# Patient Record
Sex: MALE | Race: WHITE | NOT HISPANIC OR LATINO | Employment: OTHER | URBAN - METROPOLITAN AREA
[De-identification: names, ages, dates, MRNs, and addresses within clinical notes are randomized per-mention and may not be internally consistent; named-entity substitution may affect disease eponyms.]

---

## 2018-06-19 ENCOUNTER — OFFICE VISIT (OUTPATIENT)
Dept: INTERNAL MEDICINE CLINIC | Facility: CLINIC | Age: 68
End: 2018-06-19
Payer: MEDICARE

## 2018-06-19 VITALS
DIASTOLIC BLOOD PRESSURE: 74 MMHG | HEART RATE: 108 BPM | TEMPERATURE: 99.6 F | RESPIRATION RATE: 18 BRPM | HEIGHT: 68 IN | WEIGHT: 267.2 LBS | BODY MASS INDEX: 40.5 KG/M2 | OXYGEN SATURATION: 96 % | SYSTOLIC BLOOD PRESSURE: 116 MMHG

## 2018-06-19 DIAGNOSIS — Z23 NEED FOR PNEUMOCOCCAL VACCINATION: ICD-10-CM

## 2018-06-19 DIAGNOSIS — I10 HYPERTENSION, UNSPECIFIED TYPE: ICD-10-CM

## 2018-06-19 DIAGNOSIS — R06.00 DOE (DYSPNEA ON EXERTION): ICD-10-CM

## 2018-06-19 DIAGNOSIS — Z12.5 SCREENING FOR PROSTATE CANCER: ICD-10-CM

## 2018-06-19 DIAGNOSIS — Z13.29 SCREENING FOR HYPOTHYROIDISM: ICD-10-CM

## 2018-06-19 DIAGNOSIS — E55.9 VITAMIN D DEFICIENCY: ICD-10-CM

## 2018-06-19 DIAGNOSIS — E78.5 HYPERLIPIDEMIA, UNSPECIFIED HYPERLIPIDEMIA TYPE: ICD-10-CM

## 2018-06-19 DIAGNOSIS — F51.01 PRIMARY INSOMNIA: Primary | ICD-10-CM

## 2018-06-19 PROCEDURE — G0009 ADMIN PNEUMOCOCCAL VACCINE: HCPCS

## 2018-06-19 PROCEDURE — 90670 PCV13 VACCINE IM: CPT

## 2018-06-19 PROCEDURE — 99204 OFFICE O/P NEW MOD 45 MIN: CPT | Performed by: PHYSICIAN ASSISTANT

## 2018-06-19 RX ORDER — ZOLPIDEM TARTRATE 10 MG/1
10 TABLET ORAL
Qty: 30 TABLET | Refills: 2 | Status: SHIPPED | OUTPATIENT
Start: 2018-06-19 | End: 2020-01-30

## 2018-06-19 RX ORDER — LISINOPRIL AND HYDROCHLOROTHIAZIDE 25; 20 MG/1; MG/1
TABLET ORAL
COMMUNITY
Start: 2018-04-05 | End: 2018-06-19 | Stop reason: SDUPTHER

## 2018-06-19 RX ORDER — PRAVASTATIN SODIUM 40 MG
TABLET ORAL
COMMUNITY
Start: 2018-04-26 | End: 2018-06-19 | Stop reason: SDUPTHER

## 2018-06-19 RX ORDER — DIPHENHYDRAMINE HCL 25 MG
25 TABLET ORAL
COMMUNITY
End: 2022-01-18

## 2018-06-19 RX ORDER — ZOLPIDEM TARTRATE 5 MG/1
5 TABLET ORAL
COMMUNITY
End: 2018-06-19 | Stop reason: SDUPTHER

## 2018-06-19 RX ORDER — LISINOPRIL AND HYDROCHLOROTHIAZIDE 25; 20 MG/1; MG/1
1 TABLET ORAL DAILY
Qty: 90 TABLET | Refills: 3 | Status: SHIPPED | OUTPATIENT
Start: 2018-06-19 | End: 2019-04-18 | Stop reason: SDUPTHER

## 2018-06-19 RX ORDER — PRAVASTATIN SODIUM 40 MG
40 TABLET ORAL DAILY
Qty: 90 TABLET | Refills: 3 | Status: SHIPPED | OUTPATIENT
Start: 2018-06-19 | End: 2018-07-31 | Stop reason: SDUPTHER

## 2018-06-19 NOTE — PROGRESS NOTES
Assessment/Plan:    Primary insomnia  Will increase ambien to 10mg       Diagnoses and all orders for this visit:    Primary insomnia  -     zolpidem (AMBIEN) 10 mg tablet; Take 1 tablet (10 mg total) by mouth daily at bedtime as needed for sleep    Screening for prostate cancer  -     PSA, total and free    Vitamin D deficiency  -     Vitamin D 25 hydroxy    Screening for hypothyroidism  -     TSH, 3rd generation with T4 reflex    Hypertension, unspecified type  -     CBC and differential  -     Comprehensive metabolic panel  -     Lipid Panel with Direct LDL reflex  -     lisinopril-hydrochlorothiazide (PRINZIDE,ZESTORETIC) 20-25 MG per tablet; Take 1 tablet by mouth daily  -     Echo stress test w contrast if indicated; Future    Hyperlipidemia, unspecified hyperlipidemia type  -     CBC and differential  -     Comprehensive metabolic panel  -     Lipid Panel with Direct LDL reflex  -     pravastatin (PRAVACHOL) 40 mg tablet; Take 1 tablet (40 mg total) by mouth daily  -     Echo stress test w contrast if indicated; Future    HAMPTON (dyspnea on exertion)  -     Echo stress test w contrast if indicated; Future    Need for pneumococcal vaccination  -     PNEUMOCOCCAL CONJUGATE VACCINE 13-VALENT GREATER THAN 6 MONTHS    Other orders  -     Discontinue: pravastatin (PRAVACHOL) 40 mg tablet;   -     aspirin 81 MG tablet; Take 81 mg by mouth daily  -     Omega-3 Fatty Acids (FISH OIL PO); Take by mouth  -     Discontinue: zolpidem (AMBIEN) 5 mg tablet; Take 5 mg by mouth daily at bedtime as needed for sleep  -     diphenhydrAMINE (BENADRYL) 25 mg tablet; Take 25 mg by mouth daily at bedtime as needed for itching  -     Discontinue: lisinopril-hydrochlorothiazide (PRINZIDE,ZESTORETIC) 20-25 MG per tablet;           Subjective:      Patient ID: Sal Dolan is a 69 y o  male  Pt presents to [facility] care  PMHx significant for hyperlipidemia, insomnia, htn, and arthritis  He has had multiple orthopedic surgeries  NKDA  Medications are as noted in his chart  He is a former smoker and quit 40 years ago  Alcohol use is social  He is retired,  and has 4 children  Both parents are , his mother had lupus and RA and his father had heart disease  He is due for labs  Colonoscopy up to date  He complains of ongoing insomnia  The Lory Adams used to work better for him but now he gets about 2 hours worth of sleep  He tolerates this well  He takes it only prn  The following portions of the patient's history were reviewed and updated as appropriate:   He  has a past medical history of Arthritis; Hyperlipidemia; and Hypertension  He   Patient Active Problem List    Diagnosis Date Noted    Primary insomnia 2018    Hyperlipidemia 2018    Hypertension 2018     He  has a past surgical history that includes Knee surgery; Hand surgery (Bilateral); Wrist surgery; Elbow surgery; and Colonoscopy  His family history includes Diabetes in his sister; Heart disease in his father and paternal grandmother; Kidney disease in his sister; Lupus in his mother; Rheum arthritis in his mother  He  reports that he has quit smoking  He has never used smokeless tobacco  He reports that he drinks alcohol  He reports that he does not use drugs  Current Outpatient Prescriptions   Medication Sig Dispense Refill    aspirin 81 MG tablet Take 81 mg by mouth daily      diphenhydrAMINE (BENADRYL) 25 mg tablet Take 25 mg by mouth daily at bedtime as needed for itching      lisinopril-hydrochlorothiazide (PRINZIDE,ZESTORETIC) 20-25 MG per tablet Take 1 tablet by mouth daily 90 tablet 3    Omega-3 Fatty Acids (FISH OIL PO) Take by mouth      pravastatin (PRAVACHOL) 40 mg tablet Take 1 tablet (40 mg total) by mouth daily 90 tablet 3    zolpidem (AMBIEN) 10 mg tablet Take 1 tablet (10 mg total) by mouth daily at bedtime as needed for sleep 30 tablet 2     No current facility-administered medications for this visit        No current outpatient prescriptions on file prior to visit  No current facility-administered medications on file prior to visit  He is allergic to other       Review of Systems   Constitutional: Negative for chills and fever  HENT: Negative for congestion, ear pain, hearing loss, postnasal drip, rhinorrhea, sinus pain, sinus pressure, sore throat and trouble swallowing  Eyes: Negative for pain and visual disturbance  Respiratory: Negative for cough, chest tightness, shortness of breath and wheezing  Cardiovascular: Negative  Negative for chest pain, palpitations and leg swelling  Gastrointestinal: Negative for abdominal pain, blood in stool, constipation, diarrhea, nausea and vomiting  Endocrine: Negative for cold intolerance, heat intolerance, polydipsia, polyphagia and polyuria  Genitourinary: Negative for difficulty urinating, dysuria, flank pain and urgency  Musculoskeletal: Negative for arthralgias, back pain, gait problem and myalgias  Skin: Negative for rash  Allergic/Immunologic: Negative  Neurological: Negative for dizziness, weakness, light-headedness and headaches  Hematological: Negative  Psychiatric/Behavioral: Positive for sleep disturbance  Negative for behavioral problems and dysphoric mood  The patient is not nervous/anxious  Objective:      /74 (BP Location: Left arm, Patient Position: Sitting, Cuff Size: Large)   Pulse (!) 108   Temp 99 6 °F (37 6 °C)   Resp 18   Ht 5' 8" (1 727 m)   Wt 121 kg (267 lb 3 2 oz)   SpO2 96%   BMI 40 63 kg/m²          Physical Exam   Constitutional: He is oriented to person, place, and time  He appears well-developed and well-nourished  No distress  HENT:   Head: Normocephalic and atraumatic  Right Ear: External ear normal    Left Ear: External ear normal    Nose: Nose normal    Mouth/Throat: Oropharynx is clear and moist  No oropharyngeal exudate     Eyes: Conjunctivae and EOM are normal  Pupils are equal, round, and reactive to light  Right eye exhibits no discharge  Left eye exhibits no discharge  No scleral icterus  Neck: Normal range of motion  Neck supple  No thyromegaly present  Cardiovascular: Normal rate, regular rhythm and normal heart sounds  Exam reveals no gallop and no friction rub  No murmur heard  Pulmonary/Chest: Effort normal and breath sounds normal  No respiratory distress  He has no wheezes  He has no rales  Abdominal: Soft  Bowel sounds are normal  He exhibits no distension  There is no tenderness  Musculoskeletal: Normal range of motion  He exhibits no edema, tenderness or deformity  Neurological: He is alert and oriented to person, place, and time  No cranial nerve deficit  Skin: Skin is warm and dry  He is not diaphoretic  Psychiatric: He has a normal mood and affect   His behavior is normal  Judgment and thought content normal

## 2018-06-25 ENCOUNTER — APPOINTMENT (OUTPATIENT)
Dept: LAB | Facility: CLINIC | Age: 68
End: 2018-06-25
Payer: MEDICARE

## 2018-06-25 LAB
25(OH)D3 SERPL-MCNC: 14.8 NG/ML (ref 30–100)
ALBUMIN SERPL BCP-MCNC: 3.6 G/DL (ref 3.5–5)
ALP SERPL-CCNC: 50 U/L (ref 46–116)
ALT SERPL W P-5'-P-CCNC: 45 U/L (ref 12–78)
ANION GAP SERPL CALCULATED.3IONS-SCNC: 6 MMOL/L (ref 4–13)
AST SERPL W P-5'-P-CCNC: 24 U/L (ref 5–45)
BASOPHILS # BLD AUTO: 0.05 THOUSANDS/ΜL (ref 0–0.1)
BASOPHILS NFR BLD AUTO: 1 % (ref 0–1)
BILIRUB SERPL-MCNC: 0.74 MG/DL (ref 0.2–1)
BUN SERPL-MCNC: 16 MG/DL (ref 5–25)
CALCIUM SERPL-MCNC: 8.7 MG/DL (ref 8.3–10.1)
CHLORIDE SERPL-SCNC: 101 MMOL/L (ref 100–108)
CHOLEST SERPL-MCNC: 133 MG/DL (ref 50–200)
CO2 SERPL-SCNC: 28 MMOL/L (ref 21–32)
CREAT SERPL-MCNC: 1.03 MG/DL (ref 0.6–1.3)
EOSINOPHIL # BLD AUTO: 0.37 THOUSAND/ΜL (ref 0–0.61)
EOSINOPHIL NFR BLD AUTO: 4 % (ref 0–6)
ERYTHROCYTE [DISTWIDTH] IN BLOOD BY AUTOMATED COUNT: 13.3 % (ref 11.6–15.1)
GFR SERPL CREATININE-BSD FRML MDRD: 74 ML/MIN/1.73SQ M
GLUCOSE P FAST SERPL-MCNC: 117 MG/DL (ref 65–99)
HCT VFR BLD AUTO: 45.8 % (ref 36.5–49.3)
HDLC SERPL-MCNC: 45 MG/DL (ref 40–60)
HGB BLD-MCNC: 14.8 G/DL (ref 12–17)
IMM GRANULOCYTES # BLD AUTO: 0.05 THOUSAND/UL (ref 0–0.2)
IMM GRANULOCYTES NFR BLD AUTO: 1 % (ref 0–2)
LDLC SERPL CALC-MCNC: 67 MG/DL (ref 0–100)
LYMPHOCYTES # BLD AUTO: 2.19 THOUSANDS/ΜL (ref 0.6–4.47)
LYMPHOCYTES NFR BLD AUTO: 26 % (ref 14–44)
MCH RBC QN AUTO: 30.4 PG (ref 26.8–34.3)
MCHC RBC AUTO-ENTMCNC: 32.3 G/DL (ref 31.4–37.4)
MCV RBC AUTO: 94 FL (ref 82–98)
MONOCYTES # BLD AUTO: 0.72 THOUSAND/ΜL (ref 0.17–1.22)
MONOCYTES NFR BLD AUTO: 9 % (ref 4–12)
NEUTROPHILS # BLD AUTO: 5.05 THOUSANDS/ΜL (ref 1.85–7.62)
NEUTS SEG NFR BLD AUTO: 59 % (ref 43–75)
NRBC BLD AUTO-RTO: 0 /100 WBCS
PLATELET # BLD AUTO: 219 THOUSANDS/UL (ref 149–390)
PMV BLD AUTO: 12.3 FL (ref 8.9–12.7)
POTASSIUM SERPL-SCNC: 4 MMOL/L (ref 3.5–5.3)
PROT SERPL-MCNC: 7.4 G/DL (ref 6.4–8.2)
RBC # BLD AUTO: 4.87 MILLION/UL (ref 3.88–5.62)
SODIUM SERPL-SCNC: 135 MMOL/L (ref 136–145)
TRIGL SERPL-MCNC: 105 MG/DL
TSH SERPL DL<=0.05 MIU/L-ACNC: 0.71 UIU/ML (ref 0.36–3.74)
WBC # BLD AUTO: 8.43 THOUSAND/UL (ref 4.31–10.16)

## 2018-06-25 PROCEDURE — G0103 PSA SCREENING: HCPCS

## 2018-06-25 PROCEDURE — 36415 COLL VENOUS BLD VENIPUNCTURE: CPT | Performed by: PHYSICIAN ASSISTANT

## 2018-06-25 PROCEDURE — 85025 COMPLETE CBC W/AUTO DIFF WBC: CPT | Performed by: PHYSICIAN ASSISTANT

## 2018-06-25 PROCEDURE — 84443 ASSAY THYROID STIM HORMONE: CPT | Performed by: PHYSICIAN ASSISTANT

## 2018-06-25 PROCEDURE — 82306 VITAMIN D 25 HYDROXY: CPT | Performed by: PHYSICIAN ASSISTANT

## 2018-06-25 PROCEDURE — 80061 LIPID PANEL: CPT | Performed by: PHYSICIAN ASSISTANT

## 2018-06-25 PROCEDURE — 80053 COMPREHEN METABOLIC PANEL: CPT | Performed by: PHYSICIAN ASSISTANT

## 2018-06-26 DIAGNOSIS — E55.9 VITAMIN D DEFICIENCY: Primary | ICD-10-CM

## 2018-06-26 RX ORDER — ERGOCALCIFEROL 1.25 MG/1
50000 CAPSULE ORAL WEEKLY
Qty: 4 CAPSULE | Refills: 0 | Status: SHIPPED | OUTPATIENT
Start: 2018-06-26 | End: 2018-12-18

## 2018-06-27 ENCOUNTER — APPOINTMENT (OUTPATIENT)
Dept: LAB | Facility: CLINIC | Age: 68
End: 2018-06-27
Payer: MEDICARE

## 2018-06-27 DIAGNOSIS — R73.01 ELEVATED FASTING BLOOD SUGAR: Primary | ICD-10-CM

## 2018-06-27 LAB
EST. AVERAGE GLUCOSE BLD GHB EST-MCNC: 143 MG/DL
HBA1C MFR BLD: 6.6 % (ref 4.2–6.3)
PSA FREE MFR SERPL: 27.5 %
PSA FREE SERPL-MCNC: 0.11 NG/ML
PSA SERPL-MCNC: 0.4 NG/ML (ref 0–4)

## 2018-06-27 PROCEDURE — 83036 HEMOGLOBIN GLYCOSYLATED A1C: CPT

## 2018-06-27 PROCEDURE — 36415 COLL VENOUS BLD VENIPUNCTURE: CPT

## 2018-07-31 DIAGNOSIS — E78.5 HYPERLIPIDEMIA, UNSPECIFIED HYPERLIPIDEMIA TYPE: ICD-10-CM

## 2018-07-31 RX ORDER — PRAVASTATIN SODIUM 40 MG
40 TABLET ORAL DAILY
Qty: 90 TABLET | Refills: 3 | Status: SHIPPED | OUTPATIENT
Start: 2018-07-31 | End: 2019-08-19 | Stop reason: SDUPTHER

## 2018-08-10 ENCOUNTER — HOSPITAL ENCOUNTER (OUTPATIENT)
Dept: NON INVASIVE DIAGNOSTICS | Facility: CLINIC | Age: 68
Discharge: HOME | End: 2018-08-10
Payer: MEDICARE

## 2018-08-10 DIAGNOSIS — R94.39 ABNORMAL CARDIOVASCULAR STRESS TEST: ICD-10-CM

## 2018-08-10 DIAGNOSIS — E78.5 HYPERLIPIDEMIA, UNSPECIFIED HYPERLIPIDEMIA TYPE: ICD-10-CM

## 2018-08-10 DIAGNOSIS — I10 HYPERTENSION, UNSPECIFIED TYPE: ICD-10-CM

## 2018-08-10 DIAGNOSIS — I10 ESSENTIAL HYPERTENSION: ICD-10-CM

## 2018-08-10 DIAGNOSIS — I10 BENIGN HYPERTENSION: Primary | ICD-10-CM

## 2018-08-10 DIAGNOSIS — R94.39 ABNORMAL STRESS ECHO: Primary | ICD-10-CM

## 2018-08-10 DIAGNOSIS — R06.00 DOE (DYSPNEA ON EXERTION): ICD-10-CM

## 2018-08-10 PROCEDURE — 93351 STRESS TTE COMPLETE: CPT | Performed by: INTERNAL MEDICINE

## 2018-08-10 PROCEDURE — 93350 STRESS TTE ONLY: CPT

## 2018-08-10 NOTE — PROGRESS NOTES
Patient scheduled for heart catheterization with Chasity Foster due to abnormal stress test    Labs ordered

## 2018-08-13 ENCOUNTER — APPOINTMENT (OUTPATIENT)
Dept: LAB | Facility: CLINIC | Age: 68
End: 2018-08-13
Payer: MEDICARE

## 2018-08-13 LAB
ANION GAP SERPL CALCULATED.3IONS-SCNC: 8 MMOL/L (ref 4–13)
BASOPHILS # BLD AUTO: 0.04 THOUSANDS/ΜL (ref 0–0.1)
BASOPHILS NFR BLD AUTO: 1 % (ref 0–1)
BUN SERPL-MCNC: 14 MG/DL (ref 5–25)
CALCIUM SERPL-MCNC: 8.5 MG/DL (ref 8.3–10.1)
CHLORIDE SERPL-SCNC: 104 MMOL/L (ref 100–108)
CO2 SERPL-SCNC: 27 MMOL/L (ref 21–32)
CREAT SERPL-MCNC: 1.07 MG/DL (ref 0.6–1.3)
EOSINOPHIL # BLD AUTO: 0.23 THOUSAND/ΜL (ref 0–0.61)
EOSINOPHIL NFR BLD AUTO: 3 % (ref 0–6)
ERYTHROCYTE [DISTWIDTH] IN BLOOD BY AUTOMATED COUNT: 13.5 % (ref 11.6–15.1)
GFR SERPL CREATININE-BSD FRML MDRD: 70 ML/MIN/1.73SQ M
GLUCOSE SERPL-MCNC: 98 MG/DL (ref 65–140)
HCT VFR BLD AUTO: 44.1 % (ref 36.5–49.3)
HGB BLD-MCNC: 14.3 G/DL (ref 12–17)
IMM GRANULOCYTES # BLD AUTO: 0.04 THOUSAND/UL (ref 0–0.2)
IMM GRANULOCYTES NFR BLD AUTO: 1 % (ref 0–2)
LYMPHOCYTES # BLD AUTO: 2.91 THOUSANDS/ΜL (ref 0.6–4.47)
LYMPHOCYTES NFR BLD AUTO: 34 % (ref 14–44)
MCH RBC QN AUTO: 30.6 PG (ref 26.8–34.3)
MCHC RBC AUTO-ENTMCNC: 32.4 G/DL (ref 31.4–37.4)
MCV RBC AUTO: 94 FL (ref 82–98)
MONOCYTES # BLD AUTO: 0.97 THOUSAND/ΜL (ref 0.17–1.22)
MONOCYTES NFR BLD AUTO: 11 % (ref 4–12)
NEUTROPHILS # BLD AUTO: 4.49 THOUSANDS/ΜL (ref 1.85–7.62)
NEUTS SEG NFR BLD AUTO: 50 % (ref 43–75)
NRBC BLD AUTO-RTO: 0 /100 WBCS
PLATELET # BLD AUTO: 224 THOUSANDS/UL (ref 149–390)
PMV BLD AUTO: 13.1 FL (ref 8.9–12.7)
POTASSIUM SERPL-SCNC: 4.1 MMOL/L (ref 3.5–5.3)
RBC # BLD AUTO: 4.67 MILLION/UL (ref 3.88–5.62)
SODIUM SERPL-SCNC: 139 MMOL/L (ref 136–145)
WBC # BLD AUTO: 8.68 THOUSAND/UL (ref 4.31–10.16)

## 2018-08-13 PROCEDURE — 85025 COMPLETE CBC W/AUTO DIFF WBC: CPT

## 2018-08-13 PROCEDURE — 36415 COLL VENOUS BLD VENIPUNCTURE: CPT

## 2018-08-13 PROCEDURE — 80048 BASIC METABOLIC PNL TOTAL CA: CPT

## 2018-08-14 ENCOUNTER — TELEPHONE (OUTPATIENT)
Dept: INTERNAL MEDICINE CLINIC | Facility: CLINIC | Age: 68
End: 2018-08-14

## 2018-08-14 NOTE — TELEPHONE ENCOUNTER
I cannot addend the note  Those results were not available at the time of the encounter anyway  Can we write a letter stating it was positive? Or can his cardiologist provide the info needed?

## 2018-08-14 NOTE — TELEPHONE ENCOUNTER
Talked to BODØ again   She will talk to her PA tomorrow and see if they can see him ASAP to get H&P

## 2018-08-14 NOTE — TELEPHONE ENCOUNTER
Called Esperanza Donahue from cardio again   She states the cardiac cath dept needs addendum stating the stress test is positive and cardiac cath is needed for further eval

## 2018-08-14 NOTE — TELEPHONE ENCOUNTER
Tonja Ya, thank you  Not trying to be difficult  I attempted to addend the note and it would not allow me to since it is from [facility]

## 2018-08-15 ENCOUNTER — OFFICE VISIT (OUTPATIENT)
Dept: CARDIOLOGY CLINIC | Facility: CLINIC | Age: 68
End: 2018-08-15
Payer: MEDICARE

## 2018-08-15 VITALS
BODY MASS INDEX: 35 KG/M2 | WEIGHT: 250 LBS | HEART RATE: 90 BPM | HEIGHT: 71 IN | SYSTOLIC BLOOD PRESSURE: 110 MMHG | DIASTOLIC BLOOD PRESSURE: 82 MMHG

## 2018-08-15 DIAGNOSIS — E78.2 MIXED HYPERLIPIDEMIA: ICD-10-CM

## 2018-08-15 DIAGNOSIS — R06.00 DYSPNEA ON EXERTION: Primary | ICD-10-CM

## 2018-08-15 DIAGNOSIS — R94.39 ABNORMAL STRESS ECHO: ICD-10-CM

## 2018-08-15 DIAGNOSIS — I10 ESSENTIAL HYPERTENSION: ICD-10-CM

## 2018-08-15 PROBLEM — R06.09 DYSPNEA ON EXERTION: Status: ACTIVE | Noted: 2018-08-15

## 2018-08-15 PROCEDURE — 99205 OFFICE O/P NEW HI 60 MIN: CPT | Performed by: PHYSICIAN ASSISTANT

## 2018-08-15 NOTE — PROGRESS NOTES
[facility]   Outpatient Note  Sal Dolan  [id number]  [id number]  [facility], [facility] [address] [id number] [phone]    Subjective:   Sal Dolan is a 69 y o  male    The patient is referred to the office as a new patient for evaluation and treatment with regard to SOB  He recently had an abnormal stress echo that showed WMA that was indicative of ischemia in the LAD distribution  The EKG portion of the Stress test was non-diagnostic, although there was non-specific ST changes  The patient had been feeling SOB with exertion walking up a hill or pulling weeds, for the last two years more so in the last few months  He has no chest pain or pressure per se, and felt that he should be checked out with a stress test given his family history and risk factors  His PCP ordered a stress test upon his request     He continues to have exertional dyspnea  He has a history of HTN, HLD, and obesity  He has not smoked for over 40 years  All of his brothers and father have had Coronary events and stenting             PMH/PSH  Reviewd  History   Smoking Status    Former Smoker   Smokeless Tobacco    Never Used   ,   History   Alcohol Use    Yes     Comment: SOCIAL   ,   History   Drug Use No     Family History   Problem Relation Age of Onset    Rheum arthritis Mother     Lupus Mother     Heart disease Father     Diabetes Sister     Kidney disease Sister     Heart disease Paternal Grandmother          Current Outpatient Prescriptions:     aspirin 81 MG tablet, Take 81 mg by mouth daily, Disp: , Rfl:     diphenhydrAMINE (BENADRYL) 25 mg tablet, Take 25 mg by mouth daily at bedtime as needed for itching, Disp: , Rfl:     ergocalciferol (VITAMIN D2) 50,000 units, Take 1 capsule (50,000 Units total) by mouth once a week, Disp: 4 capsule, Rfl: 0    lisinopril-hydrochlorothiazide (PRINZIDE,ZESTORETIC) 20-25 MG per tablet, Take 1 tablet by mouth daily, Disp: 90 tablet, Rfl: 3    Omega-3 Fatty Acids (FISH OIL PO), Take by mouth, Disp: , Rfl:     pravastatin (PRAVACHOL) 40 mg tablet, Take 1 tablet (40 mg total) by mouth daily, Disp: 90 tablet, Rfl: 3    zolpidem (AMBIEN) 10 mg tablet, Take 1 tablet (10 mg total) by mouth daily at bedtime as needed for sleep, Disp: 30 tablet, Rfl: 2  Allergies   Allergen Reactions    Other      Summertime grass, weeds       ROS    Objective:   /82   Pulse 90   Ht 5' 11" (1 803 m)   Wt 113 kg (250 lb)   BMI 34 87 kg/m²   Physical Exam    Lab Review:   Unremarkable      Recent Cardiac Testing:   See comments above     ECG Review:   SR RBBB NSST changes    Assessment:     Problem List Items Addressed This Visit     Hyperlipidemia    Hypertension    Relevant Medications    metoprolol tartrate (LOPRESSOR) 25 mg tablet    Dyspnea on exertion - Primary    Relevant Medications    metoprolol tartrate (LOPRESSOR) 25 mg tablet    Abnormal stress echo    Relevant Medications    metoprolol tartrate (LOPRESSOR) 25 mg tablet        Plan:   The patient will be planned for Cardiac Cath ( VIS possible)  He is given instructions and is agreeable  Arrangements will be made for first available time [facility]  Metoprolol will be added  Return in one month

## 2018-08-15 NOTE — PATIENT INSTRUCTIONS
Heart Catheterization   AMBULATORY CARE:   What you need to know about heart catheterization:  A heart catheterization is a procedure to look at your heart and its blood vessels  Healthcare providers can measure oxygen levels and pressures in your heart  They can also fix problems with your valves, blood vessels, or the walls of your heart during the procedure  You may need this procedure if you have chest pain, heart disease, or your heart is not working properly  How to prepare for heart catheterization:   · You may need blood tests, a chest x-ray, ultrasound, or electrocardiogram (ECG) before your surgery  Talk to your healthcare provider about these or other tests you may need  You will need someone to drive you home and stay with you after your procedure  · Your healthcare provider will talk to you about how to prepare for your procedure  He may tell you not to eat or drink anything after midnight on the day of your procedure  He will tell you what medicines to take or not take on the day of your procedure  You may need to stop taking blood thinners several days before your procedure  You can continue taking aspirin  You may be given an antibiotic through your IV to help prevent a bacterial infection  · Contrast liquid may be used during your procedure  Tell a healthcare provider if you have an allergy to any contrast materials or other medicines  What will happen during heart catheterization:   · You may be given general anesthesia to keep you asleep and free from pain during your procedure  You may instead be given IV sedation to make you feel calm and relaxed during the procedure  You may also be given local anesthesia to numb the surgery area  With local anesthesia, you may still feel pressure or pushing during your procedure, but you should not feel any pain  · If you are given IV sedation or local anesthesia, you will be awake enough to follow directions   You may be asked to cough, deep breathe, or hold your breath  This will help your healthcare provider see your heart structures more clearly on x-ray  · Your healthcare provider will insert a catheter and wire into a blood vessel in your neck, arm, wrist, or groin  He will move a wire through the catheter and up into your heart  Your healthcare provider may inject contrast liquid so he can see your heart tissue, blood vessels, or valves more clearly on the x-ray  He may measure oxygen and pressures in different parts of your heart and blood vessels  He may also fix blockages in your blood vessels or open up valves in your heart  He may repair or replace one of your heart valves  A small piece of your heart tissue may be taken to stop irregular heartbeats  · Your healthcare provider will remove the catheter  He may use clamps, stitches, or other devices to close the wound  Pressure will be applied to the wound for several minutes to stop any bleeding  A pressure bandage or other pressure device may be placed over the wound to help prevent more bleeding  What will happen after heart catheterization:   · You will be attached to a heart monitor until you are fully awake  A heart monitor is an EKG that stays on continuously to record your heart's electrical activity  Healthcare providers will monitor your vital signs and pulses in your arm or leg  They will frequently check your pressure bandage for bleeding or swelling  · You will need to lie flat with your leg or arm straight for 2 to 4 hours  Do not  get out of bed until your healthcare provider says it is okay  Arm or leg movements can cause serious bleeding  After you are monitored for several hours, you may go home or may need to stay in the hospital overnight  Risks of heart catheterization:  You may bleed more than usual or get an infection  You may have bruising or pain where the catheter was   You may need surgery to repair damages from the catheter to your heart or blood vessels or stop bleeding  You may get a blood clot in your arm or leg  You could have a heart attack or stroke during or after the procedure  Your heart could have irregular heart beats  The contrast liquid may cause kidney damage or an allergic reaction  Call 911 for any of the following:   · You have any of the following signs of a heart attack:      ¨ Squeezing, pressure, or pain in your chest that lasts longer than 5 minutes or returns    ¨ Discomfort or pain in your back, neck, jaw, stomach, or arm     ¨ Trouble breathing    ¨ Nausea or vomiting    ¨ Lightheadedness or a sudden cold sweat, especially with chest pain or trouble breathing    · You have any of the following signs of a stroke:      ¨ Numbness or drooping on one side of your face     ¨ Weakness in an arm or leg    ¨ Confusion or difficulty speaking    ¨ Dizziness, a severe headache, or vision loss    · You feel lightheaded, short of breath, and have chest pain  · You cough up blood  · You have trouble breathing  · You cannot stop bleeding from the wound even after you hold firm pressure for 10 minutes  Seek care immediately if:   · Blood soaks through your bandage  · Your stitches come apart  · Your arm or leg feels numb, cool, or looks pale  · Your wound gets swollen quickly  Contact your healthcare provider if:   · You have a fever or chills  · Your wound is red, swollen, or draining pus  · Your wound looks more bruised or there is new bruising on the side of your leg or arm  · You have nausea or are vomiting  · Your skin is itchy, swollen, or you have a rash  · You have questions or concerns about your condition or care  Medicines: You may need any of the following:  · Blood thinners  help prevent blood clots  You may need to take blood thinners after your procedure if your healthcare provider finds problems in your heart or blood vessels  You may also need them if he replaces one of your heart valves  Examples of blood thinners include heparin and warfarin  Clots can cause strokes, heart attacks, and death  The following are general safety guidelines to follow while you are taking a blood thinner:    ¨ Watch for bleeding and bruising while you take blood thinners  Watch for bleeding from your gums or nose  Watch for blood in your urine and bowel movements  Use a soft washcloth on your skin, and a soft toothbrush to brush your teeth  This can keep your skin and gums from bleeding  If you shave, use an electric shaver  Do not play contact sports  ¨ Tell your dentist and other healthcare providers that you take anticoagulants  Wear a bracelet or necklace that says you take this medicine  ¨ Do not start or stop any medicines unless your healthcare provider tells you to  Many medicines cannot be used with blood thinners  ¨ Tell your healthcare provider right away if you forget to take the medicine, or if you take too much  ¨ Warfarin  is a blood thinner that you may need to take  The following are things you should be aware of if you take warfarin  § Foods and medicines can affect the amount of warfarin in your blood  Do not make major changes to your diet while you take warfarin  Warfarin works best when you eat about the same amount of vitamin K every day  Vitamin K is found in green leafy vegetables and certain other foods  Ask for more information about what to eat when you are taking warfarin  § You will need to see your healthcare provider for follow-up visits when you are on warfarin  You will need regular blood tests  These tests are used to decide how much medicine you need  · Acetaminophen  may be given to decrease your pain and fever  This medicine is available without a doctor's order  Ask how much medicine is safe to take, and how often to take it  Acetaminophen can cause liver damage if not taken correctly  · Take your medicine as directed    Contact your healthcare provider if you think your medicine is not helping or if you have side effects  Tell him or her if you are allergic to any medicine  Keep a list of the medicines, vitamins, and herbs you take  Include the amounts, and when and why you take them  Bring the list or the pill bottles to follow-up visits  Carry your medicine list with you in case of an emergency  Bathing: You may be able to shower the day after your procedure  Remove your pressure bandage before you shower  Do not take baths or go in hot tubs or pools  Carefully wash the wound with soap and water  Pat the area dry  Care for your wound as directed:  Change your bandage when it gets wet or dirty  A small band-aid can be placed on your wound after you remove the pressure bandage  Monitor your wound everyday for signs of infection such as redness, swelling, or pus  Mild bruising is normal and expected  Do not put powders, lotions, or creams on your wound  Self-care:   · Apply firm , steady pressure if bleeding occurs  A small amount of bleeding from your wound is possible  Apply pressure with a clean gauze or towel for 5 to 10 minutes  Call 911 if bleeding becomes heavy or does not stop  · Do not lift anything heavier than 5 pounds  until directed by your healthcare provider  Heavy lifting can put stress on your wound and cause bleeding  Do not push or pull with the arm that was used for the procedure  · Do not do vigorous activity for at least 48 hours  Vigorous activity may cause bleeding from your wound  Rest and do quiet activities  Short walks to the bathroom and around the house are okay  Ask your healthcare provider when you can return to your normal activities  · Limit stair climbing  to prevent bleeding from your wound  Plan activities on one floor and use stairs 2 times a day or less  · Drink liquids to flush the contrast from your body  Also drink liquids to prevent blood clots   Ask how much liquid to drink each day and which liquids are best for you  · Restart your blood thinners as directed  Your healthcare provider may tell you to start taking blood thinners after your procedure or he may have you wait a few days  Follow up with your healthcare provider as directed:  Write down your questions so you remember to ask them during your visits  © 2017 [employer] Information is for End User's use only and may not be sold, redistributed or otherwise used for commercial purposes  All illustrations and images included in CareNotes® are the copyrighted property of [employer]  or Ahs[employer]  The above information is an  only  It is not intended as medical advice for individual conditions or treatments  Talk to your doctor, nurse or pharmacist before following any medical regimen to see if it is safe and effective for you

## 2018-08-16 ENCOUNTER — TELEPHONE (OUTPATIENT)
Dept: INPATIENT UNIT | Facility: HOSPITAL | Age: 68
End: 2018-08-16

## 2018-08-16 RX ORDER — SODIUM CHLORIDE 9 MG/ML
100 INJECTION, SOLUTION INTRAVENOUS CONTINUOUS
Status: CANCELLED | OUTPATIENT
Start: 2018-08-16

## 2018-08-16 RX ORDER — ASPIRIN 81 MG/1
324 TABLET, CHEWABLE ORAL ONCE
Status: CANCELLED | OUTPATIENT
Start: 2018-08-16 | End: 2018-08-16

## 2018-08-17 ENCOUNTER — HOSPITAL ENCOUNTER (OUTPATIENT)
Dept: NON INVASIVE DIAGNOSTICS | Facility: HOSPITAL | Age: 68
Discharge: HOME | End: 2018-08-17
Attending: INTERNAL MEDICINE | Admitting: INTERNAL MEDICINE
Payer: MEDICARE

## 2018-08-17 VITALS
OXYGEN SATURATION: 96 % | RESPIRATION RATE: 18 BRPM | TEMPERATURE: 98 F | SYSTOLIC BLOOD PRESSURE: 91 MMHG | HEART RATE: 82 BPM | DIASTOLIC BLOOD PRESSURE: 55 MMHG

## 2018-08-17 DIAGNOSIS — R06.00 DOE (DYSPNEA ON EXERTION): ICD-10-CM

## 2018-08-17 DIAGNOSIS — I10 ESSENTIAL HYPERTENSION: ICD-10-CM

## 2018-08-17 DIAGNOSIS — R94.39 ABNORMAL STRESS ECHO: ICD-10-CM

## 2018-08-17 DIAGNOSIS — E78.5 HYPERLIPIDEMIA, UNSPECIFIED HYPERLIPIDEMIA TYPE: ICD-10-CM

## 2018-08-17 LAB
ANION GAP SERPL CALCULATED.3IONS-SCNC: 7 MMOL/L (ref 4–13)
BASOPHILS # BLD AUTO: 0.05 THOUSANDS/ΜL (ref 0–0.1)
BASOPHILS NFR BLD AUTO: 1 % (ref 0–1)
BUN SERPL-MCNC: 15 MG/DL (ref 5–25)
CALCIUM SERPL-MCNC: 8.8 MG/DL (ref 8.3–10.1)
CHLORIDE SERPL-SCNC: 102 MMOL/L (ref 100–108)
CO2 SERPL-SCNC: 28 MMOL/L (ref 21–32)
CREAT SERPL-MCNC: 1.16 MG/DL (ref 0.6–1.3)
EOSINOPHIL # BLD AUTO: 0.19 THOUSAND/ΜL (ref 0–0.61)
EOSINOPHIL NFR BLD AUTO: 2 % (ref 0–6)
ERYTHROCYTE [DISTWIDTH] IN BLOOD BY AUTOMATED COUNT: 13.3 % (ref 11.6–15.1)
GFR SERPL CREATININE-BSD FRML MDRD: 64 ML/MIN/1.73SQ M
GLUCOSE P FAST SERPL-MCNC: 127 MG/DL (ref 65–99)
GLUCOSE SERPL-MCNC: 127 MG/DL (ref 65–140)
HCT VFR BLD AUTO: 45.6 % (ref 36.5–49.3)
HGB BLD-MCNC: 15.1 G/DL (ref 12–17)
IMM GRANULOCYTES # BLD AUTO: 0.03 THOUSAND/UL (ref 0–0.2)
IMM GRANULOCYTES NFR BLD AUTO: 0 % (ref 0–2)
INR PPP: 1.01 (ref 0.86–1.17)
LYMPHOCYTES # BLD AUTO: 2.19 THOUSANDS/ΜL (ref 0.6–4.47)
LYMPHOCYTES NFR BLD AUTO: 25 % (ref 14–44)
MCH RBC QN AUTO: 30.9 PG (ref 26.8–34.3)
MCHC RBC AUTO-ENTMCNC: 33.1 G/DL (ref 31.4–37.4)
MCV RBC AUTO: 93 FL (ref 82–98)
MONOCYTES # BLD AUTO: 0.94 THOUSAND/ΜL (ref 0.17–1.22)
MONOCYTES NFR BLD AUTO: 11 % (ref 4–12)
NEUTROPHILS # BLD AUTO: 5.22 THOUSANDS/ΜL (ref 1.85–7.62)
NEUTS SEG NFR BLD AUTO: 61 % (ref 43–75)
NRBC BLD AUTO-RTO: 0 /100 WBCS
PLATELET # BLD AUTO: 218 THOUSANDS/UL (ref 149–390)
PMV BLD AUTO: 11.5 FL (ref 8.9–12.7)
POTASSIUM SERPL-SCNC: 3.8 MMOL/L (ref 3.5–5.3)
PROTHROMBIN TIME: 13.4 SECONDS (ref 11.8–14.2)
RBC # BLD AUTO: 4.89 MILLION/UL (ref 3.88–5.62)
SODIUM SERPL-SCNC: 137 MMOL/L (ref 136–145)
WBC # BLD AUTO: 8.62 THOUSAND/UL (ref 4.31–10.16)

## 2018-08-17 PROCEDURE — C1769 GUIDE WIRE: HCPCS | Performed by: PHYSICIAN ASSISTANT

## 2018-08-17 PROCEDURE — C1887 CATHETER, GUIDING: HCPCS | Performed by: PHYSICIAN ASSISTANT

## 2018-08-17 PROCEDURE — 85025 COMPLETE CBC W/AUTO DIFF WBC: CPT | Performed by: INTERNAL MEDICINE

## 2018-08-17 PROCEDURE — 85610 PROTHROMBIN TIME: CPT | Performed by: INTERNAL MEDICINE

## 2018-08-17 PROCEDURE — 99152 MOD SED SAME PHYS/QHP 5/>YRS: CPT | Performed by: INTERNAL MEDICINE

## 2018-08-17 PROCEDURE — 93458 L HRT ARTERY/VENTRICLE ANGIO: CPT | Performed by: PHYSICIAN ASSISTANT

## 2018-08-17 PROCEDURE — 80048 BASIC METABOLIC PNL TOTAL CA: CPT | Performed by: INTERNAL MEDICINE

## 2018-08-17 PROCEDURE — 93458 L HRT ARTERY/VENTRICLE ANGIO: CPT | Performed by: INTERNAL MEDICINE

## 2018-08-17 PROCEDURE — C1894 INTRO/SHEATH, NON-LASER: HCPCS | Performed by: PHYSICIAN ASSISTANT

## 2018-08-17 PROCEDURE — 99153 MOD SED SAME PHYS/QHP EA: CPT | Performed by: PHYSICIAN ASSISTANT

## 2018-08-17 PROCEDURE — 99152 MOD SED SAME PHYS/QHP 5/>YRS: CPT | Performed by: PHYSICIAN ASSISTANT

## 2018-08-17 RX ORDER — SODIUM CHLORIDE 9 MG/ML
100 INJECTION, SOLUTION INTRAVENOUS CONTINUOUS
Status: DISCONTINUED | OUTPATIENT
Start: 2018-08-17 | End: 2018-08-17 | Stop reason: HOSPADM

## 2018-08-17 RX ORDER — NITROGLYCERIN 20 MG/100ML
INJECTION INTRAVENOUS CODE/TRAUMA/SEDATION MEDICATION
Status: COMPLETED | OUTPATIENT
Start: 2018-08-17 | End: 2018-08-17

## 2018-08-17 RX ORDER — LIDOCAINE HYDROCHLORIDE 10 MG/ML
INJECTION, SOLUTION INFILTRATION; PERINEURAL CODE/TRAUMA/SEDATION MEDICATION
Status: COMPLETED | OUTPATIENT
Start: 2018-08-17 | End: 2018-08-17

## 2018-08-17 RX ORDER — SODIUM CHLORIDE 9 MG/ML
100 INJECTION, SOLUTION INTRAVENOUS CONTINUOUS
Status: DISCONTINUED | OUTPATIENT
Start: 2018-08-17 | End: 2018-08-17

## 2018-08-17 RX ORDER — MIDAZOLAM HYDROCHLORIDE 1 MG/ML
INJECTION INTRAMUSCULAR; INTRAVENOUS CODE/TRAUMA/SEDATION MEDICATION
Status: COMPLETED | OUTPATIENT
Start: 2018-08-17 | End: 2018-08-17

## 2018-08-17 RX ORDER — ASPIRIN 81 MG/1
324 TABLET, CHEWABLE ORAL ONCE
Status: COMPLETED | OUTPATIENT
Start: 2018-08-17 | End: 2018-08-17

## 2018-08-17 RX ORDER — HEPARIN SODIUM 1000 [USP'U]/ML
INJECTION, SOLUTION INTRAVENOUS; SUBCUTANEOUS CODE/TRAUMA/SEDATION MEDICATION
Status: COMPLETED | OUTPATIENT
Start: 2018-08-17 | End: 2018-08-17

## 2018-08-17 RX ORDER — VERAPAMIL HYDROCHLORIDE 2.5 MG/ML
INJECTION, SOLUTION INTRAVENOUS CODE/TRAUMA/SEDATION MEDICATION
Status: COMPLETED | OUTPATIENT
Start: 2018-08-17 | End: 2018-08-17

## 2018-08-17 RX ORDER — FENTANYL CITRATE 50 UG/ML
INJECTION, SOLUTION INTRAMUSCULAR; INTRAVENOUS CODE/TRAUMA/SEDATION MEDICATION
Status: COMPLETED | OUTPATIENT
Start: 2018-08-17 | End: 2018-08-17

## 2018-08-17 RX ADMIN — SODIUM CHLORIDE 100 ML/HR: 0.9 INJECTION, SOLUTION INTRAVENOUS at 07:47

## 2018-08-17 RX ADMIN — IOHEXOL 70 ML: 350 INJECTION, SOLUTION INTRAVENOUS at 10:02

## 2018-08-17 RX ADMIN — HEPARIN SODIUM 4000 UNITS: 1000 INJECTION INTRAVENOUS; SUBCUTANEOUS at 09:49

## 2018-08-17 RX ADMIN — NITROGLYCERIN 200 MCG: 20 INJECTION INTRAVENOUS at 09:50

## 2018-08-17 RX ADMIN — FENTANYL CITRATE 25 MCG: 50 INJECTION, SOLUTION INTRAMUSCULAR; INTRAVENOUS at 09:46

## 2018-08-17 RX ADMIN — FENTANYL CITRATE 50 MCG: 50 INJECTION, SOLUTION INTRAMUSCULAR; INTRAVENOUS at 09:44

## 2018-08-17 RX ADMIN — VERAPAMIL HYDROCHLORIDE 2.5 MG: 2.5 INJECTION INTRAVENOUS at 09:49

## 2018-08-17 RX ADMIN — ASPIRIN 81 MG 324 MG: 81 TABLET ORAL at 07:48

## 2018-08-17 RX ADMIN — MIDAZOLAM 1 MG: 1 INJECTION INTRAMUSCULAR; INTRAVENOUS at 09:46

## 2018-08-17 RX ADMIN — LIDOCAINE HYDROCHLORIDE 1 ML: 10 INJECTION, SOLUTION INFILTRATION; PERINEURAL at 09:45

## 2018-08-17 RX ADMIN — MIDAZOLAM 2 MG: 1 INJECTION INTRAMUSCULAR; INTRAVENOUS at 09:44

## 2018-08-17 NOTE — H&P (VIEW-ONLY)
[facility]   Outpatient Note  Mathieu Mcgowan  32/20/1826  [id number]  [facility], [facility] [address] [id number] [phone]    Subjective:   Mathieu Mcgowan is a 69 y o  male    The patient is referred to the office as a new patient for evaluation and treatment with regard to SOB  He recently had an abnormal stress echo that showed WMA that was indicative of ischemia in the LAD distribution  The EKG portion of the Stress test was non-diagnostic, although there was non-specific ST changes  The patient had been feeling SOB with exertion walking up a hill or pulling weeds, for the last two years more so in the last few months  He has no chest pain or pressure per se, and felt that he should be checked out with a stress test given his family history and risk factors  His PCP ordered a stress test upon his request     He continues to have exertional dyspnea  He has a history of HTN, HLD, and obesity  He has not smoked for over 40 years  All of his brothers and father have had Coronary events and stenting             PMH/PSH  Reviewd  History   Smoking Status    Former Smoker   Smokeless Tobacco    Never Used   ,   History   Alcohol Use    Yes     Comment: SOCIAL   ,   History   Drug Use No     Family History   Problem Relation Age of Onset    Rheum arthritis Mother     Lupus Mother     Heart disease Father     Diabetes Sister     Kidney disease Sister     Heart disease Paternal Grandmother          Current Outpatient Prescriptions:     aspirin 81 MG tablet, Take 81 mg by mouth daily, Disp: , Rfl:     diphenhydrAMINE (BENADRYL) 25 mg tablet, Take 25 mg by mouth daily at bedtime as needed for itching, Disp: , Rfl:     ergocalciferol (VITAMIN D2) 50,000 units, Take 1 capsule (50,000 Units total) by mouth once a week, Disp: 4 capsule, Rfl: 0    lisinopril-hydrochlorothiazide (PRINZIDE,ZESTORETIC) 20-25 MG per tablet, Take 1 tablet by mouth daily, Disp: 90 tablet, Rfl: 3    Omega-3 Fatty Acids (FISH OIL PO), Take by mouth, Disp: , Rfl:     pravastatin (PRAVACHOL) 40 mg tablet, Take 1 tablet (40 mg total) by mouth daily, Disp: 90 tablet, Rfl: 3    zolpidem (AMBIEN) 10 mg tablet, Take 1 tablet (10 mg total) by mouth daily at bedtime as needed for sleep, Disp: 30 tablet, Rfl: 2  Allergies   Allergen Reactions    Other      Summertime grass, weeds       ROS    Objective:   /82   Pulse 90   Ht 5' 11" (1 803 m)   Wt 113 kg (250 lb)   BMI 34 87 kg/m²   Physical Exam    Lab Review:   Unremarkable      Recent Cardiac Testing:   See comments above     ECG Review:   SR RBBB NSST changes    Assessment:     Problem List Items Addressed This Visit     Hyperlipidemia    Hypertension    Relevant Medications    metoprolol tartrate (LOPRESSOR) 25 mg tablet    Dyspnea on exertion - Primary    Relevant Medications    metoprolol tartrate (LOPRESSOR) 25 mg tablet    Abnormal stress echo    Relevant Medications    metoprolol tartrate (LOPRESSOR) 25 mg tablet        Plan:   The patient will be planned for Cardiac Cath ( VIS possible)  He is given instructions and is agreeable  Arrangements will be made for first available time at [facility]  Metoprolol will be added  Return in one month

## 2018-08-17 NOTE — DISCHARGE INSTRUCTIONS
1  Please see the post cardiac catheterization dishcarge instructions  No heavy lifting, greater than 10 lbs  or strenuous  activity for 48 hrs  2 Remove band aid tomorrow  Shower and wash wrist area-  gently with soap and water- beginning tomorrow  Rinse and pat dry  Apply new water seal band aid  Repeat this process for 5 days  No powders, creams lotions or antibiotic ointments  for 5 days  No tub baths, hot tubs or swimming for 5 days  3  Please call our office ([phone]) if you have any fever, redness, swelling, discharge from your wrist access site      4 No driving for 1 day

## 2018-08-17 NOTE — INTERVAL H&P NOTE
Update:   H&P reviewed  After examining the patient, I find no changed to the H&P since it had been written  Patient re-evaluated   Accept as history and physical     Maryam Benavidez MD/August 17, 2018/10:39 AM

## 2018-08-22 LAB
ARRHY DURING EX: NORMAL
CHEST PAIN STATEMENT: NORMAL
ECG INTERP BEFORE EX: NORMAL
ECG INTERP DURING EX: NORMAL
EX SUMMARY COMMENT: NORMAL
FUNCTIONAL CAPACITY: NORMAL
MAX DIASTOLIC BP: 90 MMHG
MAX HEART RATE: 240 BPM
MAX PREDICTED HEART RATE: 151 BPM
MAX. SYSTOLIC BP: 180 MMHG
OVERALL BP RESPONSE TO EXERCISE: NORMAL
OVERALL HR RESPONSE TO EXERCISE: NORMAL
PROTOCOL NAME: NORMAL
TARGET HR FORMULA: NORMAL
TEST INDICATION: NORMAL
TIME IN EXERCISE PHASE: NORMAL

## 2018-12-13 ENCOUNTER — TELEPHONE (OUTPATIENT)
Dept: INTERNAL MEDICINE CLINIC | Facility: CLINIC | Age: 68
End: 2018-12-13

## 2018-12-13 DIAGNOSIS — R73.09 ELEVATED GLUCOSE: Primary | ICD-10-CM

## 2018-12-13 NOTE — TELEPHONE ENCOUNTER
Pt has appt with you on 12/19/18 and would like to have A1C done before so you have results for appt  He would like to do it 12/14/18, however, I do not see order in chart  Could you please put in chart for him if you are ok with this?  Thanks

## 2018-12-14 ENCOUNTER — APPOINTMENT (OUTPATIENT)
Dept: LAB | Facility: CLINIC | Age: 68
End: 2018-12-14
Payer: MEDICARE

## 2018-12-14 DIAGNOSIS — R73.09 ELEVATED GLUCOSE: ICD-10-CM

## 2018-12-14 LAB
EST. AVERAGE GLUCOSE BLD GHB EST-MCNC: 131 MG/DL
HBA1C MFR BLD: 6.2 % (ref 4.2–6.3)

## 2018-12-14 PROCEDURE — 83036 HEMOGLOBIN GLYCOSYLATED A1C: CPT

## 2018-12-14 PROCEDURE — 36415 COLL VENOUS BLD VENIPUNCTURE: CPT

## 2018-12-18 ENCOUNTER — OFFICE VISIT (OUTPATIENT)
Dept: INTERNAL MEDICINE CLINIC | Facility: CLINIC | Age: 68
End: 2018-12-18
Payer: MEDICARE

## 2018-12-18 VITALS
SYSTOLIC BLOOD PRESSURE: 134 MMHG | BODY MASS INDEX: 35.92 KG/M2 | HEART RATE: 112 BPM | TEMPERATURE: 98.3 F | WEIGHT: 256.6 LBS | HEIGHT: 71 IN | OXYGEN SATURATION: 97 % | DIASTOLIC BLOOD PRESSURE: 76 MMHG | RESPIRATION RATE: 18 BRPM

## 2018-12-18 DIAGNOSIS — F51.01 PRIMARY INSOMNIA: ICD-10-CM

## 2018-12-18 DIAGNOSIS — I10 ESSENTIAL HYPERTENSION: Primary | ICD-10-CM

## 2018-12-18 DIAGNOSIS — R73.01 ELEVATED FASTING GLUCOSE: ICD-10-CM

## 2018-12-18 DIAGNOSIS — E78.2 MIXED HYPERLIPIDEMIA: ICD-10-CM

## 2018-12-18 DIAGNOSIS — Z00.00 MEDICARE ANNUAL WELLNESS VISIT, INITIAL: ICD-10-CM

## 2018-12-18 DIAGNOSIS — Z23 ENCOUNTER FOR IMMUNIZATION: ICD-10-CM

## 2018-12-18 PROBLEM — R06.09 DYSPNEA ON EXERTION: Status: RESOLVED | Noted: 2018-08-15 | Resolved: 2018-12-18

## 2018-12-18 PROBLEM — R06.00 DYSPNEA ON EXERTION: Status: RESOLVED | Noted: 2018-08-15 | Resolved: 2018-12-18

## 2018-12-18 PROCEDURE — 90662 IIV NO PRSV INCREASED AG IM: CPT | Performed by: PHYSICIAN ASSISTANT

## 2018-12-18 PROCEDURE — G0438 PPPS, INITIAL VISIT: HCPCS | Performed by: PHYSICIAN ASSISTANT

## 2018-12-18 PROCEDURE — 99214 OFFICE O/P EST MOD 30 MIN: CPT | Performed by: PHYSICIAN ASSISTANT

## 2018-12-18 PROCEDURE — G0008 ADMIN INFLUENZA VIRUS VAC: HCPCS | Performed by: PHYSICIAN ASSISTANT

## 2018-12-18 RX ORDER — TRAZODONE HYDROCHLORIDE 50 MG/1
50-100 TABLET ORAL
Qty: 60 TABLET | Refills: 0 | Status: SHIPPED | OUTPATIENT
Start: 2018-12-18 | End: 2019-09-26

## 2018-12-18 NOTE — PROGRESS NOTES
Assessment and Plan:    Problem List Items Addressed This Visit     None        Health Maintenance Due   Topic Date Due    Hepatitis C Screening  1950   [facility] P[employer] Medicare Annual Wellness Visit (AWV)  1950    CRC Screening: Colonoscopy  1950    DTaP,Tdap,and Td Vaccines (1 - Tdap) 11/16/1969    Fall Risk  11/16/2013    INFLUENZA VACCINE  07/01/2018         HPI:  Jurgen Holley is a 77 y o  male here for his Initial Wellness Visit      Patient Active Problem List   Diagnosis    Primary insomnia    Hyperlipidemia    Hypertension    Dyspnea on exertion    Abnormal stress echo     Past Medical History:   Diagnosis Date    Arthritis     Hyperlipidemia     Hypertension     Obesity      Past Surgical History:   Procedure Laterality Date    CARDIAC SURGERY      heart catherization    COLONOSCOPY      ELBOW SURGERY      HAND SURGERY Bilateral     KNEE SURGERY      WRIST SURGERY       Family History   Problem Relation Age of Onset    Rheum arthritis Mother     Lupus Mother     Heart disease Father     Diabetes Sister     Kidney disease Sister     Heart disease Paternal Grandmother      History   Smoking Status    Former Smoker   Smokeless Tobacco    Never Used     History   Alcohol Use    Yes     Comment: SOCIAL      History   Drug Use No       Current Outpatient Prescriptions   Medication Sig Dispense Refill    aspirin 81 MG tablet Take 81 mg by mouth daily      diphenhydrAMINE (BENADRYL) 25 mg tablet Take 25 mg by mouth daily at bedtime as needed for itching      lisinopril-hydrochlorothiazide (PRINZIDE,ZESTORETIC) 20-25 MG per tablet Take 1 tablet by mouth daily 90 tablet 3    pravastatin (PRAVACHOL) 40 mg tablet Take 1 tablet (40 mg total) by mouth daily 90 tablet 3    zolpidem (AMBIEN) 10 mg tablet Take 1 tablet (10 mg total) by mouth daily at bedtime as needed for sleep 30 tablet 2    metoprolol tartrate (LOPRESSOR) 25 mg tablet Take 1 tablet (25 mg total) by mouth every 12 (twelve) hours (Patient not taking: Reported on 12/18/2018 ) 60 tablet 3     No current facility-administered medications for this visit  Allergies   Allergen Reactions    Other      Summertime grass, weeds     Immunization History   Administered Date(s) Administered    Pneumococcal Conjugate 13-Valent 06/19/2018       Patient Care Team:  Ever Torrez PA-C as PCP - General (Internal Medicine)    Medicare Screening Tests and Risk Assessments:      Health Risk Assessment:  Patient rates overall health as good  Patient feels that their physical health rating is Same  Eyesight was rated as Same  Hearing was rated as Same  Patient feels that their emotional and mental health rating is Same  Pain experienced by patient in the last 7 days has been Some  Patient's pain rating has been 4/10  Emotional/Mental Health:  Patient has been feeling nervous/anxious  PHQ-9 Depression Screening:    Frequency of the following problems over the past two weeks:      1  Little interest or pleasure in doing things: 0 - not at all      2  Feeling down, depressed, or hopeless: 0 - not at all  PHQ-2 Score: 0          Broken Bones/Falls: Fall Risk Assessment:    In the past year, patient has experienced: No history of falling in past year          Bladder/Bowel:  Patient has not leaked urine accidently in the last six months  Patient reports no loss of bowel control  Immunizations:  Patient has had a flu vaccination within the last year  Patient has received a pneumonia shot  Patient has not received a shingles shot  Patient has not received tetanus/diphtheria shot  Home Safety:  Patient does not have trouble with stairs inside or outside of their home  Patient currently reports that there are no safety hazards present in home, working smoke alarms, working carbon monoxide detectors        Preventative Screenings:   prostate cancer screen performed, 6/25/2018  colon cancer screen completed, 9/12/2017  cholesterol screen completed, 6/25/2018  no glaucoma eye exam completed    Nutrition:  Current diet: Low Carb and Diabetic with servings of the following:    Medications:  Patient is currently taking over-the-counter supplements  List of OTC medications includes: ASA  Patient is able to manage medications  Lifestyle Choices:  Patient reports no tobacco use  Patient has smoked or used tobacco in the past   Patient has stopped his tobacco use  Patient reports alcohol use  Patient drives a vehicle  Patient wears seat belt  Current level of exercise of physical activity described by patient as: SOME  Activities of Daily Living:  Can get out of bed by his or her self, able to dress self, able to make own meals, able to do own shopping, able to bathe self, can do own laundry/housekeeping, can manage own money, pay bills and track expenses    Previous Hospitalizations:  Hospitalization or ED visit in past 12 months  Number of hospitalizations within the last year: 1-2        Advanced Directives:  Patient has decided on a power of   Patient has spoken to designated power of   Patient has completed advanced directive  Preventative Screening/Counseling:      Cardiovascular:      General: Screening Current          Diabetes:      General: Screening Current          Colorectal Cancer:      General: Screening Current          Prostate Cancer:      General: Screening Current          Osteoporosis:      General: Screening Not Indicated          AAA:      General: Screening Not Indicated          Glaucoma:      General: Screening Not Indicated          HIV:      General: Screening Not Indicated          Hepatitis C:      Counseling: has received general HCV counseling        Advanced Directives:   Patient has living will for healthcare, has durable POA for healthcare, patient has an advanced directive  Information on ACP and/or AD not provided  No 5 wishes given   End of life assessment reviewed with patient  Provider agrees with end of life decisions        Immunizations:      Influenza: Influenza Due Today      Pneumococcal: Lifetime Vaccine Completed      Shingrix: Risks & Benefits Discussed      Hepatitis B (Low risk patients): Series Not Indicated      Zostavax: Risks & Benefits Discussed      TDAP: Tdap Vaccine UTD

## 2018-12-18 NOTE — PROGRESS NOTES
Assessment/Plan:    Primary insomnia  Will try pt with trazodone to combat insomnia  Directions for use discussed  Elevated fasting glucose  A1C borderline at 6 2  We discussed diet modification and pt verbalized understanding of this  Hypertension  Pts symptoms are stable with current regime  No changes at present  Diagnoses and all orders for this visit:    Encounter for immunization  -     PREFERRED: influenza vaccine, 1428-1221, high-dose, PF 0 5 mL, for patients 65 yr+ (FLUZONE HIGH-DOSE)    Primary insomnia  -     traZODone (DESYREL) 50 mg tablet; Take 1-2 tablets ( mg total) by mouth daily at bedtime    Essential hypertension    Mixed hyperlipidemia    Elevated fasting glucose          Subjective:      Patient ID: Rosaura Blood is a 77 y o  male  Pt presents for routine visit  Labs are up to date as well as colonoscopy  He recently underwent stress test which was positive  He then had a cardiac catheterization which was clean and did not require any stenting  He is feeling well  He is continued with his statin, metoprolol, ACE and baby ASA  He recently had an A1C that was borderline at 6 2  He complains of ongoing insomnia  Ambien does not provide great benefit and it makes him groggy in the morning  The following portions of the patient's history were reviewed and updated as appropriate: He  has a past medical history of Arthritis; Hyperlipidemia; Hypertension; and Obesity  He   Patient Active Problem List    Diagnosis Date Noted    Elevated fasting glucose 12/18/2018    Abnormal stress echo 08/15/2018    Primary insomnia 06/19/2018    Hyperlipidemia 06/19/2018    Hypertension 06/19/2018     He  has a past surgical history that includes Knee surgery; Hand surgery (Bilateral); Wrist surgery; Elbow surgery; Colonoscopy; and Cardiac surgery  His family history includes Diabetes in his sister;  Heart disease in his father and paternal grandmother; Kidney disease in his sister; Lupus in his mother; Rheum arthritis in his mother  He  reports that he has quit smoking  He has never used smokeless tobacco  He reports that he drinks alcohol  He reports that he does not use drugs  Current Outpatient Prescriptions   Medication Sig Dispense Refill    aspirin 81 MG tablet Take 81 mg by mouth daily      diphenhydrAMINE (BENADRYL) 25 mg tablet Take 25 mg by mouth daily at bedtime as needed for itching      lisinopril-hydrochlorothiazide (PRINZIDE,ZESTORETIC) 20-25 MG per tablet Take 1 tablet by mouth daily 90 tablet 3    pravastatin (PRAVACHOL) 40 mg tablet Take 1 tablet (40 mg total) by mouth daily 90 tablet 3    zolpidem (AMBIEN) 10 mg tablet Take 1 tablet (10 mg total) by mouth daily at bedtime as needed for sleep 30 tablet 2    metoprolol tartrate (LOPRESSOR) 25 mg tablet Take 1 tablet (25 mg total) by mouth every 12 (twelve) hours (Patient not taking: Reported on 12/18/2018 ) 60 tablet 3    traZODone (DESYREL) 50 mg tablet Take 1-2 tablets ( mg total) by mouth daily at bedtime 60 tablet 0     No current facility-administered medications for this visit        Current Outpatient Prescriptions on File Prior to Visit   Medication Sig    aspirin 81 MG tablet Take 81 mg by mouth daily    diphenhydrAMINE (BENADRYL) 25 mg tablet Take 25 mg by mouth daily at bedtime as needed for itching    lisinopril-hydrochlorothiazide (PRINZIDE,ZESTORETIC) 20-25 MG per tablet Take 1 tablet by mouth daily    pravastatin (PRAVACHOL) 40 mg tablet Take 1 tablet (40 mg total) by mouth daily    zolpidem (AMBIEN) 10 mg tablet Take 1 tablet (10 mg total) by mouth daily at bedtime as needed for sleep    metoprolol tartrate (LOPRESSOR) 25 mg tablet Take 1 tablet (25 mg total) by mouth every 12 (twelve) hours (Patient not taking: Reported on 12/18/2018 )    [DISCONTINUED] ergocalciferol (VITAMIN D2) 50,000 units Take 1 capsule (50,000 Units total) by mouth once a week (Patient not taking: Reported on 12/18/2018 )     No current facility-administered medications on file prior to visit  He is allergic to other       Review of Systems   Constitutional: Negative for chills and fever  HENT: Negative for congestion, ear pain, hearing loss, postnasal drip, rhinorrhea, sinus pain, sinus pressure, sore throat and trouble swallowing  Eyes: Negative for pain and visual disturbance  Respiratory: Negative for cough, chest tightness, shortness of breath and wheezing  Cardiovascular: Negative  Negative for chest pain, palpitations and leg swelling  Gastrointestinal: Negative for abdominal pain, blood in stool, constipation, diarrhea, nausea and vomiting  Endocrine: Negative for cold intolerance, heat intolerance, polydipsia, polyphagia and polyuria  Genitourinary: Negative for difficulty urinating, dysuria, flank pain and urgency  Musculoskeletal: Negative for arthralgias, back pain, gait problem and myalgias  Skin: Negative for rash  Allergic/Immunologic: Negative  Neurological: Negative for dizziness, weakness, light-headedness and headaches  Hematological: Negative  Psychiatric/Behavioral: Positive for sleep disturbance  Negative for behavioral problems and dysphoric mood  The patient is not nervous/anxious  Objective:      /76 (BP Location: Left arm, Patient Position: Sitting, Cuff Size: Large)   Pulse (!) 112   Temp 98 3 °F (36 8 °C)   Resp 18   Ht 5' 11" (1 803 m)   Wt 116 kg (256 lb 9 6 oz)   SpO2 97%   BMI 35 79 kg/m²          Physical Exam   Constitutional: He is oriented to person, place, and time  He appears well-developed and well-nourished  No distress  HENT:   Head: Normocephalic and atraumatic  Right Ear: External ear normal    Left Ear: External ear normal    Nose: Nose normal    Mouth/Throat: Oropharynx is clear and moist  No oropharyngeal exudate  Eyes: Pupils are equal, round, and reactive to light   Conjunctivae and EOM are normal  Right eye exhibits no discharge  Left eye exhibits no discharge  No scleral icterus  Neck: Normal range of motion  Neck supple  No thyromegaly present  Cardiovascular: Normal rate, regular rhythm and normal heart sounds  Exam reveals no gallop and no friction rub  No murmur heard  Pulmonary/Chest: Effort normal and breath sounds normal  No respiratory distress  He has no wheezes  He has no rales  Abdominal: Soft  Bowel sounds are normal  He exhibits no distension  There is no tenderness  Musculoskeletal: Normal range of motion  He exhibits no edema, tenderness or deformity  Neurological: He is alert and oriented to person, place, and time  No cranial nerve deficit  Skin: Skin is warm and dry  He is not diaphoretic  Psychiatric: He has a normal mood and affect   His behavior is normal  Judgment and thought content normal

## 2019-04-18 ENCOUNTER — OFFICE VISIT (OUTPATIENT)
Dept: INTERNAL MEDICINE CLINIC | Facility: CLINIC | Age: 69
End: 2019-04-18
Payer: MEDICARE

## 2019-04-18 VITALS
SYSTOLIC BLOOD PRESSURE: 116 MMHG | HEIGHT: 71 IN | TEMPERATURE: 99.1 F | BODY MASS INDEX: 36.57 KG/M2 | OXYGEN SATURATION: 94 % | WEIGHT: 261.2 LBS | DIASTOLIC BLOOD PRESSURE: 74 MMHG | HEART RATE: 73 BPM | RESPIRATION RATE: 18 BRPM

## 2019-04-18 DIAGNOSIS — I10 ESSENTIAL HYPERTENSION: Primary | ICD-10-CM

## 2019-04-18 DIAGNOSIS — E66.9 OBESITY (BMI 35.0-39.9 WITHOUT COMORBIDITY): ICD-10-CM

## 2019-04-18 DIAGNOSIS — E55.9 VITAMIN D DEFICIENCY: ICD-10-CM

## 2019-04-18 DIAGNOSIS — Z11.59 NEED FOR HEPATITIS C SCREENING TEST: ICD-10-CM

## 2019-04-18 DIAGNOSIS — R73.9 ELEVATED BLOOD SUGAR: ICD-10-CM

## 2019-04-18 DIAGNOSIS — E78.2 MIXED HYPERLIPIDEMIA: ICD-10-CM

## 2019-04-18 DIAGNOSIS — R06.00 DYSPNEA ON EXERTION: ICD-10-CM

## 2019-04-18 DIAGNOSIS — R94.39 ABNORMAL STRESS ECHO: ICD-10-CM

## 2019-04-18 DIAGNOSIS — I10 HYPERTENSION, UNSPECIFIED TYPE: ICD-10-CM

## 2019-04-18 LAB — SL AMB POCT HEMOGLOBIN AIC: 5.8 (ref ?–6.5)

## 2019-04-18 PROCEDURE — 99214 OFFICE O/P EST MOD 30 MIN: CPT | Performed by: PHYSICIAN ASSISTANT

## 2019-04-18 PROCEDURE — 83036 HEMOGLOBIN GLYCOSYLATED A1C: CPT | Performed by: PHYSICIAN ASSISTANT

## 2019-04-18 RX ORDER — LISINOPRIL AND HYDROCHLOROTHIAZIDE 25; 20 MG/1; MG/1
1 TABLET ORAL DAILY
Qty: 90 TABLET | Refills: 5 | Status: SHIPPED | OUTPATIENT
Start: 2019-04-18 | End: 2020-04-28

## 2019-05-13 ENCOUNTER — OFFICE VISIT (OUTPATIENT)
Dept: INTERNAL MEDICINE CLINIC | Facility: CLINIC | Age: 69
End: 2019-05-13
Payer: MEDICARE

## 2019-05-13 VITALS
WEIGHT: 261.8 LBS | RESPIRATION RATE: 18 BRPM | OXYGEN SATURATION: 95 % | DIASTOLIC BLOOD PRESSURE: 84 MMHG | TEMPERATURE: 100.3 F | HEIGHT: 71 IN | SYSTOLIC BLOOD PRESSURE: 140 MMHG | BODY MASS INDEX: 36.65 KG/M2 | HEART RATE: 126 BPM

## 2019-05-13 DIAGNOSIS — J01.10 ACUTE NON-RECURRENT FRONTAL SINUSITIS: Primary | ICD-10-CM

## 2019-05-13 DIAGNOSIS — R05.9 COUGH: ICD-10-CM

## 2019-05-13 PROCEDURE — 99213 OFFICE O/P EST LOW 20 MIN: CPT | Performed by: NURSE PRACTITIONER

## 2019-05-13 RX ORDER — PROMETHAZINE HYDROCHLORIDE AND CODEINE PHOSPHATE 6.25; 1 MG/5ML; MG/5ML
5 SYRUP ORAL EVERY 4 HOURS PRN
Qty: 120 ML | Refills: 0 | Status: SHIPPED | OUTPATIENT
Start: 2019-05-13 | End: 2019-09-26

## 2019-05-13 RX ORDER — AMOXICILLIN AND CLAVULANATE POTASSIUM 600; 42.9 MG/5ML; MG/5ML
600 POWDER, FOR SUSPENSION ORAL 2 TIMES DAILY
Qty: 70 ML | Refills: 0 | Status: SHIPPED | OUTPATIENT
Start: 2019-05-13 | End: 2019-05-20

## 2019-08-19 DIAGNOSIS — E78.5 HYPERLIPIDEMIA, UNSPECIFIED HYPERLIPIDEMIA TYPE: ICD-10-CM

## 2019-08-20 RX ORDER — PRAVASTATIN SODIUM 40 MG
40 TABLET ORAL DAILY
Qty: 90 TABLET | Refills: 3 | Status: SHIPPED | OUTPATIENT
Start: 2019-08-20 | End: 2020-08-13

## 2019-09-17 ENCOUNTER — APPOINTMENT (OUTPATIENT)
Dept: LAB | Facility: CLINIC | Age: 69
End: 2019-09-17
Payer: MEDICARE

## 2019-09-17 DIAGNOSIS — Z11.59 NEED FOR HEPATITIS C SCREENING TEST: ICD-10-CM

## 2019-09-17 LAB
25(OH)D3 SERPL-MCNC: 13.7 NG/ML (ref 30–100)
ALBUMIN SERPL BCP-MCNC: 4.3 G/DL (ref 3.5–5)
ALP SERPL-CCNC: 50 U/L (ref 46–116)
ALT SERPL W P-5'-P-CCNC: 47 U/L (ref 12–78)
ANION GAP SERPL CALCULATED.3IONS-SCNC: 9 MMOL/L (ref 4–13)
AST SERPL W P-5'-P-CCNC: 22 U/L (ref 5–45)
BASOPHILS # BLD AUTO: 0.05 THOUSANDS/ΜL (ref 0–0.1)
BASOPHILS NFR BLD AUTO: 1 % (ref 0–1)
BILIRUB SERPL-MCNC: 0.9 MG/DL (ref 0.2–1)
BUN SERPL-MCNC: 17 MG/DL (ref 5–25)
CALCIUM SERPL-MCNC: 9.1 MG/DL (ref 8.3–10.1)
CHLORIDE SERPL-SCNC: 101 MMOL/L (ref 100–108)
CHOLEST SERPL-MCNC: 141 MG/DL (ref 50–200)
CO2 SERPL-SCNC: 29 MMOL/L (ref 21–32)
CREAT SERPL-MCNC: 1.08 MG/DL (ref 0.6–1.3)
EOSINOPHIL # BLD AUTO: 0.39 THOUSAND/ΜL (ref 0–0.61)
EOSINOPHIL NFR BLD AUTO: 4 % (ref 0–6)
ERYTHROCYTE [DISTWIDTH] IN BLOOD BY AUTOMATED COUNT: 13.3 % (ref 11.6–15.1)
GFR SERPL CREATININE-BSD FRML MDRD: 69 ML/MIN/1.73SQ M
GLUCOSE P FAST SERPL-MCNC: 122 MG/DL (ref 65–99)
HCT VFR BLD AUTO: 45.4 % (ref 36.5–49.3)
HCV AB SER QL: NORMAL
HDLC SERPL-MCNC: 45 MG/DL (ref 40–60)
HGB BLD-MCNC: 14.9 G/DL (ref 12–17)
IMM GRANULOCYTES # BLD AUTO: 0.04 THOUSAND/UL (ref 0–0.2)
IMM GRANULOCYTES NFR BLD AUTO: 0 % (ref 0–2)
LDLC SERPL CALC-MCNC: 72 MG/DL (ref 0–100)
LYMPHOCYTES # BLD AUTO: 2.74 THOUSANDS/ΜL (ref 0.6–4.47)
LYMPHOCYTES NFR BLD AUTO: 30 % (ref 14–44)
MCH RBC QN AUTO: 31.4 PG (ref 26.8–34.3)
MCHC RBC AUTO-ENTMCNC: 32.8 G/DL (ref 31.4–37.4)
MCV RBC AUTO: 96 FL (ref 82–98)
MONOCYTES # BLD AUTO: 0.88 THOUSAND/ΜL (ref 0.17–1.22)
MONOCYTES NFR BLD AUTO: 10 % (ref 4–12)
NEUTROPHILS # BLD AUTO: 4.94 THOUSANDS/ΜL (ref 1.85–7.62)
NEUTS SEG NFR BLD AUTO: 55 % (ref 43–75)
NRBC BLD AUTO-RTO: 0 /100 WBCS
PLATELET # BLD AUTO: 193 THOUSANDS/UL (ref 149–390)
PMV BLD AUTO: 12.4 FL (ref 8.9–12.7)
POTASSIUM SERPL-SCNC: 4.1 MMOL/L (ref 3.5–5.3)
PROT SERPL-MCNC: 7.5 G/DL (ref 6.4–8.2)
RBC # BLD AUTO: 4.74 MILLION/UL (ref 3.88–5.62)
SODIUM SERPL-SCNC: 139 MMOL/L (ref 136–145)
TRIGL SERPL-MCNC: 118 MG/DL
TSH SERPL DL<=0.05 MIU/L-ACNC: 1.02 UIU/ML (ref 0.36–3.74)
WBC # BLD AUTO: 9.04 THOUSAND/UL (ref 4.31–10.16)

## 2019-09-17 PROCEDURE — 86803 HEPATITIS C AB TEST: CPT

## 2019-09-17 PROCEDURE — 36415 COLL VENOUS BLD VENIPUNCTURE: CPT | Performed by: PHYSICIAN ASSISTANT

## 2019-09-17 PROCEDURE — 85025 COMPLETE CBC W/AUTO DIFF WBC: CPT | Performed by: PHYSICIAN ASSISTANT

## 2019-09-17 PROCEDURE — 80061 LIPID PANEL: CPT | Performed by: PHYSICIAN ASSISTANT

## 2019-09-17 PROCEDURE — 82306 VITAMIN D 25 HYDROXY: CPT | Performed by: PHYSICIAN ASSISTANT

## 2019-09-17 PROCEDURE — 84443 ASSAY THYROID STIM HORMONE: CPT | Performed by: PHYSICIAN ASSISTANT

## 2019-09-17 PROCEDURE — 80053 COMPREHEN METABOLIC PANEL: CPT | Performed by: PHYSICIAN ASSISTANT

## 2019-09-26 ENCOUNTER — OFFICE VISIT (OUTPATIENT)
Dept: INTERNAL MEDICINE CLINIC | Facility: CLINIC | Age: 69
End: 2019-09-26
Payer: MEDICARE

## 2019-09-26 VITALS
WEIGHT: 266 LBS | DIASTOLIC BLOOD PRESSURE: 70 MMHG | HEART RATE: 72 BPM | HEIGHT: 71 IN | SYSTOLIC BLOOD PRESSURE: 122 MMHG | TEMPERATURE: 97.8 F | RESPIRATION RATE: 16 BRPM | BODY MASS INDEX: 37.24 KG/M2

## 2019-09-26 DIAGNOSIS — I10 ESSENTIAL HYPERTENSION: ICD-10-CM

## 2019-09-26 DIAGNOSIS — G25.81 RESTLESS LEGS SYNDROME: Primary | ICD-10-CM

## 2019-09-26 DIAGNOSIS — E55.9 VITAMIN D DEFICIENCY: ICD-10-CM

## 2019-09-26 PROBLEM — R05.9 COUGH: Status: RESOLVED | Noted: 2019-05-13 | Resolved: 2019-09-26

## 2019-09-26 PROBLEM — J01.10 ACUTE NON-RECURRENT FRONTAL SINUSITIS: Status: RESOLVED | Noted: 2019-05-13 | Resolved: 2019-09-26

## 2019-09-26 PROCEDURE — 99214 OFFICE O/P EST MOD 30 MIN: CPT | Performed by: PHYSICIAN ASSISTANT

## 2019-09-26 RX ORDER — ROPINIROLE 2 MG/1
2 TABLET, FILM COATED ORAL
Qty: 30 TABLET | Refills: 5 | Status: SHIPPED | OUTPATIENT
Start: 2019-09-26 | End: 2020-01-30

## 2019-09-26 RX ORDER — ERGOCALCIFEROL 1.25 MG/1
50000 CAPSULE ORAL WEEKLY
Qty: 4 CAPSULE | Refills: 3 | Status: SHIPPED | OUTPATIENT
Start: 2019-09-26 | End: 2020-08-10 | Stop reason: SDUPTHER

## 2019-09-26 NOTE — ASSESSMENT & PLAN NOTE
Start requip each evening at bed  Directions for use and possible side effects discussed and patient verbalized understanding of these

## 2019-09-26 NOTE — PROGRESS NOTES
Assessment/Plan:  Problem List Items Addressed This Visit        Cardiovascular and Mediastinum    Hypertension     Pts symptoms are stable with current regime  No changes at present  Other    Restless legs syndrome - Primary     Start requip each evening at bed  Directions for use and possible side effects discussed and patient verbalized understanding of these  Relevant Medications    rOPINIRole (REQUIP) 2 mg tablet      Other Visit Diagnoses     Vitamin D deficiency        Relevant Medications    ergocalciferol (VITAMIN D2) 50,000 units           Diagnoses and all orders for this visit:    Restless legs syndrome  -     rOPINIRole (REQUIP) 2 mg tablet; Take 1 tablet (2 mg total) by mouth daily at bedtime    Vitamin D deficiency  -     ergocalciferol (VITAMIN D2) 50,000 units; Take 1 capsule (50,000 Units total) by mouth once a week    Essential hypertension        Hypertension  Pts symptoms are stable with current regime  No changes at present  Restless legs syndrome  Start requip each evening at bed  Directions for use and possible side effects discussed and patient verbalized understanding of these  Subjective:      Patient ID: Silva Herrmann is a 77 y o  male  Pt presents for routine visit  He is doing well overall  He is complaining of increased tingling and creepy/crawly feeling in his bilateral lower legs  It is worse when he is at rest at night and resolves when he is up and doing things  BP is well controlled  Labs reviewed and all normal        The following portions of the patient's history were reviewed and updated as appropriate:   He has a past medical history of Arthritis, Hyperlipidemia, Hypertension, and Obesity  ,  does not have any pertinent problems on file  ,   has a past surgical history that includes Knee surgery; Hand surgery (Bilateral); Wrist surgery; Elbow surgery; Colonoscopy; and Cardiac surgery  ,  family history includes Diabetes in his sister; Heart disease in his father and paternal grandmother; Kidney disease in his sister; Lupus in his mother; Rheum arthritis in his mother  ,   reports that he has quit smoking  He has never used smokeless tobacco  He reports that he drinks alcohol  He reports that he does not use drugs  ,  is allergic to other     Current Outpatient Medications   Medication Sig Dispense Refill    aspirin 81 MG tablet Take 81 mg by mouth daily      diphenhydrAMINE (BENADRYL) 25 mg tablet Take 25 mg by mouth daily at bedtime as needed for itching      lisinopril-hydrochlorothiazide (PRINZIDE,ZESTORETIC) 20-25 MG per tablet Take 1 tablet by mouth daily 90 tablet 5    metoprolol tartrate (LOPRESSOR) 25 mg tablet Take 1 tablet (25 mg total) by mouth every 12 (twelve) hours (Patient taking differently: Take 25 mg by mouth daily ) 60 tablet 5    pravastatin (PRAVACHOL) 40 mg tablet Take 1 tablet (40 mg total) by mouth daily 90 tablet 3    zolpidem (AMBIEN) 10 mg tablet Take 1 tablet (10 mg total) by mouth daily at bedtime as needed for sleep 30 tablet 2    ergocalciferol (VITAMIN D2) 50,000 units Take 1 capsule (50,000 Units total) by mouth once a week 4 capsule 3    rOPINIRole (REQUIP) 2 mg tablet Take 1 tablet (2 mg total) by mouth daily at bedtime 30 tablet 5     No current facility-administered medications for this visit  Review of Systems   Constitutional: Negative for chills and fever  HENT: Negative for congestion, ear pain, hearing loss, postnasal drip, rhinorrhea, sinus pressure, sinus pain, sore throat and trouble swallowing  Eyes: Negative for pain and visual disturbance  Respiratory: Negative for cough, chest tightness, shortness of breath and wheezing  Cardiovascular: Negative  Negative for chest pain, palpitations and leg swelling  Gastrointestinal: Negative for abdominal pain, blood in stool, constipation, diarrhea, nausea and vomiting     Endocrine: Negative for cold intolerance, heat intolerance, polydipsia, polyphagia and polyuria  Genitourinary: Negative for difficulty urinating, dysuria, flank pain and urgency  Musculoskeletal: Negative for arthralgias, back pain, gait problem and myalgias  Skin: Negative for rash  Allergic/Immunologic: Negative  Neurological: Negative for dizziness, weakness, light-headedness and headaches  Hematological: Negative  Psychiatric/Behavioral: Negative for behavioral problems, dysphoric mood and sleep disturbance  The patient is not nervous/anxious  PHQ-9 Depression Screening    PHQ-9:    Frequency of the following problems over the past two weeks:       Little interest or pleasure in doing things:  0 - not at all  Feeling down, depressed, or hopeless:  0 - not at all  PHQ-2 Score:  0          Objective:  Vitals:    09/26/19 0819   BP: 122/70   Pulse: 72   Resp: 16   Temp: 97 8 °F (36 6 °C)   TempSrc: Tympanic   Weight: 121 kg (266 lb)   Height: 5' 11" (1 803 m)     Body mass index is 37 1 kg/m²  Physical Exam   Constitutional: He is oriented to person, place, and time  He appears well-developed and well-nourished  No distress  HENT:   Head: Normocephalic and atraumatic  Right Ear: External ear normal    Left Ear: External ear normal    Nose: Nose normal    Mouth/Throat: Oropharynx is clear and moist  No oropharyngeal exudate  Eyes: Pupils are equal, round, and reactive to light  Conjunctivae and EOM are normal  Right eye exhibits no discharge  Left eye exhibits no discharge  No scleral icterus  Neck: Normal range of motion  Neck supple  No thyromegaly present  Cardiovascular: Normal rate, regular rhythm and normal heart sounds  Exam reveals no gallop and no friction rub  No murmur heard  Pulmonary/Chest: Effort normal and breath sounds normal  No respiratory distress  He has no wheezes  He has no rales  Abdominal: Soft  Bowel sounds are normal  He exhibits no distension  There is no tenderness     Musculoskeletal: Normal range of motion  He exhibits no edema, tenderness or deformity  Neurological: He is alert and oriented to person, place, and time  No cranial nerve deficit  Skin: Skin is warm and dry  He is not diaphoretic  Psychiatric: He has a normal mood and affect   His behavior is normal  Judgment and thought content normal

## 2020-01-30 ENCOUNTER — OFFICE VISIT (OUTPATIENT)
Dept: INTERNAL MEDICINE CLINIC | Facility: CLINIC | Age: 70
End: 2020-01-30
Payer: MEDICARE

## 2020-01-30 VITALS
DIASTOLIC BLOOD PRESSURE: 62 MMHG | RESPIRATION RATE: 16 BRPM | HEART RATE: 78 BPM | SYSTOLIC BLOOD PRESSURE: 126 MMHG | TEMPERATURE: 97.1 F | BODY MASS INDEX: 36.68 KG/M2 | WEIGHT: 262 LBS | HEIGHT: 71 IN

## 2020-01-30 DIAGNOSIS — R73.01 ELEVATED FASTING GLUCOSE: ICD-10-CM

## 2020-01-30 DIAGNOSIS — Z00.00 MEDICARE ANNUAL WELLNESS VISIT, SUBSEQUENT: ICD-10-CM

## 2020-01-30 DIAGNOSIS — I10 ESSENTIAL HYPERTENSION: Primary | ICD-10-CM

## 2020-01-30 DIAGNOSIS — R73.09 ELEVATED GLUCOSE: ICD-10-CM

## 2020-01-30 DIAGNOSIS — R09.82 POST-NASAL DRIP: ICD-10-CM

## 2020-01-30 LAB — SL AMB POCT HEMOGLOBIN AIC: 5.8 (ref ?–6.5)

## 2020-01-30 PROCEDURE — 83036 HEMOGLOBIN GLYCOSYLATED A1C: CPT | Performed by: PHYSICIAN ASSISTANT

## 2020-01-30 PROCEDURE — G0439 PPPS, SUBSEQ VISIT: HCPCS | Performed by: PHYSICIAN ASSISTANT

## 2020-01-30 PROCEDURE — 99214 OFFICE O/P EST MOD 30 MIN: CPT | Performed by: PHYSICIAN ASSISTANT

## 2020-01-30 PROCEDURE — 1123F ACP DISCUSS/DSCN MKR DOCD: CPT | Performed by: PHYSICIAN ASSISTANT

## 2020-01-30 NOTE — PROGRESS NOTES
Assessment and Plan:     Problem List Items Addressed This Visit     None      Visit Diagnoses     Elevated glucose    -  Primary        BMI Counseling: Body mass index is 36 54 kg/m²  The BMI is above normal  Nutrition recommendations include decreasing portion sizes, consuming healthier snacks and limiting drinks that contain sugar  Preventive health issues were discussed with patient, and age appropriate screening tests were ordered as noted in patient's After Visit Summary  Personalized health advice and appropriate referrals for health education or preventive services given if needed, as noted in patient's After Visit Summary       History of Present Illness:     Patient presents for Medicare Annual Wellness visit    Patient Care Team:  Cyndee Benitez PA-C as PCP - General (Internal Medicine)     Problem List:     Patient Active Problem List   Diagnosis    Primary insomnia    Hyperlipidemia    Hypertension    Abnormal stress echo    Elevated fasting glucose    Restless legs syndrome      Past Medical and Surgical History:     Past Medical History:   Diagnosis Date    Arthritis     Hyperlipidemia     Hypertension     Obesity      Past Surgical History:   Procedure Laterality Date    CARDIAC SURGERY      heart catherization    COLONOSCOPY      ELBOW SURGERY      HAND SURGERY Bilateral     KNEE SURGERY      WRIST SURGERY        Family History:     Family History   Problem Relation Age of Onset    Rheum arthritis Mother     Lupus Mother     Heart disease Father     Diabetes Sister     Kidney disease Sister     Heart disease Paternal Grandmother       Social History:     Social History     Socioeconomic History    Marital status: /Civil Union     Spouse name: None    Number of children: None    Years of education: None    Highest education level: None   Occupational History    Occupation: RETIRED   Social Needs    Financial resource strain: None    Food insecurity: Worry: None     Inability: None    Transportation needs:     Medical: None     Non-medical: None   Tobacco Use    Smoking status: Former Smoker    Smokeless tobacco: Never Used   Substance and Sexual Activity    Alcohol use: Yes     Comment: SOCIAL    Drug use: No    Sexual activity: None   Lifestyle    Physical activity:     Days per week: None     Minutes per session: None    Stress: None   Relationships    Social connections:     Talks on phone: None     Gets together: None     Attends [employer] service: None     Active member of club or organization: None     Attends meetings of clubs or organizations: None     Relationship status: None    Intimate partner violence:     Fear of current or ex partner: None     Emotionally abused: None     Physically abused: None     Forced sexual activity: None   Other Topics Concern    None   Social History Narrative    RETIRED           Medications and Allergies:     Current Outpatient Medications   Medication Sig Dispense Refill    aspirin 81 MG tablet Take 81 mg by mouth daily      diphenhydrAMINE (BENADRYL) 25 mg tablet Take 25 mg by mouth daily at bedtime as needed for itching      ergocalciferol (VITAMIN D2) 50,000 units Take 1 capsule (50,000 Units total) by mouth once a week 4 capsule 3    lisinopril-hydrochlorothiazide (PRINZIDE,ZESTORETIC) 20-25 MG per tablet Take 1 tablet by mouth daily 90 tablet 5    metoprolol tartrate (LOPRESSOR) 25 mg tablet Take 1 tablet (25 mg total) by mouth every 12 (twelve) hours (Patient taking differently: Take 25 mg by mouth daily ) 60 tablet 5    pravastatin (PRAVACHOL) 40 mg tablet Take 1 tablet (40 mg total) by mouth daily 90 tablet 3     No current facility-administered medications for this visit        Allergies   Allergen Reactions    Other      Summertime grass, weeds      Immunizations:     Immunization History   Administered Date(s) Administered    INFLUENZA 11/03/2016, 09/15/2017, 12/04/2019    Influenza, high dose seasonal 0 5 mL 12/18/2018    Pneumococcal Conjugate 13-Valent 06/19/2018      Health Maintenance:         Topic Date Due    CRC Screening: Colonoscopy  09/12/2027    Hepatitis C Screening  Completed         Topic Date Due    DTaP,Tdap,and Td Vaccines (1 - Tdap) 11/16/1961    Pneumococcal Vaccine: 65+ Years (2 of 2 - PPSV23) 06/19/2019      Medicare Health Risk Assessment:     /62   Pulse 78   Temp (!) 97 1 °F (36 2 °C) (Tympanic)   Resp 16   Ht 5' 11" (1 803 m)   Wt 119 kg (262 lb)   BMI 36 54 kg/m²      Vidya Raygoza is here for his Subsequent Wellness visit  Last Medicare Wellness visit information reviewed, patient interviewed and updates made to the record today  Health Risk Assessment:   Patient rates overall health as very good  Patient feels that their physical health rating is same  Eyesight was rated as slightly worse  Hearing was rated as same  Patient feels that their emotional and mental health rating is same  Pain experienced in the last 7 days has been none  Patient states that he has experienced no weight loss or gain in last 6 months  Fall Risk Screening: In the past year, patient has experienced: no history of falling in past year      Home Safety:  Patient does not have trouble with stairs inside or outside of their home  Patient has working smoke alarms and has working carbon monoxide detector  Home safety hazards include: none  Nutrition:   Current diet is Low Carb, No Added Salt and Limited junk food  Medications:   Patient is currently taking over-the-counter supplements  OTC medications include: see medication list  Patient is able to manage medications  Activities of Daily Living (ADLs)/Instrumental Activities of Daily Living (IADLs):   Walk and transfer into and out of bed and chair?: Yes  Dress and groom yourself?: Yes    Bathe or shower yourself?: Yes    Feed yourself?  Yes  Do your laundry/housekeeping?: Yes  Manage your money, pay your bills and track your expenses?: Yes  Make your own meals?: Yes    Do your own shopping?: Yes    Previous Hospitalizations:   Any hospitalizations or ED visits within the last 12 months?: No      Advance Care Planning:   Living will: Yes    Durable POA for healthcare:  Yes    Advanced directive: Yes    Advanced directive counseling given: No    Five wishes given: No    Patient declined ACP directive: No    End of Life Decisions reviewed with patient: Yes      Cognitive Screening:   Provider or family/friend/caregiver concerned regarding cognition?: No    PREVENTIVE SCREENINGS      Cardiovascular Screening:    General: Screening Not Indicated and History Lipid Disorder      Diabetes Screening:     General: Screening Current      Colorectal Cancer Screening:     General: Screening Current      Prostate Cancer Screening:      Due for: PSA      Osteoporosis Screening:    General: Screening Not Indicated      Abdominal Aortic Aneurysm (AAA) Screening:    Risk factors include: age between 73-67 yo and tobacco use        Hepatitis C Screening:    General: Screening Current      Thelma Dick PA-C

## 2020-01-30 NOTE — PROGRESS NOTES
Assessment/Plan:  Problem List Items Addressed This Visit        Cardiovascular and Mediastinum    Hypertension - Primary     Pts symptoms are stable with current regime  No changes at present  Other    Elevated fasting glucose     A1Cs have been normal  Will check annually  Due for complete labs at next visit  Post-nasal drip     Start claritin daily  Call if symptoms fail to improve  Other Visit Diagnoses     Elevated glucose        Relevant Orders    POCT hemoglobin A1c (Completed)           Diagnoses and all orders for this visit:    Essential hypertension    Elevated glucose  -     POCT hemoglobin A1c    Post-nasal drip    Elevated fasting glucose        Hypertension  Pts symptoms are stable with current regime  No changes at present  Post-nasal drip  Start claritin daily  Call if symptoms fail to improve  Elevated fasting glucose  A1Cs have been normal  Will check annually  Due for complete labs at next visit  Subjective:      Patient ID: Amanuel Knox is a 68 y o  male  Pt presents for routine visit  He is doing well overall  He was getting A1Cs drawn due to have borderline elevated glucose  A1C today is 5 8  Subsequent AWV also performed today  His BP remains well controlled  He complains of some cold symptoms, mostly nasal congestion and post nasal drip for weeks  The following portions of the patient's history were reviewed and updated as appropriate:   He has a past medical history of Arthritis, Hyperlipidemia, Hypertension, and Obesity  ,  does not have any pertinent problems on file  ,   has a past surgical history that includes Knee surgery; Hand surgery (Bilateral); Wrist surgery; Elbow surgery; Colonoscopy; and Cardiac surgery  ,  family history includes Diabetes in his sister; Heart disease in his father and paternal grandmother; Kidney disease in his sister; Lupus in his mother; Rheum arthritis in his mother  ,   reports that he has quit smoking  He has never used smokeless tobacco  He reports that he drinks alcohol  He reports that he does not use drugs  ,  is allergic to other     Current Outpatient Medications   Medication Sig Dispense Refill    aspirin 81 MG tablet Take 81 mg by mouth daily      diphenhydrAMINE (BENADRYL) 25 mg tablet Take 25 mg by mouth daily at bedtime as needed for itching      ergocalciferol (VITAMIN D2) 50,000 units Take 1 capsule (50,000 Units total) by mouth once a week 4 capsule 3    lisinopril-hydrochlorothiazide (PRINZIDE,ZESTORETIC) 20-25 MG per tablet Take 1 tablet by mouth daily 90 tablet 5    metoprolol tartrate (LOPRESSOR) 25 mg tablet Take 1 tablet (25 mg total) by mouth every 12 (twelve) hours (Patient taking differently: Take 25 mg by mouth daily ) 60 tablet 5    pravastatin (PRAVACHOL) 40 mg tablet Take 1 tablet (40 mg total) by mouth daily 90 tablet 3     No current facility-administered medications for this visit  Review of Systems   Constitutional: Negative for chills and fever  HENT: Positive for postnasal drip and rhinorrhea  Negative for congestion, ear pain, hearing loss, sinus pressure, sinus pain, sore throat and trouble swallowing  Eyes: Negative for pain and visual disturbance  Respiratory: Negative for cough, chest tightness, shortness of breath and wheezing  Cardiovascular: Negative  Negative for chest pain, palpitations and leg swelling  Gastrointestinal: Negative for abdominal pain, blood in stool, constipation, diarrhea, nausea and vomiting  Endocrine: Negative for cold intolerance, heat intolerance, polydipsia, polyphagia and polyuria  Genitourinary: Negative for difficulty urinating, dysuria, flank pain and urgency  Musculoskeletal: Negative for arthralgias, back pain, gait problem and myalgias  Skin: Negative for rash  Allergic/Immunologic: Negative  Neurological: Negative for dizziness, weakness, light-headedness and headaches  Hematological: Negative  Psychiatric/Behavioral: Negative for behavioral problems, dysphoric mood and sleep disturbance  The patient is not nervous/anxious  PHQ-9 Depression Screening    PHQ-9:    Frequency of the following problems over the past two weeks:               Objective:  Vitals:    01/30/20 0802   BP: 126/62   Pulse: 78   Resp: 16   Temp: (!) 97 1 °F (36 2 °C)   TempSrc: Tympanic   Weight: 119 kg (262 lb)   Height: 5' 11" (1 803 m)     Body mass index is 36 54 kg/m²  Physical Exam   Constitutional: He is oriented to person, place, and time  He appears well-developed and well-nourished  No distress  HENT:   Head: Normocephalic and atraumatic  Right Ear: External ear normal    Left Ear: External ear normal    Nose: Mucosal edema and rhinorrhea present  Mouth/Throat: Oropharynx is clear and moist  No oropharyngeal exudate  Eyes: Pupils are equal, round, and reactive to light  Conjunctivae and EOM are normal  Right eye exhibits no discharge  Left eye exhibits no discharge  No scleral icterus  Neck: Normal range of motion  Neck supple  No thyromegaly present  Cardiovascular: Normal rate, regular rhythm and normal heart sounds  Exam reveals no gallop and no friction rub  No murmur heard  Pulmonary/Chest: Effort normal and breath sounds normal  No respiratory distress  He has no wheezes  He has no rales  Abdominal: Soft  Bowel sounds are normal  He exhibits no distension  There is no tenderness  Musculoskeletal: Normal range of motion  He exhibits no edema, tenderness or deformity  Neurological: He is alert and oriented to person, place, and time  No cranial nerve deficit  Skin: Skin is warm and dry  He is not diaphoretic  Psychiatric: He has a normal mood and affect  His behavior is normal  Judgment and thought content normal      BMI Counseling: Body mass index is 36 54 kg/m²   The BMI is above normal  Nutrition recommendations include decreasing portion sizes, consuming healthier snacks and limiting drinks that contain sugar

## 2020-03-09 ENCOUNTER — TELEPHONE (OUTPATIENT)
Dept: INTERNAL MEDICINE CLINIC | Facility: CLINIC | Age: 70
End: 2020-03-09

## 2020-03-09 ENCOUNTER — HOSPITAL ENCOUNTER (EMERGENCY)
Facility: HOSPITAL | Age: 70
Discharge: HOME | End: 2020-03-09
Attending: EMERGENCY MEDICINE | Admitting: EMERGENCY MEDICINE
Payer: MEDICARE

## 2020-03-09 VITALS
BODY MASS INDEX: 35.7 KG/M2 | HEIGHT: 71 IN | WEIGHT: 255 LBS | SYSTOLIC BLOOD PRESSURE: 126 MMHG | OXYGEN SATURATION: 94 % | HEART RATE: 90 BPM | TEMPERATURE: 99 F | RESPIRATION RATE: 16 BRPM | DIASTOLIC BLOOD PRESSURE: 72 MMHG

## 2020-03-09 DIAGNOSIS — R60.0 EDEMA OF RIGHT LOWER EXTREMITY: ICD-10-CM

## 2020-03-09 DIAGNOSIS — L03.90 CELLULITIS: Primary | ICD-10-CM

## 2020-03-09 LAB
ANION GAP SERPL CALCULATED.3IONS-SCNC: 5 MMOL/L (ref 4–13)
BASOPHILS # BLD AUTO: 0.1 THOUSANDS/ΜL (ref 0–0.1)
BASOPHILS NFR BLD AUTO: 1 % (ref 0–2)
BUN SERPL-MCNC: 16 MG/DL (ref 7–25)
CALCIUM SERPL-MCNC: 9.1 MG/DL (ref 8.6–10.5)
CHLORIDE SERPL-SCNC: 99 MMOL/L (ref 98–107)
CO2 SERPL-SCNC: 29 MMOL/L (ref 21–31)
CREAT SERPL-MCNC: 0.92 MG/DL (ref 0.7–1.3)
EOSINOPHIL # BLD AUTO: 0.2 THOUSAND/ΜL (ref 0–0.61)
EOSINOPHIL NFR BLD AUTO: 2 % (ref 0–5)
ERYTHROCYTE [DISTWIDTH] IN BLOOD BY AUTOMATED COUNT: 13.8 % (ref 11.5–14.5)
ERYTHROCYTE [SEDIMENTATION RATE] IN BLOOD: 32 MM/HOUR (ref 0–20)
GFR SERPL CREATININE-BSD FRML MDRD: 83 ML/MIN/1.73SQ M
GLUCOSE SERPL-MCNC: 121 MG/DL (ref 65–99)
HCT VFR BLD AUTO: 43.4 % (ref 42–47)
HGB BLD-MCNC: 14.4 G/DL (ref 14–18)
LYMPHOCYTES # BLD AUTO: 2.2 THOUSANDS/ΜL (ref 0.6–4.47)
LYMPHOCYTES NFR BLD AUTO: 20 % (ref 21–51)
MCH RBC QN AUTO: 31.2 PG (ref 26–34)
MCHC RBC AUTO-ENTMCNC: 33.3 G/DL (ref 31–37)
MCV RBC AUTO: 94 FL (ref 81–99)
MONOCYTES # BLD AUTO: 1.2 THOUSAND/ΜL (ref 0.17–1.22)
MONOCYTES NFR BLD AUTO: 11 % (ref 2–12)
NEUTROPHILS # BLD AUTO: 7.4 THOUSANDS/ΜL (ref 1.4–6.5)
NEUTS SEG NFR BLD AUTO: 67 % (ref 42–75)
PLATELET # BLD AUTO: 207 THOUSANDS/UL (ref 149–390)
PMV BLD AUTO: 9.7 FL (ref 8.6–11.7)
POTASSIUM SERPL-SCNC: 4.7 MMOL/L (ref 3.5–5.5)
RBC # BLD AUTO: 4.63 MILLION/UL (ref 4.3–5.9)
SODIUM SERPL-SCNC: 133 MMOL/L (ref 134–143)
WBC # BLD AUTO: 11.1 THOUSAND/UL (ref 4.8–10.8)

## 2020-03-09 PROCEDURE — 99283 EMERGENCY DEPT VISIT LOW MDM: CPT

## 2020-03-09 PROCEDURE — 99284 EMERGENCY DEPT VISIT MOD MDM: CPT | Performed by: PHYSICIAN ASSISTANT

## 2020-03-09 PROCEDURE — 85652 RBC SED RATE AUTOMATED: CPT | Performed by: PHYSICIAN ASSISTANT

## 2020-03-09 PROCEDURE — 36415 COLL VENOUS BLD VENIPUNCTURE: CPT | Performed by: PHYSICIAN ASSISTANT

## 2020-03-09 PROCEDURE — 85025 COMPLETE CBC W/AUTO DIFF WBC: CPT | Performed by: PHYSICIAN ASSISTANT

## 2020-03-09 PROCEDURE — 96372 THER/PROPH/DIAG INJ SC/IM: CPT

## 2020-03-09 PROCEDURE — 80048 BASIC METABOLIC PNL TOTAL CA: CPT | Performed by: PHYSICIAN ASSISTANT

## 2020-03-09 RX ORDER — CEPHALEXIN 500 MG/1
500 CAPSULE ORAL EVERY 6 HOURS SCHEDULED
Qty: 28 CAPSULE | Refills: 0 | Status: SHIPPED | OUTPATIENT
Start: 2020-03-09 | End: 2020-03-16

## 2020-03-09 RX ORDER — SULFAMETHOXAZOLE AND TRIMETHOPRIM 800; 160 MG/1; MG/1
1 TABLET ORAL 2 TIMES DAILY
Qty: 14 TABLET | Refills: 0 | Status: SHIPPED | OUTPATIENT
Start: 2020-03-09 | End: 2020-03-16

## 2020-03-09 RX ADMIN — LIDOCAINE HYDROCHLORIDE 1000 MG: 10 INJECTION, SOLUTION EPIDURAL; INFILTRATION; INTRACAUDAL; PERINEURAL at 13:41

## 2020-03-09 NOTE — DISCHARGE INSTRUCTIONS
Return to the emergency department if the area of cellulitis extends beyond the marked area on your knee or you develop fevers chills weakness fatigue  Follow-up with your PCP regarding her right lower extremity edema

## 2020-03-10 NOTE — ED PROVIDER NOTES
History  Chief Complaint   Patient presents with    Abscess     left upper leg (above knee) for past 3 days-increased rednes, swelling and tenderness     75-year-old male presents emergency department complaining of area of redness and swelling to the medial aspect of the right knee has been worsening over the last 3 days     He states he may have had a bug bite there that he then scratched it and it is since turned red and swollen  He is ambulatory without issue states that it is uncomfortable when the skin stretches when bending  The area of redness as well circumscribed in approximately the size of a half-dollar  There is an area of warmth that extends beyond the area of redness  The patient reports that the area is not particularly tender when palpated  He denies fever chills chest pain shortness of breath abdominal pain nausea vomiting diarrhea constipation  Allergies reviewed          Prior to Admission Medications   Prescriptions Last Dose Informant Patient Reported?  Taking?   aspirin 81 MG tablet 3/9/2020 at Unknown time  Yes Yes   Sig: Take 81 mg by mouth daily   diphenhydrAMINE (BENADRYL) 25 mg tablet 3/8/2020 at Unknown time  Yes Yes   Sig: Take 25 mg by mouth daily at bedtime as needed for itching   ergocalciferol (VITAMIN D2) 50,000 units 3/7/2020  No No   Sig: Take 1 capsule (50,000 Units total) by mouth once a week   lisinopril-hydrochlorothiazide (PRINZIDE,ZESTORETIC) 20-25 MG per tablet 3/9/2020 at Unknown time  No Yes   Sig: Take 1 tablet by mouth daily   metoprolol tartrate (LOPRESSOR) 25 mg tablet 3/9/2020 at Unknown time  No Yes   Sig: Take 1 tablet (25 mg total) by mouth every 12 (twelve) hours   Patient taking differently: Take 25 mg by mouth daily    pravastatin (PRAVACHOL) 40 mg tablet 3/9/2020 at Unknown time  No Yes   Sig: Take 1 tablet (40 mg total) by mouth daily      Facility-Administered Medications: None       Past Medical History:   Diagnosis Date    Arthritis     Hyperlipidemia     Hypertension     Obesity        Past Surgical History:   Procedure Laterality Date    CARDIAC SURGERY      heart catherization    COLONOSCOPY      ELBOW SURGERY      HAND SURGERY Bilateral     KNEE SURGERY      WRIST SURGERY         Family History   Problem Relation Age of Onset    Rheum arthritis Mother     Lupus Mother     Heart disease Father     Diabetes Sister     Kidney disease Sister     Heart disease Paternal Grandmother      I have reviewed and agree with the history as documented  E-Cigarette/Vaping    E-Cigarette Use Never User      E-Cigarette/Vaping Substances     Social History     Tobacco Use    Smoking status: Former Smoker    Smokeless tobacco: Never Used   Substance Use Topics    Alcohol use: Yes     Frequency: 2-3 times a week     Drinks per session: 3 or 4     Comment: SOCIAL    Drug use: No       Review of Systems   Musculoskeletal: Positive for arthralgias  All other systems reviewed and are negative  Physical Exam  Physical Exam   Constitutional: He is oriented to person, place, and time  Vital signs are normal  He appears well-developed and well-nourished  He is cooperative  He does not appear ill  No distress  HENT:   Head: Normocephalic and atraumatic  Right Ear: External ear normal    Left Ear: External ear normal    Nose: Nose normal    Mouth/Throat: Oropharynx is clear and moist    Eyes: Pupils are equal, round, and reactive to light  EOM are normal    Neck: Normal range of motion  Neck supple  Cardiovascular: Normal rate, regular rhythm, normal heart sounds and intact distal pulses  Exam reveals no gallop and no friction rub  No murmur heard  Edema of right lower extremity +2  Pulmonary/Chest: Effort normal and breath sounds normal  No stridor  No respiratory distress  He has no wheezes  He has no rales  Abdominal: Soft  Bowel sounds are normal  He exhibits no distension  There is no tenderness  There is no guarding  Musculoskeletal: Normal range of motion  Right knee: Tenderness found  Legs:  No bony tenderness  Tenderness over area of erythema  Neurological: He is alert and oriented to person, place, and time  Skin: Skin is warm  Capillary refill takes less than 2 seconds  Nursing note and vitals reviewed        Vital Signs  ED Triage Vitals [03/09/20 1152]   Temperature Pulse Respirations Blood Pressure SpO2   99 °F (37 2 °C) 90 16 126/72 94 %      Temp Source Heart Rate Source Patient Position - Orthostatic VS BP Location FiO2 (%)   Temporal Monitor Sitting Left arm --      Pain Score       4           Vitals:    03/09/20 1152 03/09/20 1200   BP: 126/72 126/72   Pulse: 90    Patient Position - Orthostatic VS: Sitting          Visual Acuity      ED Medications  Medications   cefTRIAXone (ROCEPHIN) 1,000 mg in lidocaine (PF) (XYLOCAINE-MPF) 1 % IM only syringe (1,000 mg Intramuscular Given 3/9/20 1341)       Diagnostic Studies  Results Reviewed     Procedure Component Value Units Date/Time    Sedimentation rate, automated [[id number]]  (Abnormal) Collected:  03/09/20 1315    Lab Status:  Final result Specimen:  Blood from Arm, Left Updated:  03/09/20 1402     Sed Rate 32 mm/hour     Basic metabolic panel [[id number]]  (Abnormal) Collected:  03/09/20 1315    Lab Status:  Final result Specimen:  Blood from Hand, Left Updated:  03/09/20 1354     Sodium 133 mmol/L      Potassium 4 7 mmol/L      Chloride 99 mmol/L      CO2 29 mmol/L      ANION GAP 5 mmol/L      BUN 16 mg/dL      Creatinine 0 92 mg/dL      Glucose 121 mg/dL      Calcium 9 1 mg/dL      eGFR 83 ml/min/1 73sq m     Narrative:       [place] guidelines for Chronic Kidney Disease (CKD):     Stage 1 with normal or high GFR (GFR > 90 mL/min/1 73 square meters)    Stage 2 Mild CKD (GFR = 60-89 mL/min/1 73 square meters)    Stage 3A Moderate CKD (GFR = 45-59 mL/min/1 73 square meters)    Stage 3B Moderate CKD (GFR = 30-44 mL/min/1 73 square meters)    Stage 4 Severe CKD (GFR = 15-29 mL/min/1 73 square meters)    Stage 5 End Stage CKD (GFR <15 mL/min/1 73 square meters)  Note: GFR calculation is accurate only with a steady state creatinine    CBC and differential [[id number]]  (Abnormal) Collected:  03/09/20 1315    Lab Status:  Final result Specimen:  Blood from Hand, Left Updated:  03/09/20 1323     WBC 11 10 Thousand/uL      RBC 4 63 Million/uL      Hemoglobin 14 4 g/dL      Hematocrit 43 4 %      MCV 94 fL      MCH 31 2 pg      MCHC 33 3 g/dL      RDW 13 8 %      MPV 9 7 fL      Platelets 015 Thousands/uL      Neutrophils Relative 67 %      Lymphocytes Relative 20 %      Monocytes Relative 11 %      Eosinophils Relative 2 %      Basophils Relative 1 %      Neutrophils Absolute 7 40 Thousands/µL      Lymphocytes Absolute 2 20 Thousands/µL      Monocytes Absolute 1 20 Thousand/µL      Eosinophils Absolute 0 20 Thousand/µL      Basophils Absolute 0 10 Thousands/µL                  No orders to display              Procedures  Procedures         ED Course                               MDM  Number of Diagnoses or Management Options  Cellulitis: new and requires workup  Edema of right lower extremity: minor  Diagnosis management comments: Labs evaluated  Elevated CRP  The patient likely has cellulitis  Low concern for necrotizing skin and soft tissue infection at this time  No elevated white cell count  The area of cellulitis was marked with a pen  The patient was advised that if the area of erythema extends beyond the borders of the marking he is to return to the emergency department  Patient given shot of 1 g of IM Rocephin in the ER and given a prescription for Keflex and Bactrim to go home with  Incidentally it is noted that the patient has pronounced edema of the right lower extremity that is +2 pitting when compared to the left    The patient reports that he has had increased salt intake lately that usually his swelling goes away when he stops eating as much salt  Patient denies any complaints relating to this swelling however I advised him to follow-up with his family physician for further evaluation  Patient has no cardiac or pulmonary symptoms  Patient educated regarding their diagnosis and given return and follow-up instructions  Patient is understanding and in agreement with the treatment plan  There are no questions at the time of discharge  Amount and/or Complexity of Data Reviewed  Clinical lab tests: ordered and reviewed  Tests in the radiology section of CPT®: ordered and reviewed  Discuss the patient with other providers: yes ([place])  Independent visualization of images, tracings, or specimens: yes    Risk of Complications, Morbidity, and/or Mortality  Presenting problems: moderate  Diagnostic procedures: low  Management options: low    Patient Progress  Patient progress: stable        Disposition  Final diagnoses:   Cellulitis   Edema of right lower extremity     Time reflects when diagnosis was documented in both MDM as applicable and the Disposition within this note     Time User Action Codes Description Comment    3/9/2020  2:07 PM Frances Newton Add [L03 90] Cellulitis     3/9/2020  2:07 PM Frances Newton Add [R60 0] Edema of right lower extremity       ED Disposition     ED Disposition Condition Date/Time Comment    Discharge Stable Mon Mar 9, 2020  2:06 PM Ximena Odonnell discharge to home/self care              Follow-up Information     Follow up With Specialties Details Why Contact Info Additional Information    Louis Zarate PA-C Family Medicine, Internal Medicine, Physician Assistant   [address] [facility] [address] [place] [address]       [address] Erica cao Orthopedic Surgery   [address] [facility] [address] Erica cao, [facility], [address]   [phone]          Discharge Medication List as of 3/9/2020  2:10 PM      START taking these medications    Details   cephalexin (KEFLEX) 500 mg capsule Take 1 capsule (500 mg total) by mouth every 6 (six) hours for 7 days, Starting Mon 3/9/2020, Until Mon 3/16/2020, Normal      sulfamethoxazole-trimethoprim (BACTRIM DS) 800-160 mg per tablet Take 1 tablet by mouth 2 (two) times a day for 7 days smx-tmp DS (BACTRIM) 800-160 mg tabs (1tab q12 D10), Starting Mon 3/9/2020, Until Mon 3/16/2020, Normal         CONTINUE these medications which have NOT CHANGED    Details   aspirin 81 MG tablet Take 81 mg by mouth daily, Historical Med      diphenhydrAMINE (BENADRYL) 25 mg tablet Take 25 mg by mouth daily at bedtime as needed for itching, Historical Med      lisinopril-hydrochlorothiazide (PRINZIDE,ZESTORETIC) 20-25 MG per tablet Take 1 tablet by mouth daily, Starting Thu 4/18/2019, Normal      metoprolol tartrate (LOPRESSOR) 25 mg tablet Take 1 tablet (25 mg total) by mouth every 12 (twelve) hours, Starting Thu 4/18/2019, Normal      pravastatin (PRAVACHOL) 40 mg tablet Take 1 tablet (40 mg total) by mouth daily, Starting Tue 8/20/2019, Normal      ergocalciferol (VITAMIN D2) 50,000 units Take 1 capsule (50,000 Units total) by mouth once a week, Starting Thu 9/26/2019, Normal           No discharge procedures on file      PDMP Review     None          ED Provider  Electronically Signed by           Kalina Soares PA-C  03/09/20 2024

## 2020-04-28 DIAGNOSIS — R94.39 ABNORMAL STRESS ECHO: ICD-10-CM

## 2020-04-28 DIAGNOSIS — I10 HYPERTENSION, UNSPECIFIED TYPE: ICD-10-CM

## 2020-04-28 DIAGNOSIS — R06.00 DYSPNEA ON EXERTION: ICD-10-CM

## 2020-04-28 DIAGNOSIS — I10 ESSENTIAL HYPERTENSION: ICD-10-CM

## 2020-04-28 RX ORDER — LISINOPRIL AND HYDROCHLOROTHIAZIDE 25; 20 MG/1; MG/1
TABLET ORAL
Qty: 90 TABLET | Refills: 1 | Status: SHIPPED | OUTPATIENT
Start: 2020-04-28 | End: 2020-11-04 | Stop reason: SDUPTHER

## 2020-08-10 ENCOUNTER — APPOINTMENT (OUTPATIENT)
Dept: LAB | Facility: CLINIC | Age: 70
End: 2020-08-10
Payer: MEDICARE

## 2020-08-10 ENCOUNTER — OFFICE VISIT (OUTPATIENT)
Dept: INTERNAL MEDICINE CLINIC | Facility: CLINIC | Age: 70
End: 2020-08-10
Payer: MEDICARE

## 2020-08-10 VITALS
TEMPERATURE: 98.3 F | DIASTOLIC BLOOD PRESSURE: 82 MMHG | HEIGHT: 71 IN | BODY MASS INDEX: 36.82 KG/M2 | HEART RATE: 85 BPM | SYSTOLIC BLOOD PRESSURE: 132 MMHG | WEIGHT: 263 LBS | OXYGEN SATURATION: 92 %

## 2020-08-10 DIAGNOSIS — I10 ESSENTIAL HYPERTENSION: ICD-10-CM

## 2020-08-10 DIAGNOSIS — E78.2 MIXED HYPERLIPIDEMIA: ICD-10-CM

## 2020-08-10 DIAGNOSIS — Z13.29 SCREENING FOR THYROID DISORDER: ICD-10-CM

## 2020-08-10 DIAGNOSIS — E55.9 VITAMIN D DEFICIENCY: ICD-10-CM

## 2020-08-10 DIAGNOSIS — R73.01 ELEVATED FASTING GLUCOSE: ICD-10-CM

## 2020-08-10 DIAGNOSIS — Z13.0 SCREENING FOR DEFICIENCY ANEMIA: ICD-10-CM

## 2020-08-10 DIAGNOSIS — I10 ESSENTIAL HYPERTENSION: Primary | ICD-10-CM

## 2020-08-10 DIAGNOSIS — Z12.5 SCREENING PSA (PROSTATE SPECIFIC ANTIGEN): ICD-10-CM

## 2020-08-10 LAB
25(OH)D3 SERPL-MCNC: 27.7 NG/ML (ref 30–100)
ALBUMIN SERPL BCP-MCNC: 3.6 G/DL (ref 3.5–5)
ALP SERPL-CCNC: 44 U/L (ref 46–116)
ALT SERPL W P-5'-P-CCNC: 47 U/L (ref 12–78)
ANION GAP SERPL CALCULATED.3IONS-SCNC: 7 MMOL/L (ref 4–13)
AST SERPL W P-5'-P-CCNC: 26 U/L (ref 5–45)
BASOPHILS # BLD AUTO: 0.05 THOUSANDS/ΜL (ref 0–0.1)
BASOPHILS NFR BLD AUTO: 1 % (ref 0–1)
BILIRUB SERPL-MCNC: 0.88 MG/DL (ref 0.2–1)
BUN SERPL-MCNC: 20 MG/DL (ref 5–25)
CALCIUM SERPL-MCNC: 8.9 MG/DL (ref 8.3–10.1)
CHLORIDE SERPL-SCNC: 102 MMOL/L (ref 100–108)
CHOLEST SERPL-MCNC: 146 MG/DL (ref 50–200)
CO2 SERPL-SCNC: 28 MMOL/L (ref 21–32)
CREAT SERPL-MCNC: 1.1 MG/DL (ref 0.6–1.3)
CREAT UR-MCNC: 73.1 MG/DL
EOSINOPHIL # BLD AUTO: 0.13 THOUSAND/ΜL (ref 0–0.61)
EOSINOPHIL NFR BLD AUTO: 2 % (ref 0–6)
ERYTHROCYTE [DISTWIDTH] IN BLOOD BY AUTOMATED COUNT: 13.8 % (ref 11.6–15.1)
EST. AVERAGE GLUCOSE BLD GHB EST-MCNC: 134 MG/DL
GFR SERPL CREATININE-BSD FRML MDRD: 67 ML/MIN/1.73SQ M
GLUCOSE P FAST SERPL-MCNC: 124 MG/DL (ref 65–99)
HBA1C MFR BLD: 6.3 %
HCT VFR BLD AUTO: 46 % (ref 36.5–49.3)
HDLC SERPL-MCNC: 41 MG/DL
HGB BLD-MCNC: 14.8 G/DL (ref 12–17)
IMM GRANULOCYTES # BLD AUTO: 0.04 THOUSAND/UL (ref 0–0.2)
IMM GRANULOCYTES NFR BLD AUTO: 1 % (ref 0–2)
LDLC SERPL CALC-MCNC: 82 MG/DL (ref 0–100)
LYMPHOCYTES # BLD AUTO: 1.98 THOUSANDS/ΜL (ref 0.6–4.47)
LYMPHOCYTES NFR BLD AUTO: 24 % (ref 14–44)
MCH RBC QN AUTO: 31 PG (ref 26.8–34.3)
MCHC RBC AUTO-ENTMCNC: 32.2 G/DL (ref 31.4–37.4)
MCV RBC AUTO: 96 FL (ref 82–98)
MICROALBUMIN UR-MCNC: 6.9 MG/L (ref 0–20)
MICROALBUMIN/CREAT 24H UR: 9 MG/G CREATININE (ref 0–30)
MONOCYTES # BLD AUTO: 0.84 THOUSAND/ΜL (ref 0.17–1.22)
MONOCYTES NFR BLD AUTO: 10 % (ref 4–12)
NEUTROPHILS # BLD AUTO: 5.07 THOUSANDS/ΜL (ref 1.85–7.62)
NEUTS SEG NFR BLD AUTO: 62 % (ref 43–75)
NONHDLC SERPL-MCNC: 105 MG/DL
NRBC BLD AUTO-RTO: 0 /100 WBCS
PLATELET # BLD AUTO: 221 THOUSANDS/UL (ref 149–390)
PMV BLD AUTO: 12.6 FL (ref 8.9–12.7)
POTASSIUM SERPL-SCNC: 3.9 MMOL/L (ref 3.5–5.3)
PROT SERPL-MCNC: 7.5 G/DL (ref 6.4–8.2)
PSA SERPL-MCNC: 1 NG/ML (ref 0–4)
RBC # BLD AUTO: 4.78 MILLION/UL (ref 3.88–5.62)
SODIUM SERPL-SCNC: 137 MMOL/L (ref 136–145)
TRIGL SERPL-MCNC: 114 MG/DL
TSH SERPL DL<=0.05 MIU/L-ACNC: 0.7 UIU/ML (ref 0.36–3.74)
WBC # BLD AUTO: 8.11 THOUSAND/UL (ref 4.31–10.16)

## 2020-08-10 PROCEDURE — 83036 HEMOGLOBIN GLYCOSYLATED A1C: CPT

## 2020-08-10 PROCEDURE — 3008F BODY MASS INDEX DOCD: CPT | Performed by: PHYSICIAN ASSISTANT

## 2020-08-10 PROCEDURE — 4040F PNEUMOC VAC/ADMIN/RCVD: CPT | Performed by: PHYSICIAN ASSISTANT

## 2020-08-10 PROCEDURE — 3079F DIAST BP 80-89 MM HG: CPT | Performed by: PHYSICIAN ASSISTANT

## 2020-08-10 PROCEDURE — 82043 UR ALBUMIN QUANTITATIVE: CPT | Performed by: PHYSICIAN ASSISTANT

## 2020-08-10 PROCEDURE — 80061 LIPID PANEL: CPT

## 2020-08-10 PROCEDURE — 82306 VITAMIN D 25 HYDROXY: CPT

## 2020-08-10 PROCEDURE — 99214 OFFICE O/P EST MOD 30 MIN: CPT | Performed by: PHYSICIAN ASSISTANT

## 2020-08-10 PROCEDURE — 1036F TOBACCO NON-USER: CPT | Performed by: PHYSICIAN ASSISTANT

## 2020-08-10 PROCEDURE — 80053 COMPREHEN METABOLIC PANEL: CPT

## 2020-08-10 PROCEDURE — 82570 ASSAY OF URINE CREATININE: CPT | Performed by: PHYSICIAN ASSISTANT

## 2020-08-10 PROCEDURE — 36415 COLL VENOUS BLD VENIPUNCTURE: CPT

## 2020-08-10 PROCEDURE — 84443 ASSAY THYROID STIM HORMONE: CPT

## 2020-08-10 PROCEDURE — 3075F SYST BP GE 130 - 139MM HG: CPT | Performed by: PHYSICIAN ASSISTANT

## 2020-08-10 PROCEDURE — G0103 PSA SCREENING: HCPCS

## 2020-08-10 PROCEDURE — 85025 COMPLETE CBC W/AUTO DIFF WBC: CPT

## 2020-08-10 PROCEDURE — 1160F RVW MEDS BY RX/DR IN RCRD: CPT | Performed by: PHYSICIAN ASSISTANT

## 2020-08-10 RX ORDER — ERGOCALCIFEROL 1.25 MG/1
50000 CAPSULE ORAL WEEKLY
Qty: 4 CAPSULE | Refills: 3 | Status: SHIPPED | OUTPATIENT
Start: 2020-08-10 | End: 2022-01-18

## 2020-08-10 NOTE — ASSESSMENT & PLAN NOTE
Pts symptoms are stable with current regime  No changes at present  Watch salt in diet to help decrease mild swelling of RLE  Update labs and adjust treatment accordingly

## 2020-08-10 NOTE — PROGRESS NOTES
Assessment/Plan:  Problem List Items Addressed This Visit        Cardiovascular and Mediastinum    Hypertension - Primary     Pts symptoms are stable with current regime  No changes at present  Watch salt in diet to help decrease mild swelling of RLE  Update labs and adjust treatment accordingly  Relevant Orders    Comprehensive metabolic panel       Other    Hyperlipidemia    Relevant Orders    Lipid panel    Elevated fasting glucose    Relevant Orders    Hemoglobin A1C    Microalbumin / creatinine urine ratio      Other Visit Diagnoses     Vitamin D deficiency        Relevant Medications    ergocalciferol (VITAMIN D2) 50,000 units    Other Relevant Orders    Vitamin D 25 hydroxy    Screening PSA (prostate specific antigen)        Relevant Orders    PSA, Total Screen    Screening for thyroid disorder        Relevant Orders    TSH, 3rd generation with Free T4 reflex    Screening for deficiency anemia        Relevant Orders    CBC and differential           Diagnoses and all orders for this visit:    Essential hypertension  -     Comprehensive metabolic panel; Future    Vitamin D deficiency  -     ergocalciferol (VITAMIN D2) 50,000 units; Take 1 capsule (50,000 Units total) by mouth once a week  -     Vitamin D 25 hydroxy; Future    Mixed hyperlipidemia  -     Lipid panel; Future    Screening PSA (prostate specific antigen)  -     PSA, Total Screen; Future    Screening for thyroid disorder  -     TSH, 3rd generation with Free T4 reflex; Future    Screening for deficiency anemia  -     CBC and differential; Future    Elevated fasting glucose  -     Hemoglobin A1C; Future  -     Microalbumin / creatinine urine ratio        Hypertension  Pts symptoms are stable with current regime  No changes at present  Watch salt in diet to help decrease mild swelling of RLE  Update labs and adjust treatment accordingly  Subjective:      Patient ID: Clau Lowe is a 68 y o  male  Pt presents for routine visit  He is doing well overall  He is having some mild right ankle swelling  Denies redness, pain or warmth  No calf or posterior knee pain  BP is well controlled  Labs are due  CRC screening is up to date  The following portions of the patient's history were reviewed and updated as appropriate:   He has a past medical history of Arthritis, Hyperlipidemia, Hypertension, and Obesity  ,  does not have any pertinent problems on file  ,   has a past surgical history that includes Knee surgery; Hand surgery (Bilateral); Wrist surgery; Elbow surgery; Colonoscopy; and Cardiac surgery  ,  family history includes Diabetes in his sister; Heart disease in his father and paternal grandmother; Kidney disease in his sister; Lupus in his mother; Rheum arthritis in his mother  ,   reports that he has quit smoking  He has never used smokeless tobacco  He reports current alcohol use  He reports that he does not use drugs  ,  is allergic to other     Current Outpatient Medications   Medication Sig Dispense Refill    aspirin 81 MG tablet Take 81 mg by mouth daily      diphenhydrAMINE (BENADRYL) 25 mg tablet Take 25 mg by mouth daily at bedtime as needed for itching      lisinopril-hydrochlorothiazide (PRINZIDE,ZESTORETIC) 20-25 MG per tablet Take 1 tablet by mouth once daily 90 tablet 1    metoprolol tartrate (LOPRESSOR) 25 mg tablet TAKE 1 TABLET BY MOUTH EVERY 12 HOURS 180 tablet 1    pravastatin (PRAVACHOL) 40 mg tablet Take 1 tablet (40 mg total) by mouth daily 90 tablet 3    ergocalciferol (VITAMIN D2) 50,000 units Take 1 capsule (50,000 Units total) by mouth once a week 4 capsule 3     No current facility-administered medications for this visit  Review of Systems   Constitutional: Negative for chills and fever  HENT: Negative for congestion, ear pain, hearing loss, postnasal drip, rhinorrhea, sinus pressure, sinus pain, sore throat and trouble swallowing  Eyes: Negative for pain and visual disturbance     Respiratory: Negative for cough, chest tightness, shortness of breath and wheezing  Cardiovascular: Positive for leg swelling  Negative for chest pain and palpitations  Gastrointestinal: Negative for abdominal pain, blood in stool, constipation, diarrhea, nausea and vomiting  Endocrine: Negative for cold intolerance, heat intolerance, polydipsia, polyphagia and polyuria  Genitourinary: Negative for difficulty urinating, dysuria, flank pain and urgency  Musculoskeletal: Negative for arthralgias, back pain, gait problem and myalgias  Skin: Negative for rash  Allergic/Immunologic: Negative  Neurological: Negative for dizziness, weakness, light-headedness and headaches  Hematological: Negative  Psychiatric/Behavioral: Negative for behavioral problems, dysphoric mood and sleep disturbance  The patient is not nervous/anxious  PHQ-9 Depression Screening    PHQ-9:    Frequency of the following problems over the past two weeks:               Objective:  Vitals:    08/10/20 0816   BP: 132/82   Pulse: 85   Temp: 98 3 °F (36 8 °C)   SpO2: 92%   Weight: 119 kg (263 lb)   Height: 5' 11" (1 803 m)     Body mass index is 36 68 kg/m²  Physical Exam  Constitutional:       General: He is not in acute distress  Appearance: He is well-developed  He is not diaphoretic  HENT:      Head: Normocephalic and atraumatic  Right Ear: External ear normal       Left Ear: External ear normal       Nose: Nose normal       Mouth/Throat:      Pharynx: No oropharyngeal exudate  Eyes:      General: No scleral icterus  Right eye: No discharge  Left eye: No discharge  Conjunctiva/sclera: Conjunctivae normal       Pupils: Pupils are equal, round, and reactive to light  Neck:      Musculoskeletal: Normal range of motion and neck supple  Thyroid: No thyromegaly  Cardiovascular:      Rate and Rhythm: Normal rate and regular rhythm  Heart sounds: Normal heart sounds  No murmur   No friction rub  No gallop  Pulmonary:      Effort: Pulmonary effort is normal  No respiratory distress  Breath sounds: Normal breath sounds  No wheezing or rales  Abdominal:      General: Bowel sounds are normal  There is no distension  Palpations: Abdomen is soft  Tenderness: There is no abdominal tenderness  Musculoskeletal: Normal range of motion  General: No tenderness or deformity  Right ankle: He exhibits swelling  Skin:     General: Skin is warm and dry  Neurological:      Mental Status: He is alert and oriented to person, place, and time  Cranial Nerves: No cranial nerve deficit  Psychiatric:         Behavior: Behavior normal          Thought Content:  Thought content normal          Judgment: Judgment normal

## 2020-08-13 DIAGNOSIS — E78.5 HYPERLIPIDEMIA, UNSPECIFIED HYPERLIPIDEMIA TYPE: ICD-10-CM

## 2020-08-13 RX ORDER — PRAVASTATIN SODIUM 40 MG
TABLET ORAL
Qty: 90 TABLET | Refills: 1 | Status: SHIPPED | OUTPATIENT
Start: 2020-08-13 | End: 2021-02-22

## 2020-11-04 DIAGNOSIS — I10 HYPERTENSION, UNSPECIFIED TYPE: ICD-10-CM

## 2020-11-04 RX ORDER — LISINOPRIL AND HYDROCHLOROTHIAZIDE 25; 20 MG/1; MG/1
1 TABLET ORAL DAILY
Qty: 90 TABLET | Refills: 1 | Status: SHIPPED | OUTPATIENT
Start: 2020-11-04 | End: 2021-05-14 | Stop reason: SDUPTHER

## 2021-02-15 ENCOUNTER — OFFICE VISIT (OUTPATIENT)
Dept: INTERNAL MEDICINE CLINIC | Facility: CLINIC | Age: 71
End: 2021-02-15
Payer: MEDICARE

## 2021-02-15 VITALS
HEART RATE: 105 BPM | TEMPERATURE: 98.2 F | RESPIRATION RATE: 18 BRPM | OXYGEN SATURATION: 95 % | SYSTOLIC BLOOD PRESSURE: 128 MMHG | WEIGHT: 267 LBS | DIASTOLIC BLOOD PRESSURE: 72 MMHG | HEIGHT: 71 IN | BODY MASS INDEX: 37.38 KG/M2

## 2021-02-15 DIAGNOSIS — I10 ESSENTIAL HYPERTENSION: Primary | ICD-10-CM

## 2021-02-15 DIAGNOSIS — R73.01 ELEVATED FASTING GLUCOSE: ICD-10-CM

## 2021-02-15 DIAGNOSIS — Z00.00 MEDICARE ANNUAL WELLNESS VISIT, SUBSEQUENT: ICD-10-CM

## 2021-02-15 LAB — SL AMB POCT HEMOGLOBIN AIC: 6 (ref ?–6.5)

## 2021-02-15 PROCEDURE — G0439 PPPS, SUBSEQ VISIT: HCPCS | Performed by: PHYSICIAN ASSISTANT

## 2021-02-15 PROCEDURE — 99213 OFFICE O/P EST LOW 20 MIN: CPT | Performed by: PHYSICIAN ASSISTANT

## 2021-02-15 PROCEDURE — 83036 HEMOGLOBIN GLYCOSYLATED A1C: CPT | Performed by: PHYSICIAN ASSISTANT

## 2021-02-15 PROCEDURE — 1123F ACP DISCUSS/DSCN MKR DOCD: CPT | Performed by: PHYSICIAN ASSISTANT

## 2021-02-15 NOTE — PROGRESS NOTES
Assessment/Plan:  Problem List Items Addressed This Visit     None      Visit Diagnoses     Medicare annual wellness visit, subsequent    -  Primary           Diagnoses and all orders for this visit:    Medicare annual wellness visit, subsequent    Other orders  -     POCT hemoglobin A1c        No problem-specific Assessment & Plan notes found for this encounter  Subjective:      Patient ID: Gilma Morocho is a 65 y o  male  Pt presents for routine visit  He is doing well overall  He is due for A1C  Labs and CRC screening are up to date  BP is well controlled  Subsequent AWV also performed  A1C today is great at 6 0 without any diabetic medications  He relates he was drinking a lot of tomato juice and this caused some lower extremity swelling which resolved when he stopped drinking it  The following portions of the patient's history were reviewed and updated as appropriate:   He has a past medical history of Arthritis, Hyperlipidemia, Hypertension, and Obesity  ,  does not have any pertinent problems on file  ,   has a past surgical history that includes Knee surgery; Hand surgery (Bilateral); Wrist surgery; Elbow surgery; Colonoscopy; and Cardiac surgery  ,  family history includes Diabetes in his sister; Heart disease in his father and paternal grandmother; Kidney disease in his sister; Lupus in his mother; Rheum arthritis in his mother  ,   reports that he has quit smoking  His smoking use included cigarettes  He quit after 10 00 years of use  He has never used smokeless tobacco  He reports current alcohol use  He reports that he does not use drugs  ,  is allergic to other     Current Outpatient Medications   Medication Sig Dispense Refill    aspirin 81 MG tablet Take 81 mg by mouth daily      diphenhydrAMINE (BENADRYL) 25 mg tablet Take 25 mg by mouth daily at bedtime as needed for itching      ergocalciferol (VITAMIN D2) 50,000 units Take 1 capsule (50,000 Units total) by mouth once a week 4 capsule 3    lisinopril-hydrochlorothiazide (PRINZIDE,ZESTORETIC) 20-25 MG per tablet Take 1 tablet by mouth daily 90 tablet 1    pravastatin (PRAVACHOL) 40 mg tablet Take 1 tablet by mouth once daily 90 tablet 1    metoprolol tartrate (LOPRESSOR) 25 mg tablet TAKE 1 TABLET BY MOUTH EVERY 12 HOURS (Patient not taking: Reported on 2/15/2021) 180 tablet 1     No current facility-administered medications for this visit  Review of Systems   Constitutional: Negative for chills and fever  HENT: Negative for congestion, ear pain, hearing loss, postnasal drip, rhinorrhea, sinus pressure, sinus pain, sore throat and trouble swallowing  Eyes: Negative for pain and visual disturbance  Respiratory: Negative for cough, chest tightness, shortness of breath and wheezing  Cardiovascular: Negative  Negative for chest pain, palpitations and leg swelling  Gastrointestinal: Negative for abdominal pain, blood in stool, constipation, diarrhea, nausea and vomiting  Endocrine: Negative for cold intolerance, heat intolerance, polydipsia, polyphagia and polyuria  Genitourinary: Negative for difficulty urinating, dysuria, flank pain and urgency  Musculoskeletal: Negative for arthralgias, back pain, gait problem and myalgias  Skin: Negative for rash  Allergic/Immunologic: Negative  Neurological: Negative for dizziness, weakness, light-headedness and headaches  Hematological: Negative  Psychiatric/Behavioral: Negative for behavioral problems, dysphoric mood and sleep disturbance  The patient is not nervous/anxious            PHQ-9 Depression Screening    PHQ-9:   Frequency of the following problems over the past two weeks:      Little interest or pleasure in doing things: 0 - not at all  Feeling down, depressed, or hopeless: 0 - not at all  PHQ-2 Score: 0          Objective:  Vitals:    02/15/21 0812   BP: 128/72   BP Location: Left arm   Patient Position: Sitting   Cuff Size: Adult   Pulse: 105   Resp: 18   Temp: 98 2 °F (36 8 °C)   TempSrc: Temporal   SpO2: 95%   Weight: 121 kg (267 lb)   Height: 5' 11" (1 803 m)     Body mass index is 37 24 kg/m²  Physical Exam  Constitutional:       General: He is not in acute distress  Appearance: He is well-developed  He is not diaphoretic  HENT:      Head: Normocephalic and atraumatic  Right Ear: External ear normal       Left Ear: External ear normal       Nose: Nose normal       Mouth/Throat:      Pharynx: No oropharyngeal exudate  Eyes:      General: No scleral icterus  Right eye: No discharge  Left eye: No discharge  Conjunctiva/sclera: Conjunctivae normal       Pupils: Pupils are equal, round, and reactive to light  Neck:      Musculoskeletal: Normal range of motion and neck supple  Thyroid: No thyromegaly  Cardiovascular:      Rate and Rhythm: Normal rate and regular rhythm  Heart sounds: Normal heart sounds  No murmur  No friction rub  No gallop  Pulmonary:      Effort: Pulmonary effort is normal  No respiratory distress  Breath sounds: Normal breath sounds  No wheezing or rales  Abdominal:      General: Bowel sounds are normal  There is no distension  Palpations: Abdomen is soft  Tenderness: There is no abdominal tenderness  Musculoskeletal: Normal range of motion  General: No tenderness or deformity  Skin:     General: Skin is warm and dry  Neurological:      Mental Status: He is alert and oriented to person, place, and time  Cranial Nerves: No cranial nerve deficit  Psychiatric:         Behavior: Behavior normal          Thought Content: Thought content normal          Judgment: Judgment normal        BMI Counseling: Body mass index is 37 24 kg/m²  The BMI is above normal  Nutrition recommendations include decreasing portion sizes, consuming healthier snacks and limiting drinks that contain sugar

## 2021-02-15 NOTE — PROGRESS NOTES
Assessment and Plan:     Problem List Items Addressed This Visit     None        BMI Counseling: Body mass index is 37 24 kg/m²  The BMI is above normal  Nutrition recommendations include decreasing portion sizes, consuming healthier snacks and limiting drinks that contain sugar  Preventive health issues were discussed with patient, and age appropriate screening tests were ordered as noted in patient's After Visit Summary  Personalized health advice and appropriate referrals for health education or preventive services given if needed, as noted in patient's After Visit Summary       History of Present Illness:     Patient presents for Medicare Annual Wellness visit    Patient Care Team:  Avi Avila PA-C as PCP - General (Internal Medicine)     Problem List:     Patient Active Problem List   Diagnosis    Primary insomnia    Hyperlipidemia    Hypertension    Abnormal stress echo    Elevated fasting glucose    Restless legs syndrome    Post-nasal drip      Past Medical and Surgical History:     Past Medical History:   Diagnosis Date    Arthritis     Hyperlipidemia     Hypertension     Obesity      Past Surgical History:   Procedure Laterality Date    CARDIAC SURGERY      heart catherization    COLONOSCOPY      ELBOW SURGERY      HAND SURGERY Bilateral     KNEE SURGERY      WRIST SURGERY        Family History:     Family History   Problem Relation Age of Onset    Rheum arthritis Mother     Lupus Mother     Heart disease Father     Diabetes Sister     Kidney disease Sister     Heart disease Paternal Grandmother       Social History:        Social History     Socioeconomic History    Marital status: /Civil Union     Spouse name: None    Number of children: None    Years of education: None    Highest education level: None   Occupational History    Occupation: RETIRED   Social Needs    Financial resource strain: None    Food insecurity     Worry: None     Inability: None  Transportation needs     Medical: None     Non-medical: None   Tobacco Use    Smoking status: Former Smoker     Years: 10 00     Types: Cigarettes    Smokeless tobacco: Never Used   Substance and Sexual Activity    Alcohol use: Yes     Frequency: 2-3 times a week     Drinks per session: 3 or 4     Comment: SOCIAL    Drug use: No    Sexual activity: None   Lifestyle    Physical activity     Days per week: None     Minutes per session: None    Stress: None   Relationships    Social connections     Talks on phone: None     Gets together: None     Attends [employer] service: None     Active member of club or organization: None     Attends meetings of clubs or organizations: None     Relationship status: None    Intimate partner violence     Fear of current or ex partner: None     Emotionally abused: None     Physically abused: None     Forced sexual activity: None   Other Topics Concern    None   Social History Narrative    RETIRED          Medications and Allergies:     Current Outpatient Medications   Medication Sig Dispense Refill    aspirin 81 MG tablet Take 81 mg by mouth daily      diphenhydrAMINE (BENADRYL) 25 mg tablet Take 25 mg by mouth daily at bedtime as needed for itching      ergocalciferol (VITAMIN D2) 50,000 units Take 1 capsule (50,000 Units total) by mouth once a week 4 capsule 3    lisinopril-hydrochlorothiazide (PRINZIDE,ZESTORETIC) 20-25 MG per tablet Take 1 tablet by mouth daily 90 tablet 1    pravastatin (PRAVACHOL) 40 mg tablet Take 1 tablet by mouth once daily 90 tablet 1    metoprolol tartrate (LOPRESSOR) 25 mg tablet TAKE 1 TABLET BY MOUTH EVERY 12 HOURS (Patient not taking: Reported on 2/15/2021) 180 tablet 1     No current facility-administered medications for this visit        Allergies   Allergen Reactions    Other      Summertime grass, weeds      Immunizations:     Immunization History   Administered Date(s) Administered    INFLUENZA 11/03/2016, 09/15/2017, 10/18/2019, 12/04/2019    Influenza Split High Dose Preservative Free IM 10/18/2019    Influenza, high dose seasonal 0 7 mL 12/18/2018    Pneumococcal Conjugate 13-Valent 06/19/2018      Health Maintenance:         Topic Date Due    Colorectal Cancer Screening  09/12/2027    Hepatitis C Screening  Completed         Topic Date Due    DTaP,Tdap,and Td Vaccines (1 - Tdap) 11/16/1971    Pneumococcal Vaccine: 65+ Years (2 of 2 - PPSV23) 06/19/2019    Influenza Vaccine (1) 09/01/2020      Medicare Health Risk Assessment:     /72 (BP Location: Left arm, Patient Position: Sitting, Cuff Size: Adult)   Pulse 105   Temp 98 2 °F (36 8 °C) (Temporal)   Resp 18   Ht 5' 11" (1 803 m)   Wt 121 kg (267 lb)   SpO2 95%   BMI 37 24 kg/m²      Vandana Torre is here for his Subsequent Wellness visit  Health Risk Assessment:   Patient rates overall health as good  Patient feels that their physical health rating is same  Eyesight was rated as same  Hearing was rated as same  Patient feels that their emotional and mental health rating is same  Pain experienced in the last 7 days has been none  Patient states that he has experienced no weight loss or gain in last 6 months  Depression Screening:   PHQ-2 Score: 0      Fall Risk Screening: In the past year, patient has experienced: no history of falling in past year      Home Safety:  Patient does not have trouble with stairs inside or outside of their home  Patient has working smoke alarms and has working carbon monoxide detector  Home safety hazards include: none  Nutrition:   Current diet is Regular and Diabetic  Medications:   Patient is not currently taking any over-the-counter supplements  Patient is able to manage medications  Activities of Daily Living (ADLs)/Instrumental Activities of Daily Living (IADLs):   Walk and transfer into and out of bed and chair?: Yes  Dress and groom yourself?: Yes    Bathe or shower yourself?: Yes    Feed yourself? Yes  Do your laundry/housekeeping?: Yes  Manage your money, pay your bills and track your expenses?: Yes  Make your own meals?: Yes    Do your own shopping?: Yes    Previous Hospitalizations:   Any hospitalizations or ED visits within the last 12 months?: No      Advance Care Planning:   Living will: Yes    Durable POA for healthcare:  Yes    Advanced directive: Yes      Cognitive Screening:   Provider or family/friend/caregiver concerned regarding cognition?: No    PREVENTIVE SCREENINGS      Cardiovascular Screening:    General: Screening Not Indicated and History Lipid Disorder      Diabetes Screening:     General: Screening Current      Colorectal Cancer Screening:     General: Screening Current      Prostate Cancer Screening:    General: Screening Current      Osteoporosis Screening:    General: Screening Not Indicated      Abdominal Aortic Aneurysm (AAA) Screening:    Risk factors include: age between 73-67 yo and tobacco use        Lung Cancer Screening:     General: Screening Not Indicated      Hepatitis C Screening:    General: Screening Current      Familia Mendez PA-C

## 2021-02-15 NOTE — PATIENT INSTRUCTIONS

## 2021-02-21 DIAGNOSIS — E78.5 HYPERLIPIDEMIA, UNSPECIFIED HYPERLIPIDEMIA TYPE: ICD-10-CM

## 2021-02-22 RX ORDER — PRAVASTATIN SODIUM 40 MG
TABLET ORAL
Qty: 90 TABLET | Refills: 1 | Status: SHIPPED | OUTPATIENT
Start: 2021-02-22 | End: 2021-02-23 | Stop reason: SDUPTHER

## 2021-02-23 DIAGNOSIS — E78.5 HYPERLIPIDEMIA, UNSPECIFIED HYPERLIPIDEMIA TYPE: ICD-10-CM

## 2021-02-23 RX ORDER — PRAVASTATIN SODIUM 40 MG
40 TABLET ORAL DAILY
Qty: 90 TABLET | Refills: 1 | Status: SHIPPED | OUTPATIENT
Start: 2021-02-23 | End: 2021-08-27 | Stop reason: SDUPTHER

## 2021-03-08 ENCOUNTER — TELEPHONE (OUTPATIENT)
Dept: INTERNAL MEDICINE CLINIC | Facility: CLINIC | Age: 71
End: 2021-03-08

## 2021-03-08 ENCOUNTER — OFFICE VISIT (OUTPATIENT)
Dept: INTERNAL MEDICINE CLINIC | Facility: CLINIC | Age: 71
End: 2021-03-08
Payer: MEDICARE

## 2021-03-08 VITALS
OXYGEN SATURATION: 98 % | BODY MASS INDEX: 37.46 KG/M2 | WEIGHT: 267.6 LBS | RESPIRATION RATE: 16 BRPM | SYSTOLIC BLOOD PRESSURE: 136 MMHG | TEMPERATURE: 98 F | HEART RATE: 103 BPM | DIASTOLIC BLOOD PRESSURE: 70 MMHG | HEIGHT: 71 IN

## 2021-03-08 DIAGNOSIS — J01.10 ACUTE NON-RECURRENT FRONTAL SINUSITIS: Primary | ICD-10-CM

## 2021-03-08 DIAGNOSIS — R09.81 NASAL CONGESTION: ICD-10-CM

## 2021-03-08 DIAGNOSIS — E66.01 OBESITY, MORBID (HCC): ICD-10-CM

## 2021-03-08 DIAGNOSIS — H66.93 BILATERAL OTITIS MEDIA, UNSPECIFIED OTITIS MEDIA TYPE: ICD-10-CM

## 2021-03-08 PROCEDURE — 99213 OFFICE O/P EST LOW 20 MIN: CPT | Performed by: NURSE PRACTITIONER

## 2021-03-08 RX ORDER — CETIRIZINE HYDROCHLORIDE 10 MG/1
10 TABLET, CHEWABLE ORAL DAILY
Qty: 30 TABLET | Refills: 0 | Status: SHIPPED | OUTPATIENT
Start: 2021-03-08 | End: 2021-05-14

## 2021-03-08 RX ORDER — PREDNISONE 10 MG/1
TABLET ORAL
Qty: 21 TABLET | Refills: 0 | Status: SHIPPED | OUTPATIENT
Start: 2021-03-08 | End: 2021-03-17

## 2021-03-08 RX ORDER — CIPROFLOXACIN AND DEXAMETHASONE 3; 1 MG/ML; MG/ML
4 SUSPENSION/ DROPS AURICULAR (OTIC) 2 TIMES DAILY
Qty: 7.5 ML | Refills: 0 | Status: SHIPPED | OUTPATIENT
Start: 2021-03-08 | End: 2021-05-14

## 2021-03-08 RX ORDER — AMOXICILLIN AND CLAVULANATE POTASSIUM 875; 125 MG/1; MG/1
1 TABLET, FILM COATED ORAL EVERY 12 HOURS SCHEDULED
Qty: 14 TABLET | Refills: 0 | Status: SHIPPED | OUTPATIENT
Start: 2021-03-08 | End: 2021-03-15

## 2021-03-08 RX ORDER — LORATADINE 10 MG/1
10 TABLET ORAL DAILY
COMMUNITY
End: 2021-07-02

## 2021-03-08 NOTE — TELEPHONE ENCOUNTER
Pharmacy called because his ears drops are not covered by his insurance, can something else be called in

## 2021-03-08 NOTE — PATIENT INSTRUCTIONS

## 2021-03-19 ENCOUNTER — TELEPHONE (OUTPATIENT)
Dept: INTERNAL MEDICINE CLINIC | Facility: CLINIC | Age: 71
End: 2021-03-19

## 2021-03-19 DIAGNOSIS — J01.10 ACUTE NON-RECURRENT FRONTAL SINUSITIS: Primary | ICD-10-CM

## 2021-03-19 RX ORDER — PREDNISONE 10 MG/1
TABLET ORAL
Qty: 21 TABLET | Refills: 0 | Status: SHIPPED | OUTPATIENT
Start: 2021-03-19 | End: 2021-03-28

## 2021-03-19 RX ORDER — AMOXICILLIN AND CLAVULANATE POTASSIUM 875; 125 MG/1; MG/1
1 TABLET, FILM COATED ORAL EVERY 12 HOURS SCHEDULED
Qty: 14 TABLET | Refills: 0 | Status: SHIPPED | OUTPATIENT
Start: 2021-03-19 | End: 2021-03-26

## 2021-03-19 NOTE — TELEPHONE ENCOUNTER
Pt called, saw you 3-8-21 for sinus inf, finished the augmentin 5 days ago, was feeling better  But now seems like the sx's are coming back again, has a lot of sinus congestion , drainage, asking for another rx? pls advise

## 2021-04-02 ENCOUNTER — IMMUNIZATIONS (OUTPATIENT)
Dept: FAMILY MEDICINE CLINIC | Facility: HOSPITAL | Age: 71
End: 2021-04-02

## 2021-04-02 DIAGNOSIS — Z23 ENCOUNTER FOR IMMUNIZATION: Primary | ICD-10-CM

## 2021-04-02 PROCEDURE — 0011A SARS-COV-2 / COVID-19 MRNA VACCINE (MODERNA) 100 MCG: CPT

## 2021-04-02 PROCEDURE — 91301 SARS-COV-2 / COVID-19 MRNA VACCINE (MODERNA) 100 MCG: CPT

## 2021-04-05 ENCOUNTER — OFFICE VISIT (OUTPATIENT)
Dept: INTERNAL MEDICINE CLINIC | Facility: CLINIC | Age: 71
End: 2021-04-05
Payer: MEDICARE

## 2021-04-05 VITALS — WEIGHT: 267 LBS | BODY MASS INDEX: 37.38 KG/M2 | TEMPERATURE: 99.7 F | HEIGHT: 71 IN

## 2021-04-05 DIAGNOSIS — U07.1 COVID-19: Primary | ICD-10-CM

## 2021-04-05 DIAGNOSIS — F41.8 ANXIETY ABOUT HEALTH: ICD-10-CM

## 2021-04-05 PROBLEM — R45.89 ANXIETY ABOUT HEALTH: Status: ACTIVE | Noted: 2021-04-05

## 2021-04-05 PROCEDURE — 99213 OFFICE O/P EST LOW 20 MIN: CPT | Performed by: NURSE PRACTITIONER

## 2021-04-05 PROCEDURE — U0003 INFECTIOUS AGENT DETECTION BY NUCLEIC ACID (DNA OR RNA); SEVERE ACUTE RESPIRATORY SYNDROME CORONAVIRUS 2 (SARS-COV-2) (CORONAVIRUS DISEASE [COVID-19]), AMPLIFIED PROBE TECHNIQUE, MAKING USE OF HIGH THROUGHPUT TECHNOLOGIES AS DESCRIBED BY CMS-2020-01-R: HCPCS | Performed by: NURSE PRACTITIONER

## 2021-04-05 PROCEDURE — U0005 INFEC AGEN DETEC AMPLI PROBE: HCPCS | Performed by: NURSE PRACTITIONER

## 2021-04-05 RX ORDER — ALPRAZOLAM 0.25 MG/1
0.25 TABLET ORAL
Qty: 20 TABLET | Refills: 0 | Status: SHIPPED | OUTPATIENT
Start: 2021-04-05 | End: 2021-04-15

## 2021-04-05 NOTE — PROGRESS NOTES
COVID-19 Outpatient Progress Note    Assessment/Plan:    Problem List Items Addressed This Visit        Other    COVID-19 - Primary     · S/S started    earlier in march with otitis media and sinusitis, has continued dry mouth/throat, and insomnia  · Positive sick contacts: Amanuel Dangkarynarichie  none  · COVID-19 test on     4/5/2021  · Positive for:    dry mouth, insomnia  · Denies the following:    all of covid s/s: fever, chills, shortness of breath, cough, bodyaches, headache, loss of taste or smell  · Will have follow up in 2 days  · Discussed isolation/quarantine until 24 hours after all symptoms have stopped  · If your test is positive for COVID-19 you should stay Isolated for at least 10 days since you first has symptoms  · If your test is negative for COVID-19 you should stay Quarantined for at least 14 days from when you were exposed  · Discussed keeping active to prevent blood clots - do not just lay around  · Discussed deep breathing exercises, reposition yourself often on your side, back and stomach while you are resting/sleeping   Reminded to change toothbrush to prevent re-contamination  · Follow up visit with me in     days  · Start taking Vitamin C 1000mg by mouth daily  · Start taking Vitamin D 2000mg by mouth daily  · Start taking Zinc 220mg by mouth daily OR a multivitamin daily    Transmission is by respiratory secretions   Keep a Distance of at Least 6 Feet   Cover your Mouth and Nose AT ALL TIMES   Wash Your Hands Often   Signs and Symptoms May Show Up 2 to 14 Days AFTER You have Been Exposed   Fever, Cough, Shortness of Breath   Chills, Diarrhea, Nausea, Vomiting, Abdominal Pain   Muscle Aches, Headaches, Dizziness   Post Nasal Drip, Sore Throat, Loss of Taste and/or Smell   Some or All of These Signs and Symptoms Can Last For Days, Weeks, or Even 3 to 6 Months         Relevant Orders    Novel Coronavirus (COVID-19), PCR [facility] Collected in Office    Anxiety about health     · Related to COVID-19? · Will perform Covid swab 4/5/2021  · 3/8/2021: dx: with bilateral otitis media, nasal congestion, frontal sinusitis  · Will order xanax to be taken at bedtime  · Follow up in 2 days         Relevant Medications    ALPRAZolam (XANAX) 0 25 mg tablet         Disposition:     I recommended the patient to come to our office to perform PCR testing for COVID-19  I have spent 20 minutes directly with the patient  Greater than 50% of this time was spent in counseling/coordination of care regarding: diagnostic results, prognosis, risks and benefits of treatment options, instructions for management, patient and family education, importance of treatment compliance, risk factor reductions and impressions  Encounter provider MARIA ANTONIA Nolan    Provider located at [address] [place] [id number]    Recent Visits  No visits were found meeting these conditions  Showing recent visits within past 7 days and meeting all other requirements     Today's Visits  Date Type Provider Dept   04/05/21 Office Visit MARIA ANTONIA Nolan [facility] today's visits and meeting all other requirements     Future Appointments  No visits were found meeting these conditions  Showing future appointments within next 150 days and meeting all other requirements        Patient agrees to participate in a virtual check in via telephone or video visit instead of presenting to the office to address urgent/immediate medical needs  Patient is aware this is a billable service  After connecting through Telephone, the patient was identified by name and date of birth  Rosaura Files was informed that this was a telemedicine visit and that the exam was being conducted confidentially over secure lines  My office door was closed  No one else was in the room   Rosaura Files acknowledged consent and understanding of privacy and security of the telemedicine visit  I informed the patient that I have reviewed his record in Epic and presented the opportunity for him to ask any questions regarding the visit today  The patient agreed to participate  Subjective:   Delicia Person is a 65 y o  male who is concerned about COVID-19  Patient denies fever, chills, fatigue, congestion, rhinorrhea, sore throat, cough, shortness of breath, chest tightness, abdominal pain, nausea, vomiting, diarrhea, myalgias and headaches       Exposure:   Contact with a person who is under investigation (PUI) for or who is positive for COVID-19 within the last 14 days?: No    Hospitalized recently for fever and/or lower respiratory symptoms?: No      Currently a healthcare worker that is involved in direct patient care?: No      Works in a special setting where the risk of COVID-19 transmission may be high? (this may include long-term care, correctional and [facility] facilities; homeless shelters; assisted-living facilities and group homes ): No      Resident in a special setting where the risk of COVID-19 transmission may be high? (this may include long-term care, correctional and [facility] facilities; homeless shelters; assisted-living facilities and group homes ): No      No results found for: [place] [address], [address] [facility], [address] [place] [address],[address], [place] [address]  Past Medical History:   Diagnosis Date    Arthritis     Hyperlipidemia     Hypertension     Obesity      Past Surgical History:   Procedure Laterality Date    CARDIAC SURGERY      heart catherization    COLONOSCOPY      ELBOW SURGERY      HAND SURGERY Bilateral     KNEE SURGERY      WRIST SURGERY       Current Outpatient Medications   Medication Sig Dispense Refill    aspirin 81 MG tablet Take 81 mg by mouth daily      cetirizine (ZyrTEC) 10 MG chewable tablet Chew 1 tablet (10 mg total) daily 30 tablet 0    ciprofloxacin-dexamethasone (CIPRODEX) otic suspension Administer 4 drops into both ears 2 (two) times a day 7 5 mL 0  diphenhydrAMINE (BENADRYL) 25 mg tablet Take 25 mg by mouth daily at bedtime as needed for itching      ergocalciferol (VITAMIN D2) 50,000 units Take 1 capsule (50,000 Units total) by mouth once a week 4 capsule 3    lisinopril-hydrochlorothiazide (PRINZIDE,ZESTORETIC) 20-25 MG per tablet Take 1 tablet by mouth daily 90 tablet 1    loratadine (CLARITIN) 10 mg tablet Take 10 mg by mouth daily      pravastatin (PRAVACHOL) 40 mg tablet Take 1 tablet (40 mg total) by mouth daily 90 tablet 1    ALPRAZolam (XANAX) 0 25 mg tablet Take 1 tablet (0 25 mg total) by mouth daily at bedtime as needed for anxiety or sleep (Patient not taking: Reported on 4/5/2021) 20 tablet 0    metoprolol tartrate (LOPRESSOR) 25 mg tablet TAKE 1 TABLET BY MOUTH EVERY 12 HOURS (Patient not taking: Reported on 2/15/2021) 180 tablet 1     No current facility-administered medications for this visit  Allergies   Allergen Reactions    Other      Summertime grass, weeds       Review of Systems   Constitutional: Negative for activity change, appetite change, chills, fatigue and fever  HENT: Negative for congestion, ear pain, postnasal drip, rhinorrhea, sinus pressure, sinus pain, sore throat and trouble swallowing  Dry mouth and throat   Eyes: Negative for pain  Respiratory: Negative for cough, chest tightness and shortness of breath  Cardiovascular: Negative for chest pain, palpitations and leg swelling  Gastrointestinal: Negative for abdominal pain, constipation, diarrhea, nausea and vomiting  Genitourinary: Negative for difficulty urinating, flank pain, frequency and urgency  Musculoskeletal: Negative for back pain, joint swelling and myalgias  Skin: Negative for color change  Neurological: Negative for dizziness, weakness, light-headedness and headaches  Psychiatric/Behavioral: Negative for sleep disturbance  The patient is nervous/anxious        Objective:    Vitals:    04/05/21 1112   Temp: 99 7 °F (37 6 °C)   Weight: 121 kg (267 lb)   Height: 5' 11" (1 803 m)       Physical Exam  Vitals signs reviewed  Constitutional:       General: He is awake  Appearance: Normal appearance  He is well-developed  HENT:      Head: Normocephalic  Nose: Nose normal       Mouth/Throat:      Mouth: Mucous membranes are moist       Comments: Dry mouth and throat  Eyes:      Extraocular Movements: Extraocular movements intact  Neck:      Musculoskeletal: Normal range of motion  Cardiovascular:      Rate and Rhythm: Normal rate  Pulmonary:      Effort: Pulmonary effort is normal  No respiratory distress  Abdominal:      Palpations: Abdomen is soft  Musculoskeletal: Normal range of motion  Skin:     General: Skin is warm and dry  Neurological:      Mental Status: He is alert and oriented to person, place, and time  Psychiatric:         Attention and Perception: Attention normal          Mood and Affect: Mood is anxious  Speech: Speech normal          Behavior: Behavior normal  Behavior is cooperative  VIRTUAL VISIT DISCLAIMER    Devaughn Driscoll acknowledges that he has consented to an online visit or consultation  He understands that the online visit is based solely on information provided by him, and that, in the absence of a face-to-face physical evaluation by the physician, the diagnosis he receives is both limited and provisional in terms of accuracy and completeness  This is not intended to replace a full medical face-to-face evaluation by the physician  Devaughn Driscoll understands and accepts these terms

## 2021-04-05 NOTE — ASSESSMENT & PLAN NOTE
· Related to COVID-19? · Will perform Covid swab 4/5/2021  · 3/8/2021: dx: with bilateral otitis media, nasal congestion, frontal sinusitis    · Will order xanax to be taken at bedtime  · Follow up in 2 days

## 2021-04-05 NOTE — ASSESSMENT & PLAN NOTE
· S/S started    earlier in march with otitis media and sinusitis, has continued dry mouth/throat, and insomnia  · Positive sick contacts: Dalia Navarro  none  · COVID-19 test on     4/5/2021  · Positive for:    dry mouth, insomnia  · Denies the following:    all of covid s/s: fever, chills, shortness of breath, cough, bodyaches, headache, loss of taste or smell  · Will have follow up in 2 days  · Discussed isolation/quarantine until 24 hours after all symptoms have stopped  · If your test is positive for COVID-19 you should stay Isolated for at least 10 days since you first has symptoms  · If your test is negative for COVID-19 you should stay Quarantined for at least 14 days from when you were exposed  · Discussed keeping active to prevent blood clots - do not just lay around  · Discussed deep breathing exercises, reposition yourself often on your side, back and stomach while you are resting/sleeping   Reminded to change toothbrush to prevent re-contamination  · Follow up visit with me in     days  · Start taking Vitamin C 1000mg by mouth daily  · Start taking Vitamin D 2000mg by mouth daily  · Start taking Zinc 220mg by mouth daily OR a multivitamin daily    Transmission is by respiratory secretions   Keep a Distance of at Least 6 Feet   Cover your Mouth and Nose AT ALL TIMES   Wash Your Hands Often   Signs and Symptoms May Show Up 2 to 14 Days AFTER You have Been Exposed   Fever, Cough, Shortness of Breath   Chills, Diarrhea, Nausea, Vomiting, Abdominal Pain   Muscle Aches, Headaches, Dizziness   Post Nasal Drip, Sore Throat, Loss of Taste and/or Smell   Some or All of These Signs and Symptoms Can Last For Days, Weeks, or Even 3 to 6 Months

## 2021-04-05 NOTE — PATIENT INSTRUCTIONS
[address]    Your healthcare provider and/or public health staff have evaluated you and have determined that you do not need to remain in the hospital at this time  At this time you can be isolated at home where you will be monitored by staff from your local or state health department  You should carefully follow the prevention and isolation steps below until a healthcare provider or local or state health department says that you can return to your normal activities  Stay home except to get medical care    People who are mildly ill with COVID-19 are able to isolate at home during their illness  You should restrict activities outside your home, except for getting medical care  Do not go to work, school, or public areas  Avoid using public transportation, ride-sharing, or taxis  Separate yourself from other people and animals in your home    People: As much as possible, you should stay in a specific room and away from other people in your home  Also, you should use a separate bathroom, if available  Animals: You should restrict contact with pets and other animals while you are sick with COVID-19, just like you would around other people  Although there have not been reports of pets or other animals becoming sick with COVID-19, it is still recommended that people sick with COVID-19 limit contact with animals until more information is known about the virus  When possible, have another member of your household care for your animals while you are sick  If you are sick with COVID-19, avoid contact with your pet, including petting, snuggling, being kissed or licked, and sharing food  If you must care for your pet or be around animals while you are sick, wash your hands before and after you interact with pets and wear a facemask  See COVID-19 and Animals for more information      Call ahead before visiting your doctor    If you have a medical appointment, call the healthcare provider and tell them that you have or may have COVID-19  This will help the healthcare providers office take steps to keep other people from getting infected or exposed  Wear a facemask    You should wear a facemask when you are around other people (e g , sharing a room or vehicle) or pets and before you enter a healthcare providers office  If you are not able to wear a facemask (for example, because it causes trouble breathing), then people who live with you should not stay in the same room with you, or they should wear a facemask if they enter your room  Cover your coughs and sneezes    Cover your mouth and nose with a tissue when you cough or sneeze  Throw used tissues in a lined trash can  Immediately wash your hands with soap and water for at least 20 seconds or, if soap and water are not available, clean your hands with an alcohol-based hand  that contains at least 60% alcohol  Clean your hands often    Wash your hands often with soap and water for at least 20 seconds, especially after blowing your nose, coughing, or sneezing; going to the bathroom; and before eating or preparing food  If soap and water are not readily available, use an alcohol-based hand  with at least 60% alcohol, covering all surfaces of your hands and rubbing them together until they feel dry  Soap and water are the best option if hands are visibly dirty  Avoid touching your eyes, nose, and mouth with unwashed hands  Avoid sharing personal household items    You should not share dishes, drinking glasses, cups, eating utensils, towels, or bedding with other people or pets in your home  After using these items, they should be washed thoroughly with soap and water  Clean all high-touch surfaces everyday    High touch surfaces include counters, tabletops, doorknobs, bathroom fixtures, toilets, phones, keyboards, tablets, and bedside tables  Also, clean any surfaces that may have blood, stool, or body fluids on them   Use a household cleaning spray or wipe, according to the label instructions  Labels contain instructions for safe and effective use of the cleaning product including precautions you should take when applying the product, such as wearing gloves and making sure you have good ventilation during use of the product  Monitor your symptoms    Seek prompt medical attention if your illness is worsening (e g , difficulty breathing)  Before seeking care, call your healthcare provider and tell them that you have, or are being evaluated for, COVID-19  Put on a facemask before you enter the facility  These steps will help the healthcare providers office to keep other people in the office or waiting room from getting infected or exposed  Ask your healthcare provider to call the local or [facility] health department  Persons who are placed under active monitoring or facilitated self-monitoring should follow instructions provided by their local health department or occupational health professionals, as appropriate  If you have a medical emergency and need to call 911, notify the dispatch personnel that you have, or are being evaluated for COVID-19  If possible, put on a facemask before emergency medical services arrive      Discontinuing home isolation    Patients with confirmed COVID-19 should remain under home isolation precautions until the following conditions are met:   - They have had no fever for at least 24 hours (that is one full day of no fever without the use medicine that reduces fevers)  AND  - other symptoms have improved (for example, when their cough or shortness of breath have improved)  AND  - If had mild or moderate illness, at least 10 days have passed since their symptoms first appeared or if severe illness (needed oxygen) or immunosuppressed, at least 20 days have passed since symptoms first appeared  Patients with confirmed COVID-19 should also notify close contacts (including their workplace) and ask that they self-quarantine  Currently, close contact is defined as being within 6 feet for 15 minutes or more from the period 24 hours starting 48 hours before symptom onset to the time at which the patient went into isolation  Close contacts of patients diagnosed with COVID-19 should be instructed by the patient to self-quarantine for 14 days from the last time of their last contact with the patient       Source: [employer]       · Discussed isolation/quarantine until 24 hours after all symptoms have stopped  · If your test is positive for COVID-19 you should stay Isolated for at least 10 days since you first has symptoms  · If your test is negative for COVID-19 you should stay Quarantined for at least 14 days from when you were exposed  · Discussed keeping active to prevent blood clots - do not just lay around  · Discussed deep breathing exercises, reposition yourself often on your side, back and stomach while you are resting/sleeping   Reminded to change toothbrush to prevent re-contamination  · Start taking Vitamin C 1000mg by mouth daily  · Start taking Vitamin D 2000mg by mouth daily  · Start taking Zinc 220mg by mouth daily OR a multivitamin daily    Transmission is by respiratory secretions   Keep a Distance of at Least 6 Feet   Cover your Mouth and Nose AT ALL TIMES   Wash Your Hands Often   Signs and Symptoms May Show Up 2 to 14 Days AFTER You have Been Exposed   Fever, Cough, Shortness of Breath   Chills, Diarrhea, Nausea, Vomiting, Abdominal Pain   Muscle Aches, Headaches, Dizziness   Post Nasal Drip, Sore Throat, Loss of Taste and/or Smell   Some or All of These Signs and Symptoms Can Last For Days, Weeks, or Even 3 to 6 Months

## 2021-04-06 ENCOUNTER — TELEPHONE (OUTPATIENT)
Dept: INTERNAL MEDICINE CLINIC | Facility: CLINIC | Age: 71
End: 2021-04-06

## 2021-04-06 LAB — SARS-COV-2 RNA RESP QL NAA+PROBE: NEGATIVE

## 2021-04-07 ENCOUNTER — TELEMEDICINE (OUTPATIENT)
Dept: INTERNAL MEDICINE CLINIC | Facility: CLINIC | Age: 71
End: 2021-04-07
Payer: MEDICARE

## 2021-04-07 VITALS — BODY MASS INDEX: 37.38 KG/M2 | WEIGHT: 267 LBS | HEIGHT: 71 IN

## 2021-04-07 DIAGNOSIS — F41.8 ANXIETY ABOUT HEALTH: Primary | ICD-10-CM

## 2021-04-07 DIAGNOSIS — Z20.822 COVID-19 RULED OUT: ICD-10-CM

## 2021-04-07 DIAGNOSIS — G47.09 OTHER INSOMNIA: ICD-10-CM

## 2021-04-07 PROCEDURE — 99212 OFFICE O/P EST SF 10 MIN: CPT | Performed by: NURSE PRACTITIONER

## 2021-04-07 NOTE — PROGRESS NOTES
Virtual Regular Visit      Assessment/Plan:    Problem List Items Addressed This Visit        Other    Other insomnia     · Is going to try melatonin again         COVID-19 ruled out     · S/S started    earlier in march with otitis media and sinusitis, has continued dry mouth/throat, and insomnia  · Positive sick contacts: Aruna Rondon  none  · NEGATIVE COVID-19 test on     4/5/2021  · Initially Positive for:    dry mouth, insomnia  · Initially Denies the following:    all of covid s/s: fever, chills, shortness of breath, cough, bodyaches, headache, loss of taste or smell  · Feels better just some mild congestion  · Discussed isolation/quarantine until 24 hours after all symptoms have stopped  ? If your test is positive for COVID-19 you should stay Isolated for at least 10 days since you first has symptoms  ? If your test is negative for COVID-19 you should stay Quarantined for at least 14 days from when you were exposed  · Discussed keeping active to prevent blood clots - do not just lay around  · Discussed deep breathing exercises, reposition yourself often on your side, back and stomach while you are resting/sleeping  · Reminded to change toothbrush to prevent re-contamination  · Follow up visit with me in     days  · Start taking Vitamin C 1000mg by mouth daily  · Start taking Vitamin D 2000mg by mouth daily  · Start taking Zinc 220mg by mouth daily OR a multivitamin daily   · Transmission is by respiratory secretions  · Keep a Distance of at Least 6 Feet  · Cover your Mouth and Nose AT ALL TIMES  · Wash Your Hands Often  · Signs and Symptoms May Show Up 2 to 14 Days AFTER You have Been Exposed  · Fever, Cough, Shortness of Breath  · Chills, Diarrhea, Nausea, Vomiting, Abdominal Pain  · Muscle Aches, Headaches, Dizziness  · Post Nasal Drip, Sore Throat, Loss of Taste and/or Smell  · Some or All of These Signs and Symptoms Can Last For Days, Weeks, or Even 3 to 6 Months         Anxiety about health - Primary     · Related to COVID-19? · Will perform Covid swab 4/5/2021  · 3/8/2021: dx: with bilateral otitis media, nasal congestion, frontal sinusitis  · Will order xanax to be taken at bedtime  · Improved anxiety                    Reason for visit is   Chief Complaint   Patient presents with   [phone] [id number]     phone call [phone] cov neg  pt states he is feeling better some congestion         Encounter provider MARIA ANTONIA Grey    Provider located at [address]  [address] [place] [id number]      Recent Visits  Date Type Provider Dept   04/06/21 Telephone [facility]   04/05/21 Office Visit MARIA ANTONIA Grey  [place] recent visits within past 7 days and meeting all other requirements     Today's Visits  Date Type Provider Dept   04/07/21 Telemedicine Lu Michelle, [address]  [facility]  today's visits and meeting all other requirements     Future Appointments  No visits were found meeting these conditions  Showing future appointments within next 150 days and meeting all other requirements        The patient was identified by name and date of birth  Timmy Ferguson was informed that this is a telemedicine visit and that the visit is being conducted through facetime/phone call and patient was informed that this is not a secure, HIPAA-compliant platform  He agrees to proceed     My office door was closed  No one else was in the room  He acknowledged consent and understanding of privacy and security of the video platform  The patient has agreed to participate and understands they can discontinue the visit at any time  Patient is aware this is a billable service  Subjective  Timmy Ferguson is a 65 y o  male     Follow up appointment to discuss negative COVID-19 result  We also discussed his anxiety regarding his health and his sleeping patterns   He is doing much better and feels that the anti-anxiety medication are working  Past Medical History:   Diagnosis Date    Arthritis     Hyperlipidemia     Hypertension     Obesity        Past Surgical History:   Procedure Laterality Date    CARDIAC SURGERY      heart catherization    COLONOSCOPY      ELBOW SURGERY      HAND SURGERY Bilateral     KNEE SURGERY      WRIST SURGERY         Current Outpatient Medications   Medication Sig Dispense Refill    ALPRAZolam (XANAX) 0 25 mg tablet Take 1 tablet (0 25 mg total) by mouth daily at bedtime as needed for anxiety or sleep 20 tablet 0    aspirin 81 MG tablet Take 81 mg by mouth daily      cetirizine (ZyrTEC) 10 MG chewable tablet Chew 1 tablet (10 mg total) daily 30 tablet 0    ciprofloxacin-dexamethasone (CIPRODEX) otic suspension Administer 4 drops into both ears 2 (two) times a day 7 5 mL 0    ergocalciferol (VITAMIN D2) 50,000 units Take 1 capsule (50,000 Units total) by mouth once a week 4 capsule 3    lisinopril-hydrochlorothiazide (PRINZIDE,ZESTORETIC) 20-25 MG per tablet Take 1 tablet by mouth daily 90 tablet 1    loratadine (CLARITIN) 10 mg tablet Take 10 mg by mouth daily      pravastatin (PRAVACHOL) 40 mg tablet Take 1 tablet (40 mg total) by mouth daily 90 tablet 1    diphenhydrAMINE (BENADRYL) 25 mg tablet Take 25 mg by mouth daily at bedtime as needed for itching      metoprolol tartrate (LOPRESSOR) 25 mg tablet TAKE 1 TABLET BY MOUTH EVERY 12 HOURS (Patient not taking: Reported on 2/15/2021) 180 tablet 1     No current facility-administered medications for this visit  Allergies   Allergen Reactions    Other      Summertime grass, weeds       Review of Systems   Constitutional: Negative for activity change, appetite change, chills, fatigue and fever  HENT: Negative for congestion, ear pain, postnasal drip, rhinorrhea, sinus pressure, sinus pain, sore throat and trouble swallowing  Eyes: Negative for pain     Respiratory: Negative for cough, chest tightness and shortness of breath  Cardiovascular: Negative for chest pain, palpitations and leg swelling  Gastrointestinal: Negative for abdominal pain, constipation, diarrhea, nausea and vomiting  Genitourinary: Negative for difficulty urinating, flank pain, frequency and urgency  Musculoskeletal: Negative for back pain, joint swelling and myalgias  Skin: Negative for color change  Neurological: Negative for dizziness, weakness, light-headedness and headaches  Psychiatric/Behavioral: Positive for sleep disturbance  The patient is not nervous/anxious  Video Exam    Vitals:    04/07/21 1352   Weight: 121 kg (267 lb)   Height: 5' 11" (1 803 m)       Physical Exam  Constitutional:       General: He is awake  Appearance: Normal appearance  He is well-developed  HENT:      Head: Normocephalic  Nose: Nose normal       Mouth/Throat:      Mouth: Mucous membranes are moist    Eyes:      Extraocular Movements: Extraocular movements intact  Neck:      Musculoskeletal: Normal range of motion  Cardiovascular:      Rate and Rhythm: Normal rate  Pulmonary:      Effort: Pulmonary effort is normal    Abdominal:      Palpations: Abdomen is soft  Musculoskeletal: Normal range of motion  Skin:     General: Skin is warm and dry  Neurological:      Mental Status: He is alert and oriented to person, place, and time  Psychiatric:         Attention and Perception: Attention normal          Mood and Affect: Mood normal          Speech: Speech normal          Behavior: Behavior normal  Behavior is cooperative  Comments: Anxiety has improved, mild insomnia  I spent 15 minutes with patient today in which greater than 50% of the time was spent in counseling/coordination of care regarding medications, treatment plans  VIRTUAL VISIT DISCLAIMER    Abraham Lieberman acknowledges that he has consented to an online visit or consultation   He understands that the online visit is based solely on information provided by him, and that, in the absence of a face-to-face physical evaluation by the physician, the diagnosis he receives is both limited and provisional in terms of accuracy and completeness  This is not intended to replace a full medical face-to-face evaluation by the physician  Devaughn Driscoll understands and accepts these terms

## 2021-04-07 NOTE — ASSESSMENT & PLAN NOTE
· Related to COVID-19? · Will perform Covid swab 4/5/2021  · 3/8/2021: dx: with bilateral otitis media, nasal congestion, frontal sinusitis    · Will order xanax to be taken at bedtime  · Improved anxiety

## 2021-04-07 NOTE — ASSESSMENT & PLAN NOTE
· S/S started    earlier in march with otitis media and sinusitis, has continued dry mouth/throat, and insomnia  · Positive sick contacts: Berta Tapia  none  · NEGATIVE COVID-19 test on     4/5/2021  · Initially Positive for:    dry mouth, insomnia  · Initially Denies the following:    all of covid s/s: fever, chills, shortness of breath, cough, bodyaches, headache, loss of taste or smell  · Feels better just some mild congestion  · Discussed isolation/quarantine until 24 hours after all symptoms have stopped  ? If your test is positive for COVID-19 you should stay Isolated for at least 10 days since you first has symptoms  ? If your test is negative for COVID-19 you should stay Quarantined for at least 14 days from when you were exposed  · Discussed keeping active to prevent blood clots - do not just lay around  · Discussed deep breathing exercises, reposition yourself often on your side, back and stomach while you are resting/sleeping  · Reminded to change toothbrush to prevent re-contamination  · Follow up visit with me in     days  · Start taking Vitamin C 1000mg by mouth daily  · Start taking Vitamin D 2000mg by mouth daily  · Start taking Zinc 220mg by mouth daily OR a multivitamin daily   · Transmission is by respiratory secretions  · Keep a Distance of at Least 6 Feet  · Cover your Mouth and Nose AT ALL TIMES  · Wash Your Hands Often  · Signs and Symptoms May Show Up 2 to 14 Days AFTER You have Been Exposed  · Fever, Cough, Shortness of Breath  · Chills, Diarrhea, Nausea, Vomiting, Abdominal Pain  · Muscle Aches, Headaches, Dizziness  · Post Nasal Drip, Sore Throat, Loss of Taste and/or Smell  · Some or All of These Signs and Symptoms Can Last For Days, Weeks, or Even 3 to 6 Months

## 2021-04-15 ENCOUNTER — OFFICE VISIT (OUTPATIENT)
Dept: INTERNAL MEDICINE CLINIC | Facility: CLINIC | Age: 71
End: 2021-04-15
Payer: MEDICARE

## 2021-04-15 VITALS — HEIGHT: 71 IN | WEIGHT: 267 LBS | BODY MASS INDEX: 37.38 KG/M2

## 2021-04-15 DIAGNOSIS — H65.116 RECURRENT ACUTE ALLERGIC OTITIS MEDIA OF BOTH EARS: ICD-10-CM

## 2021-04-15 DIAGNOSIS — R09.82 POST-NASAL DRIP: ICD-10-CM

## 2021-04-15 DIAGNOSIS — F41.8 ANXIETY ABOUT HEALTH: ICD-10-CM

## 2021-04-15 DIAGNOSIS — G47.09 OTHER INSOMNIA: ICD-10-CM

## 2021-04-15 DIAGNOSIS — J01.11 ACUTE RECURRENT FRONTAL SINUSITIS: Primary | ICD-10-CM

## 2021-04-15 DIAGNOSIS — R09.81 NASAL CONGESTION: ICD-10-CM

## 2021-04-15 PROCEDURE — 99213 OFFICE O/P EST LOW 20 MIN: CPT | Performed by: NURSE PRACTITIONER

## 2021-04-15 RX ORDER — PREDNISONE 10 MG/1
TABLET ORAL
Qty: 85 TABLET | Refills: 0 | Status: SHIPPED | OUTPATIENT
Start: 2021-04-15 | End: 2021-05-10

## 2021-04-15 RX ORDER — DOXYCYCLINE HYCLATE 100 MG/1
100 CAPSULE ORAL EVERY 12 HOURS SCHEDULED
Qty: 28 CAPSULE | Refills: 0 | Status: SHIPPED | OUTPATIENT
Start: 2021-04-15 | End: 2021-04-29

## 2021-04-15 RX ORDER — BENZONATATE 200 MG/1
200 CAPSULE ORAL 3 TIMES DAILY PRN
Qty: 20 CAPSULE | Refills: 0 | Status: SHIPPED | OUTPATIENT
Start: 2021-04-15 | End: 2021-05-14

## 2021-04-15 RX ORDER — ALPRAZOLAM 0.25 MG/1
0.5 TABLET ORAL
Qty: 30 TABLET | Refills: 0 | Status: SHIPPED | OUTPATIENT
Start: 2021-04-15 | End: 2021-05-07 | Stop reason: SDUPTHER

## 2021-04-15 NOTE — PROGRESS NOTES
Assessment/Plan:    Problem List Items Addressed This Visit        Respiratory    Acute recurrent frontal sinusitis - Primary     · Will start doxycycline and longer prednisone taper         Relevant Medications    doxycycline hyclate (VIBRAMYCIN) 100 mg capsule    predniSONE 10 mg tablet    benzonatate (TESSALON) 200 MG capsule       Nervous and Auditory    Bilateral otitis media     · Will start doxycycline and give longer prednisone taper         Relevant Medications    doxycycline hyclate (VIBRAMYCIN) 100 mg capsule    predniSONE 10 mg tablet    benzonatate (TESSALON) 200 MG capsule       Other    Other insomnia     · Will continue xanax at increased dose         Relevant Medications    ALPRAZolam (XANAX) 0 25 mg tablet    Post-nasal drip     · Continues and is worse at night  · Start Tessalon pearls         Relevant Medications    doxycycline hyclate (VIBRAMYCIN) 100 mg capsule    predniSONE 10 mg tablet    benzonatate (TESSALON) 200 MG capsule    Nasal congestion     · Continuous  · Nasal spray has made it worse  · Doxycycline and prednisone taper         Relevant Medications    doxycycline hyclate (VIBRAMYCIN) 100 mg capsule    predniSONE 10 mg tablet    benzonatate (TESSALON) 200 MG capsule    Anxiety about health     · Continue xanax at 0 5mg po at HS         Relevant Medications    ALPRAZolam (XANAX) 0 25 mg tablet         Diagnoses and all orders for this visit:    Acute recurrent frontal sinusitis  -     doxycycline hyclate (VIBRAMYCIN) 100 mg capsule; Take 1 capsule (100 mg total) by mouth every 12 (twelve) hours for 14 days  -     predniSONE 10 mg tablet; Take 2 tablets (20 mg total) by mouth 2 (two) times a day with meals for 5 days, THEN 3 tablets (30 mg total) 2 (two) times a day with meals for 5 days, THEN 2 tablets (20 mg total) 2 (two) times a day with meals for 5 days, THEN 1 tablet (10 mg total) 2 (two) times a day with meals for 5 days, THEN 1 tablet (10 mg total) daily for 5 days    - benzonatate (TESSALON) 200 MG capsule; Take 1 capsule (200 mg total) by mouth 3 (three) times a day as needed for cough    Recurrent acute allergic otitis media of both ears  -     doxycycline hyclate (VIBRAMYCIN) 100 mg capsule; Take 1 capsule (100 mg total) by mouth every 12 (twelve) hours for 14 days  -     predniSONE 10 mg tablet; Take 2 tablets (20 mg total) by mouth 2 (two) times a day with meals for 5 days, THEN 3 tablets (30 mg total) 2 (two) times a day with meals for 5 days, THEN 2 tablets (20 mg total) 2 (two) times a day with meals for 5 days, THEN 1 tablet (10 mg total) 2 (two) times a day with meals for 5 days, THEN 1 tablet (10 mg total) daily for 5 days  -     benzonatate (TESSALON) 200 MG capsule; Take 1 capsule (200 mg total) by mouth 3 (three) times a day as needed for cough    Post-nasal drip  -     doxycycline hyclate (VIBRAMYCIN) 100 mg capsule; Take 1 capsule (100 mg total) by mouth every 12 (twelve) hours for 14 days  -     predniSONE 10 mg tablet; Take 2 tablets (20 mg total) by mouth 2 (two) times a day with meals for 5 days, THEN 3 tablets (30 mg total) 2 (two) times a day with meals for 5 days, THEN 2 tablets (20 mg total) 2 (two) times a day with meals for 5 days, THEN 1 tablet (10 mg total) 2 (two) times a day with meals for 5 days, THEN 1 tablet (10 mg total) daily for 5 days  -     benzonatate (TESSALON) 200 MG capsule; Take 1 capsule (200 mg total) by mouth 3 (three) times a day as needed for cough    Other insomnia  -     ALPRAZolam (XANAX) 0 25 mg tablet; Take 2 tablets (0 5 mg total) by mouth daily at bedtime as needed for anxiety or sleep    Nasal congestion  -     doxycycline hyclate (VIBRAMYCIN) 100 mg capsule; Take 1 capsule (100 mg total) by mouth every 12 (twelve) hours for 14 days  -     predniSONE 10 mg tablet;  Take 2 tablets (20 mg total) by mouth 2 (two) times a day with meals for 5 days, THEN 3 tablets (30 mg total) 2 (two) times a day with meals for 5 days, THEN 2 tablets (20 mg total) 2 (two) times a day with meals for 5 days, THEN 1 tablet (10 mg total) 2 (two) times a day with meals for 5 days, THEN 1 tablet (10 mg total) daily for 5 days  -     benzonatate (TESSALON) 200 MG capsule; Take 1 capsule (200 mg total) by mouth 3 (three) times a day as needed for cough    Anxiety about health  -     ALPRAZolam (XANAX) 0 25 mg tablet; Take 2 tablets (0 5 mg total) by mouth daily at bedtime as needed for anxiety or sleep     Anxiety about health  · Continue xanax at 0 5mg po at HS    Nasal congestion  · Continuous  · Nasal spray has made it worse  · Doxycycline and prednisone taper    Other insomnia  · Will continue xanax at increased dose    Post-nasal drip  · Continues and is worse at night  · Start Tessalon pearls    Bilateral otitis media  · Will start doxycycline and give longer prednisone taper    Acute recurrent frontal sinusitis  · Will start doxycycline and longer prednisone taper      Subjective:      Patient ID: Iesha Campbell is a 65 y o  male  Sinus Pain  Patient complains of congestion, cough, frequent clearing of the throat, nasal congestion, post nasal drip, sinus pressure and sore throat  Onset of symptoms was several weeks ago  Symptoms have been unchanged since that time  He is drinking plenty of fluids  Ear Pain  Patient complains of ear pain and possible ear infection  Symptoms include bilateral ear pain, cough and sore throat  Onset of symptoms was several weeks ago, and have been unchanged since that time  Associated symptoms include: congestion, headache, post nasal drip, productive cough, sinus pressure and sore throat  He is drinking plenty of fluids  Insomnia  Onset was several years ago 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  Patient describes symptoms as frequent night time awakening and non-restful sleep  Patient has found minimal relief with melatonin use  Patient has found moderate relief with xanax use   Associated symptoms include: anxiety and fatigue  Patient denies daytime somnolence, frequent nighttime urination, leg cramps and restless legs  Symptoms have been intermittent  Will start the patient on Doxycycline, Prednisone, Tessalon Pearls, and continue his Xanax  The following portions of the patient's history were reviewed and updated as appropriate:   Past Medical History:  He has a past medical history of Arthritis, Hyperlipidemia, Hypertension, and Obesity  ,  _______________________________________________________________________  Medical Problems:  does not have any pertinent problems on file ,  _______________________________________________________________________  Past Surgical History:   has a past surgical history that includes Knee surgery; Hand surgery (Bilateral); Wrist surgery; Elbow surgery; Colonoscopy; and Cardiac surgery  ,  _______________________________________________________________________  Family History:  family history includes Diabetes in his sister; Heart disease in his father and paternal grandmother; Kidney disease in his sister; Lupus in his mother; Rheum arthritis in his mother ,  _______________________________________________________________________  Social History:   reports that he has quit smoking  His smoking use included cigarettes  He quit after 10 00 years of use  He has never used smokeless tobacco  He reports current alcohol use  He reports that he does not use drugs  ,  _______________________________________________________________________  Allergies:  is allergic to other     _______________________________________________________________________  Current Outpatient Medications   Medication Sig Dispense Refill    ALPRAZolam (XANAX) 0 25 mg tablet Take 2 tablets (0 5 mg total) by mouth daily at bedtime as needed for anxiety or sleep 30 tablet 0    aspirin 81 MG tablet Take 81 mg by mouth daily      ciprofloxacin-dexamethasone (CIPRODEX) otic suspension Administer 4 drops into both ears 2 (two) times a day 7 5 mL 0    diphenhydrAMINE (BENADRYL) 25 mg tablet Take 25 mg by mouth daily at bedtime as needed for itching      ergocalciferol (VITAMIN D2) 50,000 units Take 1 capsule (50,000 Units total) by mouth once a week 4 capsule 3    lisinopril-hydrochlorothiazide (PRINZIDE,ZESTORETIC) 20-25 MG per tablet Take 1 tablet by mouth daily 90 tablet 1    metoprolol tartrate (LOPRESSOR) 25 mg tablet TAKE 1 TABLET BY MOUTH EVERY 12 HOURS 180 tablet 1    pravastatin (PRAVACHOL) 40 mg tablet Take 1 tablet (40 mg total) by mouth daily 90 tablet 1    benzonatate (TESSALON) 200 MG capsule Take 1 capsule (200 mg total) by mouth 3 (three) times a day as needed for cough 20 capsule 0    cetirizine (ZyrTEC) 10 MG chewable tablet Chew 1 tablet (10 mg total) daily 30 tablet 0    doxycycline hyclate (VIBRAMYCIN) 100 mg capsule Take 1 capsule (100 mg total) by mouth every 12 (twelve) hours for 14 days 28 capsule 0    loratadine (CLARITIN) 10 mg tablet Take 10 mg by mouth daily      predniSONE 10 mg tablet Take 2 tablets (20 mg total) by mouth 2 (two) times a day with meals for 5 days, THEN 3 tablets (30 mg total) 2 (two) times a day with meals for 5 days, THEN 2 tablets (20 mg total) 2 (two) times a day with meals for 5 days, THEN 1 tablet (10 mg total) 2 (two) times a day with meals for 5 days, THEN 1 tablet (10 mg total) daily for 5 days  85 tablet 0     No current facility-administered medications for this visit       _______________________________________________________________________  Review of Systems   Constitutional: Negative for activity change, chills, fatigue and fever  HENT: Positive for ear pain, postnasal drip, rhinorrhea, sinus pressure, sinus pain and sore throat  Eyes: Negative for pain  Respiratory: Positive for cough  Negative for shortness of breath  Cardiovascular: Negative for chest pain, palpitations and leg swelling     Gastrointestinal: Negative for abdominal pain, constipation, diarrhea, nausea and vomiting  Genitourinary: Negative for difficulty urinating, flank pain, frequency and urgency  Musculoskeletal: Negative for gait problem, joint swelling and myalgias  Skin: Negative for color change  Neurological: Negative for dizziness, weakness, light-headedness and headaches  Psychiatric/Behavioral: Positive for sleep disturbance  The patient is not nervous/anxious  All other systems reviewed and are negative  Objective:  Vitals:    04/15/21 1024   Weight: 121 kg (267 lb)   Height: 5' 11" (1 803 m)     Body mass index is 37 24 kg/m²  Physical Exam  Vitals signs reviewed  Constitutional:       General: He is awake  Appearance: Normal appearance  He is well-developed  HENT:      Head: Normocephalic and atraumatic  Comments: Sore throat, cough, post nasal drip     Right Ear: Tympanic membrane is erythematous  Left Ear: Tympanic membrane is erythematous  Nose: Nose normal       Mouth/Throat:      Mouth: Mucous membranes are moist    Eyes:      Extraocular Movements: Extraocular movements intact  Neck:      Musculoskeletal: Normal range of motion  Cardiovascular:      Rate and Rhythm: Normal rate and regular rhythm  Pulses: Normal pulses  Heart sounds: Normal heart sounds  Pulmonary:      Effort: Pulmonary effort is normal       Breath sounds: Normal breath sounds  Abdominal:      General: Bowel sounds are normal       Palpations: Abdomen is soft  Musculoskeletal: Normal range of motion  Right lower leg: No edema  Left lower leg: No edema  Skin:     General: Skin is warm and dry  Neurological:      Mental Status: He is alert and oriented to person, place, and time  Psychiatric:         Attention and Perception: Attention normal          Mood and Affect: Mood normal          Speech: Speech normal          Behavior: Behavior normal  Behavior is cooperative        Comments: Issues with insomnia PHQ-9 Depression Screening    PHQ-9:   Frequency of the following problems over the past two weeks:

## 2021-05-03 ENCOUNTER — IMMUNIZATIONS (OUTPATIENT)
Dept: FAMILY MEDICINE CLINIC | Facility: HOSPITAL | Age: 71
End: 2021-05-03

## 2021-05-03 DIAGNOSIS — Z23 ENCOUNTER FOR IMMUNIZATION: Primary | ICD-10-CM

## 2021-05-03 PROCEDURE — 91301 SARS-COV-2 / COVID-19 MRNA VACCINE (MODERNA) 100 MCG: CPT

## 2021-05-03 PROCEDURE — 0012A SARS-COV-2 / COVID-19 MRNA VACCINE (MODERNA) 100 MCG: CPT

## 2021-05-07 DIAGNOSIS — F41.8 ANXIETY ABOUT HEALTH: ICD-10-CM

## 2021-05-07 DIAGNOSIS — G47.09 OTHER INSOMNIA: ICD-10-CM

## 2021-05-10 RX ORDER — ALPRAZOLAM 0.25 MG/1
0.5 TABLET ORAL
Qty: 30 TABLET | Refills: 0 | Status: SHIPPED | OUTPATIENT
Start: 2021-05-10 | End: 2021-07-02 | Stop reason: SDUPTHER

## 2021-05-14 ENCOUNTER — OFFICE VISIT (OUTPATIENT)
Dept: INTERNAL MEDICINE CLINIC | Facility: CLINIC | Age: 71
End: 2021-05-14
Payer: MEDICARE

## 2021-05-14 VITALS
RESPIRATION RATE: 16 BRPM | HEIGHT: 71 IN | TEMPERATURE: 97.5 F | DIASTOLIC BLOOD PRESSURE: 62 MMHG | BODY MASS INDEX: 36.68 KG/M2 | SYSTOLIC BLOOD PRESSURE: 112 MMHG | WEIGHT: 262 LBS | OXYGEN SATURATION: 97 % | HEART RATE: 80 BPM

## 2021-05-14 DIAGNOSIS — G47.09 OTHER INSOMNIA: ICD-10-CM

## 2021-05-14 DIAGNOSIS — R06.00 DYSPNEA ON EXERTION: ICD-10-CM

## 2021-05-14 DIAGNOSIS — R94.39 ABNORMAL STRESS ECHO: ICD-10-CM

## 2021-05-14 DIAGNOSIS — R73.01 ELEVATED FASTING GLUCOSE: ICD-10-CM

## 2021-05-14 DIAGNOSIS — I10 HYPERTENSION, UNSPECIFIED TYPE: Primary | ICD-10-CM

## 2021-05-14 DIAGNOSIS — I10 ESSENTIAL HYPERTENSION: ICD-10-CM

## 2021-05-14 PROBLEM — H66.93 BILATERAL OTITIS MEDIA: Status: RESOLVED | Noted: 2021-03-08 | Resolved: 2021-05-14

## 2021-05-14 PROBLEM — R09.82 POST-NASAL DRIP: Status: RESOLVED | Noted: 2020-01-30 | Resolved: 2021-05-14

## 2021-05-14 PROBLEM — R09.81 NASAL CONGESTION: Status: RESOLVED | Noted: 2021-03-08 | Resolved: 2021-05-14

## 2021-05-14 LAB — SL AMB POCT HEMOGLOBIN AIC: 6 (ref ?–6.5)

## 2021-05-14 PROCEDURE — 83036 HEMOGLOBIN GLYCOSYLATED A1C: CPT | Performed by: PHYSICIAN ASSISTANT

## 2021-05-14 PROCEDURE — 99213 OFFICE O/P EST LOW 20 MIN: CPT | Performed by: PHYSICIAN ASSISTANT

## 2021-05-14 PROCEDURE — 93000 ELECTROCARDIOGRAM COMPLETE: CPT | Performed by: PHYSICIAN ASSISTANT

## 2021-05-14 RX ORDER — ZOLPIDEM TARTRATE 12.5 MG/1
12.5 TABLET, FILM COATED, EXTENDED RELEASE ORAL
Qty: 30 TABLET | Refills: 0 | Status: SHIPPED | OUTPATIENT
Start: 2021-05-14 | End: 2021-06-14 | Stop reason: SDUPTHER

## 2021-05-14 RX ORDER — LISINOPRIL AND HYDROCHLOROTHIAZIDE 25; 20 MG/1; MG/1
1 TABLET ORAL DAILY
Qty: 90 TABLET | Refills: 1 | Status: SHIPPED | OUTPATIENT
Start: 2021-05-14 | End: 2021-09-14

## 2021-05-14 NOTE — ASSESSMENT & PLAN NOTE
Pts symptoms are stable with current regime  No changes at present     EKG Performed and shows NSR with RBBB and no changes compared to 2018

## 2021-05-14 NOTE — PROGRESS NOTES
Assessment/Plan:  Problem List Items Addressed This Visit        Cardiovascular and Mediastinum    Hypertension    Relevant Medications    metoprolol tartrate (LOPRESSOR) 25 mg tablet    lisinopril-hydrochlorothiazide (PRINZIDE,ZESTORETIC) 20-25 MG per tablet       Other    Other insomnia    Relevant Medications    zolpidem (AMBIEN CR) 12 5 MG CR tablet    Abnormal stress echo    Relevant Medications    metoprolol tartrate (LOPRESSOR) 25 mg tablet    Elevated fasting glucose - Primary    Relevant Orders    POCT hemoglobin A1c (Completed)      Other Visit Diagnoses     Dyspnea on exertion        Relevant Medications    metoprolol tartrate (LOPRESSOR) 25 mg tablet           Diagnoses and all orders for this visit:    Elevated fasting glucose  -     POCT hemoglobin A1c    Abnormal stress echo  -     metoprolol tartrate (LOPRESSOR) 25 mg tablet; Take 1 tablet (25 mg total) by mouth every 12 (twelve) hours    Essential hypertension  -     metoprolol tartrate (LOPRESSOR) 25 mg tablet; Take 1 tablet (25 mg total) by mouth every 12 (twelve) hours    Dyspnea on exertion  -     metoprolol tartrate (LOPRESSOR) 25 mg tablet; Take 1 tablet (25 mg total) by mouth every 12 (twelve) hours    Hypertension, unspecified type  -     lisinopril-hydrochlorothiazide (PRINZIDE,ZESTORETIC) 20-25 MG per tablet; Take 1 tablet by mouth daily    Other insomnia  -     zolpidem (AMBIEN CR) 12 5 MG CR tablet; Take 1 tablet (12 5 mg total) by mouth daily at bedtime as needed for sleep        No problem-specific Assessment & Plan notes found for this encounter  Subjective:      Patient ID: Iesha Campbell is a 65 y o  male  Pt presents for routine visit  He is doing well overall  He is due for A1C, which is stable at 6 0  He had full covid vaccination  His labs and CRC screening are up to date  He has a hx of CAD requiring catheterization in 2018  Since then he has been concerned about his heart health   BP remains at goal  EKG in office shows NSR with RBBB and is unchanged compared to 2018      The following portions of the patient's history were reviewed and updated as appropriate:   He has a past medical history of Arthritis, Hyperlipidemia, Hypertension, and Obesity  ,  does not have any pertinent problems on file  ,   has a past surgical history that includes Knee surgery; Hand surgery (Bilateral); Wrist surgery; Elbow surgery; Colonoscopy; and Cardiac surgery  ,  family history includes Diabetes in his sister; Heart disease in his father and paternal grandmother; Kidney disease in his sister; Lupus in his mother; Rheum arthritis in his mother  ,   reports that he has quit smoking  His smoking use included cigarettes  He quit after 10 00 years of use  He has never used smokeless tobacco  He reports current alcohol use  He reports that he does not use drugs  ,  is allergic to other     Current Outpatient Medications   Medication Sig Dispense Refill    ALPRAZolam (XANAX) 0 25 mg tablet Take 2 tablets (0 5 mg total) by mouth daily at bedtime as needed for anxiety or sleep 30 tablet 0    aspirin 81 MG tablet Take 81 mg by mouth daily      diphenhydrAMINE (BENADRYL) 25 mg tablet Take 25 mg by mouth daily at bedtime as needed for itching      ergocalciferol (VITAMIN D2) 50,000 units Take 1 capsule (50,000 Units total) by mouth once a week 4 capsule 3    lisinopril-hydrochlorothiazide (PRINZIDE,ZESTORETIC) 20-25 MG per tablet Take 1 tablet by mouth daily 90 tablet 1    loratadine (CLARITIN) 10 mg tablet Take 10 mg by mouth daily      metoprolol tartrate (LOPRESSOR) 25 mg tablet Take 1 tablet (25 mg total) by mouth every 12 (twelve) hours 180 tablet 1    pravastatin (PRAVACHOL) 40 mg tablet Take 1 tablet (40 mg total) by mouth daily 90 tablet 1    zolpidem (AMBIEN CR) 12 5 MG CR tablet Take 1 tablet (12 5 mg total) by mouth daily at bedtime as needed for sleep 30 tablet 0     No current facility-administered medications for this visit          Review of Systems   Constitutional: Negative for chills and fever  HENT: Negative for congestion, ear pain, hearing loss, postnasal drip, rhinorrhea, sinus pressure, sinus pain, sore throat and trouble swallowing  Eyes: Negative for pain and visual disturbance  Respiratory: Negative for cough, chest tightness, shortness of breath and wheezing  Cardiovascular: Negative  Negative for chest pain, palpitations and leg swelling  Gastrointestinal: Negative for abdominal pain, blood in stool, constipation, diarrhea, nausea and vomiting  Endocrine: Negative for cold intolerance, heat intolerance, polydipsia, polyphagia and polyuria  Genitourinary: Negative for difficulty urinating, dysuria, flank pain and urgency  Musculoskeletal: Negative for arthralgias, back pain, gait problem and myalgias  Skin: Negative for rash  Allergic/Immunologic: Negative  Neurological: Negative for dizziness, weakness, light-headedness and headaches  Hematological: Negative  Psychiatric/Behavioral: Negative for behavioral problems, dysphoric mood and sleep disturbance  The patient is not nervous/anxious  PHQ-9 Depression Screening    PHQ-9:   Frequency of the following problems over the past two weeks:              Objective:  Vitals:    05/14/21 0806   BP: 112/62   Pulse: 80   Resp: 16   Temp: 97 5 °F (36 4 °C)   TempSrc: Tympanic   SpO2: 97%   Weight: 119 kg (262 lb)   Height: 5' 11" (1 803 m)     Body mass index is 36 54 kg/m²  Physical Exam  Constitutional:       General: He is not in acute distress  Appearance: He is well-developed  He is not diaphoretic  HENT:      Head: Normocephalic and atraumatic  Right Ear: External ear normal       Left Ear: External ear normal       Nose: Nose normal       Mouth/Throat:      Pharynx: No oropharyngeal exudate  Eyes:      General: No scleral icterus  Right eye: No discharge  Left eye: No discharge        Conjunctiva/sclera: Conjunctivae normal       Pupils: Pupils are equal, round, and reactive to light  Neck:      Musculoskeletal: Normal range of motion and neck supple  Thyroid: No thyromegaly  Cardiovascular:      Rate and Rhythm: Normal rate and regular rhythm  Heart sounds: Normal heart sounds  No murmur  No friction rub  No gallop  Pulmonary:      Effort: Pulmonary effort is normal  No respiratory distress  Breath sounds: Normal breath sounds  No wheezing or rales  Abdominal:      General: Bowel sounds are normal  There is no distension  Palpations: Abdomen is soft  Tenderness: There is no abdominal tenderness  Musculoskeletal: Normal range of motion  General: No tenderness or deformity  Skin:     General: Skin is warm and dry  Neurological:      Mental Status: He is alert and oriented to person, place, and time  Cranial Nerves: No cranial nerve deficit  Psychiatric:         Behavior: Behavior normal          Thought Content:  Thought content normal          Judgment: Judgment normal

## 2021-06-14 DIAGNOSIS — G47.09 OTHER INSOMNIA: ICD-10-CM

## 2021-06-14 RX ORDER — ZOLPIDEM TARTRATE 12.5 MG/1
12.5 TABLET, FILM COATED, EXTENDED RELEASE ORAL
Qty: 30 TABLET | Refills: 0 | Status: SHIPPED | OUTPATIENT
Start: 2021-06-14 | End: 2021-07-13 | Stop reason: SDUPTHER

## 2021-06-16 ENCOUNTER — TELEPHONE (OUTPATIENT)
Dept: INTERNAL MEDICINE CLINIC | Facility: CLINIC | Age: 71
End: 2021-06-16

## 2021-07-02 ENCOUNTER — OFFICE VISIT (OUTPATIENT)
Dept: INTERNAL MEDICINE CLINIC | Facility: CLINIC | Age: 71
End: 2021-07-02
Payer: MEDICARE

## 2021-07-02 VITALS
OXYGEN SATURATION: 96 % | HEIGHT: 71 IN | TEMPERATURE: 98.1 F | SYSTOLIC BLOOD PRESSURE: 118 MMHG | DIASTOLIC BLOOD PRESSURE: 76 MMHG | WEIGHT: 256.38 LBS | BODY MASS INDEX: 35.89 KG/M2 | HEART RATE: 104 BPM

## 2021-07-02 DIAGNOSIS — G47.09 OTHER INSOMNIA: ICD-10-CM

## 2021-07-02 DIAGNOSIS — J30.2 SEASONAL ALLERGIES: Primary | ICD-10-CM

## 2021-07-02 DIAGNOSIS — F41.8 ANXIETY ABOUT HEALTH: ICD-10-CM

## 2021-07-02 PROCEDURE — 96372 THER/PROPH/DIAG INJ SC/IM: CPT | Performed by: PHYSICIAN ASSISTANT

## 2021-07-02 PROCEDURE — 99214 OFFICE O/P EST MOD 30 MIN: CPT | Performed by: PHYSICIAN ASSISTANT

## 2021-07-02 RX ORDER — ALPRAZOLAM 0.25 MG/1
0.5 TABLET ORAL
Qty: 30 TABLET | Refills: 0 | Status: SHIPPED | OUTPATIENT
Start: 2021-07-02 | End: 2021-08-11 | Stop reason: SDUPTHER

## 2021-07-02 RX ORDER — FLUTICASONE PROPIONATE 50 MCG
1 SPRAY, SUSPENSION (ML) NASAL DAILY
Qty: 16 G | Refills: 2 | Status: SHIPPED | OUTPATIENT
Start: 2021-07-02 | End: 2021-07-29 | Stop reason: SDUPTHER

## 2021-07-02 RX ORDER — TRIAMCINOLONE ACETONIDE 40 MG/ML
40 INJECTION, SUSPENSION INTRA-ARTICULAR; INTRAMUSCULAR ONCE
Status: COMPLETED | OUTPATIENT
Start: 2021-07-02 | End: 2021-07-02

## 2021-07-02 RX ADMIN — TRIAMCINOLONE ACETONIDE 40 MG: 40 INJECTION, SUSPENSION INTRA-ARTICULAR; INTRAMUSCULAR at 15:24

## 2021-07-02 NOTE — PROGRESS NOTES
Assessment/Plan:  Problem List Items Addressed This Visit        Other    Other insomnia    Relevant Medications    ALPRAZolam (XANAX) 0 25 mg tablet    Anxiety about health    Relevant Medications    ALPRAZolam (XANAX) 0 25 mg tablet    Seasonal allergies - Primary     Start flonase  Will give kenalog IM to combat symptoms as well  Call if symptoms fail to improve  Relevant Medications    fluticasone (FLONASE) 50 mcg/act nasal spray           Diagnoses and all orders for this visit:    Seasonal allergies  -     fluticasone (FLONASE) 50 mcg/act nasal spray; 1 spray into each nostril daily    Anxiety about health  -     ALPRAZolam (XANAX) 0 25 mg tablet; Take 2 tablets (0 5 mg total) by mouth daily at bedtime as needed for anxiety or sleep    Other insomnia  -     ALPRAZolam (XANAX) 0 25 mg tablet; Take 2 tablets (0 5 mg total) by mouth daily at bedtime as needed for anxiety or sleep        Seasonal allergies  Start flonase  Will give kenalog IM to combat symptoms as well  Call if symptoms fail to improve  Subjective:      Patient ID: Silva Herrmann is a 65 y o  male  Pt presents for evaluation of worsening seasonal allergies  He notes nasal congestion, runny nose, post nasal drip and dry cough  He has a hard time sleeping due to these symptoms  He had been given 2 different abx earlier this year without improvement  He has been using Allegra and saline with temporary improvement  The following portions of the patient's history were reviewed and updated as appropriate:   He has a past medical history of Arthritis, Hyperlipidemia, Hypertension, and Obesity  ,  does not have any pertinent problems on file  ,   has a past surgical history that includes Knee surgery; Hand surgery (Bilateral); Wrist surgery; Elbow surgery; Colonoscopy; and Cardiac surgery  ,  family history includes Diabetes in his sister;  Heart disease in his father and paternal grandmother; Kidney disease in his sister; Lupus in his mother; Rheum arthritis in his mother  ,   reports that he has quit smoking  His smoking use included cigarettes  He quit after 10 00 years of use  He has never used smokeless tobacco  He reports current alcohol use  He reports that he does not use drugs  ,  is allergic to other     Current Outpatient Medications   Medication Sig Dispense Refill    ALPRAZolam (XANAX) 0 25 mg tablet Take 2 tablets (0 5 mg total) by mouth daily at bedtime as needed for anxiety or sleep 30 tablet 0    aspirin 81 MG tablet Take 81 mg by mouth daily      ergocalciferol (VITAMIN D2) 50,000 units Take 1 capsule (50,000 Units total) by mouth once a week 4 capsule 3    lisinopril-hydrochlorothiazide (PRINZIDE,ZESTORETIC) 20-25 MG per tablet Take 1 tablet by mouth daily 90 tablet 1    metoprolol tartrate (LOPRESSOR) 25 mg tablet Take 1 tablet (25 mg total) by mouth every 12 (twelve) hours 180 tablet 1    pravastatin (PRAVACHOL) 40 mg tablet Take 1 tablet (40 mg total) by mouth daily 90 tablet 1    zolpidem (AMBIEN CR) 12 5 MG CR tablet Take 1 tablet (12 5 mg total) by mouth daily at bedtime as needed for sleep 30 tablet 0    diphenhydrAMINE (BENADRYL) 25 mg tablet Take 25 mg by mouth daily at bedtime as needed for itching      fluticasone (FLONASE) 50 mcg/act nasal spray 1 spray into each nostril daily 16 g 2     No current facility-administered medications for this visit  Review of Systems   Constitutional: Negative for chills and fever  HENT: Positive for congestion, postnasal drip, rhinorrhea, sinus pain and trouble swallowing  Negative for ear pain, hearing loss, sinus pressure and sore throat  Eyes: Negative for pain and visual disturbance  Respiratory: Positive for cough  Negative for chest tightness, shortness of breath and wheezing  Cardiovascular: Negative  Negative for chest pain, palpitations and leg swelling     Gastrointestinal: Negative for abdominal pain, blood in stool, constipation, diarrhea, nausea and vomiting  Endocrine: Negative for cold intolerance, heat intolerance, polydipsia, polyphagia and polyuria  Genitourinary: Negative for difficulty urinating, dysuria, flank pain and urgency  Musculoskeletal: Negative for arthralgias, back pain, gait problem and myalgias  Skin: Negative for rash  Allergic/Immunologic: Negative  Neurological: Negative for dizziness, weakness, light-headedness and headaches  Hematological: Negative  Psychiatric/Behavioral: Negative for behavioral problems, dysphoric mood and sleep disturbance  The patient is not nervous/anxious  PHQ-9 Depression Screening    PHQ-9:   Frequency of the following problems over the past two weeks:              Objective:  Vitals:    07/02/21 1428   BP: 118/76   Pulse: 104   Temp: 98 1 °F (36 7 °C)   SpO2: 96%   Weight: 116 kg (256 lb 6 oz)   Height: 5' 11" (1 803 m)     Body mass index is 35 76 kg/m²  Physical Exam  Constitutional:       General: He is not in acute distress  Appearance: He is well-developed  He is not diaphoretic  HENT:      Head: Normocephalic and atraumatic  Right Ear: External ear normal       Left Ear: External ear normal       Nose: Congestion and rhinorrhea present  Right Turbinates: Enlarged and swollen  Left Turbinates: Enlarged and swollen  Mouth/Throat:      Pharynx: No oropharyngeal exudate  Eyes:      General: No scleral icterus  Right eye: No discharge  Left eye: No discharge  Conjunctiva/sclera: Conjunctivae normal       Pupils: Pupils are equal, round, and reactive to light  Neck:      Thyroid: No thyromegaly  Cardiovascular:      Rate and Rhythm: Normal rate and regular rhythm  Heart sounds: Normal heart sounds  No murmur heard  No friction rub  No gallop  Pulmonary:      Effort: Pulmonary effort is normal  No respiratory distress  Breath sounds: Normal breath sounds  No wheezing or rales     Abdominal:      General: Bowel sounds are normal  There is no distension  Palpations: Abdomen is soft  Tenderness: There is no abdominal tenderness  Musculoskeletal:         General: No tenderness or deformity  Normal range of motion  Cervical back: Normal range of motion and neck supple  Skin:     General: Skin is warm and dry  Neurological:      Mental Status: He is alert and oriented to person, place, and time  Cranial Nerves: No cranial nerve deficit  Psychiatric:         Behavior: Behavior normal          Thought Content:  Thought content normal          Judgment: Judgment normal

## 2021-07-09 ENCOUNTER — TELEPHONE (OUTPATIENT)
Dept: INTERNAL MEDICINE CLINIC | Facility: CLINIC | Age: 71
End: 2021-07-09

## 2021-07-09 NOTE — TELEPHONE ENCOUNTER
Pt states his allergies are getting worse, its moved into his chest, the flonase is working a little

## 2021-07-09 NOTE — TELEPHONE ENCOUNTER
Pt aware of message  He will give the flonase more time to work and let us know if he wants to see ENT

## 2021-07-13 DIAGNOSIS — G47.09 OTHER INSOMNIA: ICD-10-CM

## 2021-07-13 RX ORDER — ZOLPIDEM TARTRATE 12.5 MG/1
12.5 TABLET, FILM COATED, EXTENDED RELEASE ORAL
Qty: 30 TABLET | Refills: 0 | Status: SHIPPED | OUTPATIENT
Start: 2021-07-13 | End: 2021-08-11 | Stop reason: SDUPTHER

## 2021-07-29 DIAGNOSIS — J30.2 SEASONAL ALLERGIES: ICD-10-CM

## 2021-07-29 RX ORDER — FLUTICASONE PROPIONATE 50 MCG
1 SPRAY, SUSPENSION (ML) NASAL DAILY
Qty: 16 G | Refills: 2 | Status: SHIPPED | OUTPATIENT
Start: 2021-07-29 | End: 2021-08-04 | Stop reason: SDUPTHER

## 2021-08-03 ENCOUNTER — TELEPHONE (OUTPATIENT)
Dept: INTERNAL MEDICINE CLINIC | Facility: CLINIC | Age: 71
End: 2021-08-03

## 2021-08-04 DIAGNOSIS — J30.2 SEASONAL ALLERGIES: ICD-10-CM

## 2021-08-04 RX ORDER — FLUTICASONE PROPIONATE 50 MCG
1 SPRAY, SUSPENSION (ML) NASAL 2 TIMES DAILY
Qty: 16 G | Refills: 5 | Status: SHIPPED | OUTPATIENT
Start: 2021-08-04 | End: 2022-01-18

## 2021-08-11 DIAGNOSIS — F41.8 ANXIETY ABOUT HEALTH: ICD-10-CM

## 2021-08-11 DIAGNOSIS — G47.09 OTHER INSOMNIA: ICD-10-CM

## 2021-08-11 RX ORDER — ALPRAZOLAM 0.25 MG/1
0.5 TABLET ORAL
Qty: 60 TABLET | Refills: 0 | Status: SHIPPED | OUTPATIENT
Start: 2021-08-11 | End: 2022-05-26 | Stop reason: SDUPTHER

## 2021-08-11 RX ORDER — ZOLPIDEM TARTRATE 12.5 MG/1
12.5 TABLET, FILM COATED, EXTENDED RELEASE ORAL
Qty: 30 TABLET | Refills: 0 | Status: SHIPPED | OUTPATIENT
Start: 2021-08-11 | End: 2021-09-10 | Stop reason: SDUPTHER

## 2021-08-27 DIAGNOSIS — E78.5 HYPERLIPIDEMIA, UNSPECIFIED HYPERLIPIDEMIA TYPE: ICD-10-CM

## 2021-08-27 RX ORDER — PRAVASTATIN SODIUM 40 MG
40 TABLET ORAL DAILY
Qty: 90 TABLET | Refills: 1 | Status: SHIPPED | OUTPATIENT
Start: 2021-08-27 | End: 2022-02-28 | Stop reason: SDUPTHER

## 2021-09-10 DIAGNOSIS — G47.09 OTHER INSOMNIA: ICD-10-CM

## 2021-09-10 RX ORDER — ZOLPIDEM TARTRATE 12.5 MG/1
12.5 TABLET, FILM COATED, EXTENDED RELEASE ORAL
Qty: 30 TABLET | Refills: 0 | Status: SHIPPED | OUTPATIENT
Start: 2021-09-10 | End: 2021-10-11 | Stop reason: SDUPTHER

## 2021-09-10 NOTE — TELEPHONE ENCOUNTER
Pt is requesting a refill on Ambien CR 12 [id number], please send to [address] [facility], [place]

## 2021-09-14 ENCOUNTER — OFFICE VISIT (OUTPATIENT)
Dept: INTERNAL MEDICINE CLINIC | Facility: CLINIC | Age: 71
End: 2021-09-14
Payer: MEDICARE

## 2021-09-14 VITALS
OXYGEN SATURATION: 98 % | HEART RATE: 71 BPM | HEIGHT: 71 IN | TEMPERATURE: 97.8 F | RESPIRATION RATE: 16 BRPM | SYSTOLIC BLOOD PRESSURE: 124 MMHG | DIASTOLIC BLOOD PRESSURE: 78 MMHG | WEIGHT: 252 LBS | BODY MASS INDEX: 35.28 KG/M2

## 2021-09-14 DIAGNOSIS — Z12.5 SCREENING PSA (PROSTATE SPECIFIC ANTIGEN): ICD-10-CM

## 2021-09-14 DIAGNOSIS — Z13.1 SCREENING FOR DIABETES MELLITUS: ICD-10-CM

## 2021-09-14 DIAGNOSIS — R73.01 ELEVATED FASTING GLUCOSE: ICD-10-CM

## 2021-09-14 DIAGNOSIS — E55.9 VITAMIN D DEFICIENCY: ICD-10-CM

## 2021-09-14 DIAGNOSIS — Z23 ENCOUNTER FOR VACCINATION: ICD-10-CM

## 2021-09-14 DIAGNOSIS — E78.2 MIXED HYPERLIPIDEMIA: ICD-10-CM

## 2021-09-14 DIAGNOSIS — I10 ESSENTIAL HYPERTENSION: Primary | ICD-10-CM

## 2021-09-14 DIAGNOSIS — Z13.0 SCREENING FOR DEFICIENCY ANEMIA: ICD-10-CM

## 2021-09-14 DIAGNOSIS — Z13.29 SCREENING FOR THYROID DISORDER: ICD-10-CM

## 2021-09-14 PROBLEM — Z20.822 COVID-19 RULED OUT: Status: RESOLVED | Noted: 2021-04-05 | Resolved: 2021-09-14

## 2021-09-14 PROBLEM — J01.11 ACUTE RECURRENT FRONTAL SINUSITIS: Status: RESOLVED | Noted: 2019-05-13 | Resolved: 2021-09-14

## 2021-09-14 PROCEDURE — 90662 IIV NO PRSV INCREASED AG IM: CPT | Performed by: PHYSICIAN ASSISTANT

## 2021-09-14 PROCEDURE — G0008 ADMIN INFLUENZA VIRUS VAC: HCPCS | Performed by: PHYSICIAN ASSISTANT

## 2021-09-14 PROCEDURE — 99214 OFFICE O/P EST MOD 30 MIN: CPT | Performed by: PHYSICIAN ASSISTANT

## 2021-09-14 RX ORDER — LOSARTAN POTASSIUM AND HYDROCHLOROTHIAZIDE 25; 100 MG/1; MG/1
1 TABLET ORAL DAILY
Qty: 90 TABLET | Refills: 3 | Status: SHIPPED | OUTPATIENT
Start: 2021-09-14

## 2021-09-14 NOTE — PROGRESS NOTES
Assessment/Plan:  Problem List Items Addressed This Visit        Cardiovascular and Mediastinum    Hypertension - Primary     Stable but ACE may be contributing to pts chronic cough  Will switch to losartan  Directions for use and possible side effects discussed and patient verbalized understanding of these  Relevant Medications    losartan-hydrochlorothiazide (HYZAAR) 100-25 MG per tablet    Other Relevant Orders    Comprehensive metabolic panel       Other    Hyperlipidemia    Relevant Orders    Lipid panel    Elevated fasting glucose    Relevant Orders    Hemoglobin A1C      Other Visit Diagnoses     Screening for thyroid disorder        Relevant Orders    TSH, 3rd generation with Free T4 reflex    Screening for deficiency anemia        Relevant Orders    CBC and differential    Vitamin D deficiency        Relevant Orders    Vitamin D 25 hydroxy    Screening PSA (prostate specific antigen)        Relevant Orders    PSA, Total Screen    Screening for diabetes mellitus        Relevant Orders    Hemoglobin A1C    Encounter for vaccination        Relevant Orders    influenza vaccine, high-dose, PF 0 7 mL (FLUZONE HIGH-DOSE) (Completed)           Diagnoses and all orders for this visit:    Essential hypertension  -     Comprehensive metabolic panel; Future  -     losartan-hydrochlorothiazide (HYZAAR) 100-25 MG per tablet; Take 1 tablet by mouth daily    Mixed hyperlipidemia  -     Lipid panel; Future    Screening for thyroid disorder  -     TSH, 3rd generation with Free T4 reflex; Future    Screening for deficiency anemia  -     CBC and differential; Future    Vitamin D deficiency  -     Vitamin D 25 hydroxy; Future    Screening PSA (prostate specific antigen)  -     PSA, Total Screen;  Future    Screening for diabetes mellitus  -     Hemoglobin A1C; Future    Elevated fasting glucose   -     Hemoglobin A1C; Future    Encounter for vaccination  -     influenza vaccine, high-dose, PF 0 7 mL (FLUZONE HIGH-DOSE)    Other orders  -     ascorbic acid (VITAMIN C) 250 MG CHEW; Chew 250 mg daily        Hypertension  Stable but ACE may be contributing to pts chronic cough  Will switch to losartan  Directions for use and possible side effects discussed and patient verbalized understanding of these  Subjective:      Patient ID: Rosey Helton is a 65 y o  male  Pt presents for routine visit  He is doing well overall  He is covid vaccinated and received his flu vaccine today as well  He is due for labs  CRC screening and aWV are up to date  BP is well controlled  He believes his chronic cough and PND may be due to lisinopril use or at least worsened by it  He would be agreeable to trying an alternative  The following portions of the patient's history were reviewed and updated as appropriate:   He has a past medical history of Arthritis, Hyperlipidemia, Hypertension, and Obesity  ,  does not have any pertinent problems on file  ,   has a past surgical history that includes Knee surgery; Hand surgery (Bilateral); Wrist surgery; Elbow surgery; Colonoscopy; and Cardiac surgery  ,  family history includes Diabetes in his sister; Heart disease in his father and paternal grandmother; Kidney disease in his sister; Lupus in his mother; Rheum arthritis in his mother  ,   reports that he has quit smoking  His smoking use included cigarettes  He quit after 10 00 years of use  He has never used smokeless tobacco  He reports current alcohol use  He reports that he does not use drugs  ,  is allergic to other     Current Outpatient Medications   Medication Sig Dispense Refill    ALPRAZolam (XANAX) 0 25 mg tablet Take 2 tablets (0 5 mg total) by mouth daily at bedtime as needed for anxiety or sleep 60 tablet 0    ascorbic acid (VITAMIN C) 250 MG CHEW Chew 250 mg daily      aspirin 81 MG tablet Take 81 mg by mouth daily      fluticasone (FLONASE) 50 mcg/act nasal spray 1 spray into each nostril 2 (two) times a day 16 g 5  metoprolol tartrate (LOPRESSOR) 25 mg tablet Take 1 tablet (25 mg total) by mouth every 12 (twelve) hours (Patient taking differently: Take 25 mg by mouth daily ) 180 tablet 1    pravastatin (PRAVACHOL) 40 mg tablet Take 1 tablet (40 mg total) by mouth daily 90 tablet 1    zolpidem (AMBIEN CR) 12 5 MG CR tablet Take 1 tablet (12 5 mg total) by mouth daily at bedtime as needed for sleep 30 tablet 0    diphenhydrAMINE (BENADRYL) 25 mg tablet Take 25 mg by mouth daily at bedtime as needed for itching (Patient not taking: Reported on 9/14/2021)      ergocalciferol (VITAMIN D2) 50,000 units Take 1 capsule (50,000 Units total) by mouth once a week (Patient not taking: Reported on 9/14/2021) 4 capsule 3    losartan-hydrochlorothiazide (HYZAAR) 100-25 MG per tablet Take 1 tablet by mouth daily 90 tablet 3     No current facility-administered medications for this visit  Review of Systems   Constitutional: Negative for chills and fever  HENT: Positive for postnasal drip  Negative for congestion, ear pain, hearing loss, rhinorrhea, sinus pressure, sinus pain, sore throat and trouble swallowing  Eyes: Negative for pain and visual disturbance  Respiratory: Positive for cough  Negative for chest tightness, shortness of breath and wheezing  Cardiovascular: Negative  Negative for chest pain, palpitations and leg swelling  Gastrointestinal: Negative for abdominal pain, blood in stool, constipation, diarrhea, nausea and vomiting  Endocrine: Negative for cold intolerance, heat intolerance, polydipsia, polyphagia and polyuria  Genitourinary: Negative for difficulty urinating, dysuria, flank pain and urgency  Musculoskeletal: Negative for arthralgias, back pain, gait problem and myalgias  Skin: Negative for rash  Allergic/Immunologic: Negative  Neurological: Negative for dizziness, weakness, light-headedness and headaches  Hematological: Negative      Psychiatric/Behavioral: Negative for behavioral problems, dysphoric mood and sleep disturbance  The patient is not nervous/anxious  PHQ-9 Depression Screening    PHQ-9:   Frequency of the following problems over the past two weeks:              Objective:  Vitals:    09/14/21 0805   BP: 124/78   Pulse: 71   Resp: 16   Temp: 97 8 °F (36 6 °C)   TempSrc: Tympanic   SpO2: 98%   Weight: 114 kg (252 lb)   Height: 5' 11" (1 803 m)     Body mass index is 35 15 kg/m²  Physical Exam  Constitutional:       General: He is not in acute distress  Appearance: He is well-developed  He is not diaphoretic  HENT:      Head: Normocephalic and atraumatic  Right Ear: External ear normal       Left Ear: External ear normal       Nose: Nose normal       Mouth/Throat:      Pharynx: No oropharyngeal exudate  Eyes:      General: No scleral icterus  Right eye: No discharge  Left eye: No discharge  Conjunctiva/sclera: Conjunctivae normal       Pupils: Pupils are equal, round, and reactive to light  Neck:      Thyroid: No thyromegaly  Cardiovascular:      Rate and Rhythm: Normal rate and regular rhythm  Heart sounds: Normal heart sounds  No murmur heard  No friction rub  No gallop  Pulmonary:      Effort: Pulmonary effort is normal  No respiratory distress  Breath sounds: Normal breath sounds  No wheezing or rales  Abdominal:      General: Bowel sounds are normal  There is no distension  Palpations: Abdomen is soft  Tenderness: There is no abdominal tenderness  Musculoskeletal:         General: No tenderness or deformity  Normal range of motion  Cervical back: Normal range of motion and neck supple  Skin:     General: Skin is warm and dry  Neurological:      Mental Status: He is alert and oriented to person, place, and time  Cranial Nerves: No cranial nerve deficit  Psychiatric:         Behavior: Behavior normal          Thought Content:  Thought content normal          Judgment: Judgment normal

## 2021-09-14 NOTE — ASSESSMENT & PLAN NOTE
Stable but ACE may be contributing to pts chronic cough  Will switch to losartan  Directions for use and possible side effects discussed and patient verbalized understanding of these

## 2021-10-11 DIAGNOSIS — G47.09 OTHER INSOMNIA: ICD-10-CM

## 2021-10-11 RX ORDER — ZOLPIDEM TARTRATE 12.5 MG/1
12.5 TABLET, FILM COATED, EXTENDED RELEASE ORAL
Qty: 30 TABLET | Refills: 0 | Status: SHIPPED | OUTPATIENT
Start: 2021-10-11 | End: 2021-11-10 | Stop reason: SDUPTHER

## 2021-11-02 ENCOUNTER — TELEPHONE (OUTPATIENT)
Dept: INTERNAL MEDICINE CLINIC | Facility: CLINIC | Age: 71
End: 2021-11-02

## 2021-11-10 DIAGNOSIS — G47.09 OTHER INSOMNIA: ICD-10-CM

## 2021-11-10 RX ORDER — ZOLPIDEM TARTRATE 12.5 MG/1
12.5 TABLET, FILM COATED, EXTENDED RELEASE ORAL
Qty: 30 TABLET | Refills: 0 | Status: SHIPPED | OUTPATIENT
Start: 2021-11-10 | End: 2021-12-09 | Stop reason: SDUPTHER

## 2021-12-09 DIAGNOSIS — G47.09 OTHER INSOMNIA: ICD-10-CM

## 2021-12-09 RX ORDER — ZOLPIDEM TARTRATE 12.5 MG/1
12.5 TABLET, FILM COATED, EXTENDED RELEASE ORAL
Qty: 30 TABLET | Refills: 0 | Status: SHIPPED | OUTPATIENT
Start: 2021-12-09 | End: 2022-01-11 | Stop reason: SDUPTHER

## 2021-12-20 DIAGNOSIS — I10 ESSENTIAL HYPERTENSION: ICD-10-CM

## 2021-12-20 RX ORDER — LOSARTAN POTASSIUM AND HYDROCHLOROTHIAZIDE 25; 100 MG/1; MG/1
1 TABLET ORAL DAILY
Qty: 90 TABLET | Refills: 3 | Status: CANCELLED | OUTPATIENT
Start: 2021-12-20

## 2022-01-11 DIAGNOSIS — G47.09 OTHER INSOMNIA: ICD-10-CM

## 2022-01-11 RX ORDER — ZOLPIDEM TARTRATE 12.5 MG/1
12.5 TABLET, FILM COATED, EXTENDED RELEASE ORAL
Qty: 30 TABLET | Refills: 0 | Status: SHIPPED | OUTPATIENT
Start: 2022-01-11 | End: 2022-02-08

## 2022-01-13 ENCOUNTER — APPOINTMENT (OUTPATIENT)
Dept: LAB | Facility: CLINIC | Age: 72
End: 2022-01-13
Payer: MEDICARE

## 2022-01-13 DIAGNOSIS — R73.01 ELEVATED FASTING GLUCOSE: ICD-10-CM

## 2022-01-13 DIAGNOSIS — E78.2 MIXED HYPERLIPIDEMIA: ICD-10-CM

## 2022-01-13 DIAGNOSIS — Z13.0 SCREENING FOR DEFICIENCY ANEMIA: ICD-10-CM

## 2022-01-13 DIAGNOSIS — I10 ESSENTIAL HYPERTENSION: ICD-10-CM

## 2022-01-13 DIAGNOSIS — E55.9 VITAMIN D DEFICIENCY: ICD-10-CM

## 2022-01-13 DIAGNOSIS — Z13.1 SCREENING FOR DIABETES MELLITUS: ICD-10-CM

## 2022-01-13 DIAGNOSIS — Z12.5 SCREENING PSA (PROSTATE SPECIFIC ANTIGEN): ICD-10-CM

## 2022-01-13 DIAGNOSIS — Z13.29 SCREENING FOR THYROID DISORDER: ICD-10-CM

## 2022-01-13 LAB
25(OH)D3 SERPL-MCNC: 19.8 NG/ML (ref 30–100)
ALBUMIN SERPL BCP-MCNC: 3.8 G/DL (ref 3.5–5)
ALP SERPL-CCNC: 49 U/L (ref 46–116)
ALT SERPL W P-5'-P-CCNC: 38 U/L (ref 12–78)
ANION GAP SERPL CALCULATED.3IONS-SCNC: 5 MMOL/L (ref 4–13)
AST SERPL W P-5'-P-CCNC: 21 U/L (ref 5–45)
BASOPHILS # BLD AUTO: 0.04 THOUSANDS/ΜL (ref 0–0.1)
BASOPHILS NFR BLD AUTO: 0 % (ref 0–1)
BILIRUB SERPL-MCNC: 0.82 MG/DL (ref 0.2–1)
BUN SERPL-MCNC: 19 MG/DL (ref 5–25)
CALCIUM SERPL-MCNC: 9 MG/DL (ref 8.3–10.1)
CHLORIDE SERPL-SCNC: 105 MMOL/L (ref 100–108)
CHOLEST SERPL-MCNC: 138 MG/DL
CO2 SERPL-SCNC: 28 MMOL/L (ref 21–32)
CREAT SERPL-MCNC: 0.95 MG/DL (ref 0.6–1.3)
EOSINOPHIL # BLD AUTO: 0.31 THOUSAND/ΜL (ref 0–0.61)
EOSINOPHIL NFR BLD AUTO: 3 % (ref 0–6)
ERYTHROCYTE [DISTWIDTH] IN BLOOD BY AUTOMATED COUNT: 13.3 % (ref 11.6–15.1)
EST. AVERAGE GLUCOSE BLD GHB EST-MCNC: 123 MG/DL
GFR SERPL CREATININE-BSD FRML MDRD: 79 ML/MIN/1.73SQ M
GLUCOSE P FAST SERPL-MCNC: 123 MG/DL (ref 65–99)
HBA1C MFR BLD: 5.9 %
HCT VFR BLD AUTO: 46.5 % (ref 36.5–49.3)
HDLC SERPL-MCNC: 44 MG/DL
HGB BLD-MCNC: 15 G/DL (ref 12–17)
IMM GRANULOCYTES # BLD AUTO: 0.03 THOUSAND/UL (ref 0–0.2)
IMM GRANULOCYTES NFR BLD AUTO: 0 % (ref 0–2)
LDLC SERPL CALC-MCNC: 70 MG/DL (ref 0–100)
LYMPHOCYTES # BLD AUTO: 2.64 THOUSANDS/ΜL (ref 0.6–4.47)
LYMPHOCYTES NFR BLD AUTO: 29 % (ref 14–44)
MCH RBC QN AUTO: 31.1 PG (ref 26.8–34.3)
MCHC RBC AUTO-ENTMCNC: 32.3 G/DL (ref 31.4–37.4)
MCV RBC AUTO: 97 FL (ref 82–98)
MONOCYTES # BLD AUTO: 0.84 THOUSAND/ΜL (ref 0.17–1.22)
MONOCYTES NFR BLD AUTO: 9 % (ref 4–12)
NEUTROPHILS # BLD AUTO: 5.35 THOUSANDS/ΜL (ref 1.85–7.62)
NEUTS SEG NFR BLD AUTO: 59 % (ref 43–75)
NONHDLC SERPL-MCNC: 94 MG/DL
NRBC BLD AUTO-RTO: 0 /100 WBCS
PLATELET # BLD AUTO: 181 THOUSANDS/UL (ref 149–390)
PMV BLD AUTO: 12.7 FL (ref 8.9–12.7)
POTASSIUM SERPL-SCNC: 4.2 MMOL/L (ref 3.5–5.3)
PROT SERPL-MCNC: 7.5 G/DL (ref 6.4–8.2)
PSA SERPL-MCNC: 1.1 NG/ML (ref 0–4)
RBC # BLD AUTO: 4.82 MILLION/UL (ref 3.88–5.62)
SODIUM SERPL-SCNC: 138 MMOL/L (ref 136–145)
TRIGL SERPL-MCNC: 118 MG/DL
TSH SERPL DL<=0.05 MIU/L-ACNC: 1.61 UIU/ML (ref 0.36–3.74)
WBC # BLD AUTO: 9.21 THOUSAND/UL (ref 4.31–10.16)

## 2022-01-13 PROCEDURE — G0103 PSA SCREENING: HCPCS

## 2022-01-13 PROCEDURE — 80061 LIPID PANEL: CPT

## 2022-01-13 PROCEDURE — 82306 VITAMIN D 25 HYDROXY: CPT

## 2022-01-13 PROCEDURE — 85025 COMPLETE CBC W/AUTO DIFF WBC: CPT

## 2022-01-13 PROCEDURE — 83036 HEMOGLOBIN GLYCOSYLATED A1C: CPT

## 2022-01-13 PROCEDURE — 36415 COLL VENOUS BLD VENIPUNCTURE: CPT

## 2022-01-13 PROCEDURE — 80053 COMPREHEN METABOLIC PANEL: CPT

## 2022-01-13 PROCEDURE — 84443 ASSAY THYROID STIM HORMONE: CPT

## 2022-01-18 ENCOUNTER — OFFICE VISIT (OUTPATIENT)
Dept: INTERNAL MEDICINE CLINIC | Facility: CLINIC | Age: 72
End: 2022-01-18
Payer: MEDICARE

## 2022-01-18 VITALS
WEIGHT: 267.25 LBS | SYSTOLIC BLOOD PRESSURE: 128 MMHG | OXYGEN SATURATION: 97 % | DIASTOLIC BLOOD PRESSURE: 66 MMHG | TEMPERATURE: 98.5 F | HEIGHT: 71 IN | HEART RATE: 102 BPM | BODY MASS INDEX: 37.41 KG/M2

## 2022-01-18 DIAGNOSIS — G47.09 OTHER INSOMNIA: ICD-10-CM

## 2022-01-18 DIAGNOSIS — J30.2 SEASONAL ALLERGIES: Primary | ICD-10-CM

## 2022-01-18 DIAGNOSIS — E66.01 OBESITY, MORBID (HCC): ICD-10-CM

## 2022-01-18 PROCEDURE — 99214 OFFICE O/P EST MOD 30 MIN: CPT | Performed by: PHYSICIAN ASSISTANT

## 2022-01-18 RX ORDER — FEXOFENADINE HCL 180 MG/1
180 TABLET ORAL DAILY
Qty: 30 TABLET | Refills: 2 | Status: SHIPPED | OUTPATIENT
Start: 2022-01-18 | End: 2022-05-26

## 2022-01-18 NOTE — PROGRESS NOTES
Assessment/Plan:  Problem List Items Addressed This Visit        Other    Other insomnia     Pts symptoms are stable with current regime  No changes at present  Obesity, morbid ([address] )    Seasonal allergies - Primary     Will discontinue claritin and flonase  Replace with allegra and rhinocort  Directions for use and possible side effects discussed and patient verbalized understanding of these  Relevant Medications    budesonide (Rhinocort Allergy) 32 MCG/ACT nasal spray    fexofenadine (ALLEGRA) 180 MG tablet           Diagnoses and all orders for this visit:    Seasonal allergies  -     budesonide (Rhinocort Allergy) 32 MCG/ACT nasal spray; 1 spray into each nostril daily  -     fexofenadine (ALLEGRA) 180 MG tablet; Take 1 tablet (180 mg total) by mouth daily    Obesity, morbid (HCC)    Other insomnia        Seasonal allergies  Will discontinue claritin and flonase  Replace with allegra and rhinocort  Directions for use and possible side effects discussed and patient verbalized understanding of these  Other insomnia  Pts symptoms are stable with current regime  No changes at present  Subjective:      Patient ID: Fortino Henderson is a 66 y o  male  Pt presents for routine visit  He is doing well overall  Labs are up to date as well as AWV and CRC screening  He is up to date with covid vaccines and flu vaccine  Labs reviewed in detail  He continues with issues with chronic PND  He was switched from ACE to ARB without much change  He uses flonase and claritin without much improvement  Ambien remains helpful for insomnia      The following portions of the patient's history were reviewed and updated as appropriate:   He has a past medical history of Arthritis, Hyperlipidemia, Hypertension, and Obesity  ,  does not have any pertinent problems on file  ,   has a past surgical history that includes Knee surgery; Hand surgery (Bilateral); Wrist surgery; Elbow surgery;  Colonoscopy; and Cardiac surgery  ,  family history includes Diabetes in his sister; Heart disease in his father and paternal grandmother; Kidney disease in his sister; Lupus in his mother; Rheum arthritis in his mother  ,   reports that he has quit smoking  His smoking use included cigarettes  He quit after 10 00 years of use  He has never used smokeless tobacco  He reports current alcohol use  He reports that he does not use drugs  ,  is allergic to other     Current Outpatient Medications   Medication Sig Dispense Refill    ALPRAZolam (XANAX) 0 25 mg tablet Take 2 tablets (0 5 mg total) by mouth daily at bedtime as needed for anxiety or sleep 60 tablet 0    ascorbic acid (VITAMIN C) 250 MG CHEW Chew 250 mg daily      aspirin 81 MG tablet Take 81 mg by mouth daily      losartan-hydrochlorothiazide (HYZAAR) 100-25 MG per tablet Take 1 tablet by mouth daily 90 tablet 3    metoprolol tartrate (LOPRESSOR) 25 mg tablet Take 1 tablet (25 mg total) by mouth every 12 (twelve) hours (Patient taking differently: Take 25 mg by mouth daily ) 180 tablet 1    pravastatin (PRAVACHOL) 40 mg tablet Take 1 tablet (40 mg total) by mouth daily 90 tablet 1    zolpidem (AMBIEN CR) 12 5 MG CR tablet Take 1 tablet (12 5 mg total) by mouth daily at bedtime as needed for sleep 30 tablet 0    budesonide (Rhinocort Allergy) 32 MCG/ACT nasal spray 1 spray into each nostril daily 5 mL 0    fexofenadine (ALLEGRA) 180 MG tablet Take 1 tablet (180 mg total) by mouth daily 30 tablet 2     No current facility-administered medications for this visit  Review of Systems   Constitutional: Negative for chills and fever  HENT: Positive for postnasal drip  Negative for congestion, ear pain, hearing loss, rhinorrhea, sinus pressure, sinus pain, sore throat and trouble swallowing  Eyes: Negative for pain and visual disturbance  Respiratory: Negative for cough, chest tightness, shortness of breath and wheezing  Cardiovascular: Negative    Negative for chest pain, palpitations and leg swelling  Gastrointestinal: Negative for abdominal pain, blood in stool, constipation, diarrhea, nausea and vomiting  Endocrine: Negative for cold intolerance, heat intolerance, polydipsia, polyphagia and polyuria  Genitourinary: Negative for difficulty urinating, dysuria, flank pain and urgency  Musculoskeletal: Negative for arthralgias, back pain, gait problem and myalgias  Skin: Negative for rash  Allergic/Immunologic: Negative  Neurological: Negative for dizziness, weakness, light-headedness and headaches  Hematological: Negative  Psychiatric/Behavioral: Negative for behavioral problems, dysphoric mood and sleep disturbance  The patient is not nervous/anxious  PHQ-2/9 Depression Screening    Little interest or pleasure in doing things: 0 - not at all  Feeling down, depressed, or hopeless: 0 - not at all  PHQ-2 Score: 0  PHQ-2 Interpretation: Negative depression screen          Objective:  Vitals:    01/18/22 0800   BP: 128/66   BP Location: Left arm   Patient Position: Sitting   Pulse: 102   Temp: 98 5 °F (36 9 °C)   SpO2: 97%   Weight: 121 kg (267 lb 4 oz)   Height: 5' 11" (1 803 m)     Body mass index is 37 27 kg/m²  Physical Exam  Constitutional:       General: He is not in acute distress  Appearance: He is well-developed  He is not diaphoretic  HENT:      Head: Normocephalic and atraumatic  Right Ear: External ear normal       Left Ear: External ear normal       Nose: Nose normal       Mouth/Throat:      Pharynx: No oropharyngeal exudate  Eyes:      General: No scleral icterus  Right eye: No discharge  Left eye: No discharge  Conjunctiva/sclera: Conjunctivae normal       Pupils: Pupils are equal, round, and reactive to light  Neck:      Thyroid: No thyromegaly  Cardiovascular:      Rate and Rhythm: Normal rate and regular rhythm  Heart sounds: Normal heart sounds  No murmur heard  No friction rub   No gallop  Pulmonary:      Effort: Pulmonary effort is normal  No respiratory distress  Breath sounds: Normal breath sounds  No wheezing or rales  Abdominal:      General: Bowel sounds are normal  There is no distension  Palpations: Abdomen is soft  Tenderness: There is no abdominal tenderness  Musculoskeletal:         General: No tenderness or deformity  Normal range of motion  Cervical back: Normal range of motion and neck supple  Skin:     General: Skin is warm and dry  Neurological:      Mental Status: He is alert and oriented to person, place, and time  Cranial Nerves: No cranial nerve deficit  Psychiatric:         Behavior: Behavior normal          Thought Content: Thought content normal          Judgment: Judgment normal        BMI Counseling: Body mass index is 37 27 kg/m²  The BMI is above normal  Nutrition recommendations include decreasing portion sizes, consuming healthier snacks and limiting drinks that contain sugar  Rationale for BMI follow-up plan is due to patient being overweight or obese  Depression Screening and Follow-up Plan: Patient was screened for depression during today's encounter  They screened negative with a PHQ-2 score of 0

## 2022-01-18 NOTE — ASSESSMENT & PLAN NOTE
Will discontinue claritin and flonase  Replace with allegra and rhinocort  Directions for use and possible side effects discussed and patient verbalized understanding of these

## 2022-02-08 ENCOUNTER — TELEPHONE (OUTPATIENT)
Dept: INTERNAL MEDICINE CLINIC | Facility: CLINIC | Age: 72
End: 2022-02-08

## 2022-02-08 DIAGNOSIS — G47.09 OTHER INSOMNIA: Primary | ICD-10-CM

## 2022-02-08 RX ORDER — ZOLPIDEM TARTRATE 10 MG/1
10 TABLET ORAL
Qty: 30 TABLET | Refills: 0 | Status: SHIPPED | OUTPATIENT
Start: 2022-02-08 | End: 2022-03-10 | Stop reason: SDUPTHER

## 2022-02-08 NOTE — TELEPHONE ENCOUNTER
Bearl Aquiles cr is no longer covered by his plan and I spoke w/ him last week, he is ok with switching to plain ambien 10mg

## 2022-02-28 DIAGNOSIS — E78.5 HYPERLIPIDEMIA, UNSPECIFIED HYPERLIPIDEMIA TYPE: ICD-10-CM

## 2022-02-28 RX ORDER — PRAVASTATIN SODIUM 40 MG
40 TABLET ORAL DAILY
Qty: 90 TABLET | Refills: 1 | Status: SHIPPED | OUTPATIENT
Start: 2022-02-28

## 2022-03-10 DIAGNOSIS — G47.09 OTHER INSOMNIA: ICD-10-CM

## 2022-03-11 RX ORDER — ZOLPIDEM TARTRATE 10 MG/1
10 TABLET ORAL
Qty: 30 TABLET | Refills: 0 | Status: SHIPPED | OUTPATIENT
Start: 2022-03-11 | End: 2022-04-08 | Stop reason: SDUPTHER

## 2022-04-08 DIAGNOSIS — G47.09 OTHER INSOMNIA: ICD-10-CM

## 2022-04-08 RX ORDER — ZOLPIDEM TARTRATE 10 MG/1
10 TABLET ORAL
Qty: 30 TABLET | Refills: 0 | Status: SHIPPED | OUTPATIENT
Start: 2022-04-08 | End: 2022-05-09 | Stop reason: SDUPTHER

## 2022-05-09 DIAGNOSIS — G47.09 OTHER INSOMNIA: ICD-10-CM

## 2022-05-10 RX ORDER — ZOLPIDEM TARTRATE 10 MG/1
10 TABLET ORAL
Qty: 30 TABLET | Refills: 0 | Status: SHIPPED | OUTPATIENT
Start: 2022-05-10 | End: 2022-06-09 | Stop reason: SDUPTHER

## 2022-05-25 DIAGNOSIS — R06.00 DYSPNEA ON EXERTION: ICD-10-CM

## 2022-05-25 DIAGNOSIS — R94.39 ABNORMAL STRESS ECHO: ICD-10-CM

## 2022-05-25 DIAGNOSIS — I10 ESSENTIAL HYPERTENSION: ICD-10-CM

## 2022-05-26 ENCOUNTER — HOSPITAL ENCOUNTER (OUTPATIENT)
Dept: NON INVASIVE DIAGNOSTICS | Facility: HOSPITAL | Age: 72
Discharge: HOME | End: 2022-05-26
Payer: MEDICARE

## 2022-05-26 ENCOUNTER — APPOINTMENT (OUTPATIENT)
Dept: RADIOLOGY | Facility: CLINIC | Age: 72
End: 2022-05-26
Payer: MEDICARE

## 2022-05-26 ENCOUNTER — OFFICE VISIT (OUTPATIENT)
Dept: INTERNAL MEDICINE CLINIC | Facility: CLINIC | Age: 72
End: 2022-05-26
Payer: MEDICARE

## 2022-05-26 VITALS
BODY MASS INDEX: 37.12 KG/M2 | SYSTOLIC BLOOD PRESSURE: 124 MMHG | DIASTOLIC BLOOD PRESSURE: 64 MMHG | TEMPERATURE: 97.6 F | WEIGHT: 265.13 LBS | HEART RATE: 99 BPM | OXYGEN SATURATION: 92 % | HEIGHT: 71 IN

## 2022-05-26 DIAGNOSIS — Z00.00 MEDICARE ANNUAL WELLNESS VISIT, SUBSEQUENT: ICD-10-CM

## 2022-05-26 DIAGNOSIS — I10 PRIMARY HYPERTENSION: ICD-10-CM

## 2022-05-26 DIAGNOSIS — R60.0 LOCALIZED EDEMA: ICD-10-CM

## 2022-05-26 DIAGNOSIS — G47.09 OTHER INSOMNIA: ICD-10-CM

## 2022-05-26 DIAGNOSIS — F41.8 ANXIETY ABOUT HEALTH: ICD-10-CM

## 2022-05-26 DIAGNOSIS — J30.2 SEASONAL ALLERGIES: ICD-10-CM

## 2022-05-26 DIAGNOSIS — M25.471 RIGHT ANKLE SWELLING: ICD-10-CM

## 2022-05-26 DIAGNOSIS — M25.471 RIGHT ANKLE SWELLING: Primary | ICD-10-CM

## 2022-05-26 DIAGNOSIS — L84 CALLUS: ICD-10-CM

## 2022-05-26 PROCEDURE — 93971 EXTREMITY STUDY: CPT

## 2022-05-26 PROCEDURE — 1123F ACP DISCUSS/DSCN MKR DOCD: CPT | Performed by: PHYSICIAN ASSISTANT

## 2022-05-26 PROCEDURE — G0439 PPPS, SUBSEQ VISIT: HCPCS | Performed by: PHYSICIAN ASSISTANT

## 2022-05-26 PROCEDURE — 73610 X-RAY EXAM OF ANKLE: CPT

## 2022-05-26 PROCEDURE — 93971 EXTREMITY STUDY: CPT | Performed by: SURGERY

## 2022-05-26 PROCEDURE — 99214 OFFICE O/P EST MOD 30 MIN: CPT | Performed by: PHYSICIAN ASSISTANT

## 2022-05-26 RX ORDER — CHLORAL HYDRATE 500 MG
1000 CAPSULE ORAL DAILY
COMMUNITY

## 2022-05-26 RX ORDER — MONTELUKAST SODIUM 10 MG/1
10 TABLET ORAL
Qty: 30 TABLET | Refills: 5 | Status: SHIPPED | OUTPATIENT
Start: 2022-05-26

## 2022-05-26 RX ORDER — FLUTICASONE PROPIONATE 50 MCG
1 SPRAY, SUSPENSION (ML) NASAL DAILY
COMMUNITY

## 2022-05-26 NOTE — PROGRESS NOTES
Assessment and Plan:     Problem List Items Addressed This Visit        Cardiovascular and Mediastinum    Hypertension    Relevant Medications    metoprolol succinate (Toprol XL) 25 mg 24 hr tablet       Musculoskeletal and Integument    Callus    Relevant Orders    Ambulatory Referral to Podiatry       Other    Other insomnia    Relevant Medications    ALPRAZolam (XANAX) 0 25 mg tablet    Anxiety about health    Relevant Medications    ALPRAZolam (XANAX) 0 25 mg tablet    Seasonal allergies     Start singulair  Directions for use and possible side effects discussed and patient verbalized understanding of these  Relevant Medications    montelukast (SINGULAIR) 10 mg tablet    Localized edema     Relevant Orders    VAS lower limb venous duplex study, unilateral/limited (Completed)    Right ankle swelling - Primary     Will get xray right ankle and doppler of RLE  Treat according to results           Relevant Orders    VAS lower limb venous duplex study, unilateral/limited (Completed)    XR ankle 3+ vw right (Completed)      Other Visit Diagnoses     Medicare annual wellness visit, subsequent              Depression Screening and Follow-up Plan: Patient was screened for depression during today's encounter  They screened negative with a PHQ-2 score of 0  Preventive health issues were discussed with patient, and age appropriate screening tests were ordered as noted in patient's After Visit Summary  Personalized health advice and appropriate referrals for health education or preventive services given if needed, as noted in patient's After Visit Summary       History of Present Illness:     Patient presents for Medicare Annual Wellness visit    Patient Care Team:  Chloe Arroyo PA-C as PCP - General (Internal Medicine)     Problem List:     Patient Active Problem List   Diagnosis    Other insomnia    Hyperlipidemia    Hypertension    Abnormal stress echo    Elevated fasting glucose    Restless legs syndrome    Obesity, morbid (HCC)    Anxiety about health    Seasonal allergies    Localized edema     Callus    Right ankle swelling      Past Medical and Surgical History:     Past Medical History:   Diagnosis Date    Arthritis     Hyperlipidemia     Hypertension     Obesity      Past Surgical History:   Procedure Laterality Date    CARDIAC SURGERY      heart catherization    COLONOSCOPY      ELBOW SURGERY      HAND SURGERY Bilateral     KNEE SURGERY      WRIST SURGERY        Family History:     Family History   Problem Relation Age of Onset    Rheum arthritis Mother     Lupus Mother     Heart disease Father     Diabetes Sister     Kidney disease Sister     Heart disease Paternal Grandmother       Social History:     Social History     Socioeconomic History    Marital status: /Civil Union     Spouse name: None    Number of children: None    Years of education: None    Highest education level: None   Occupational History    Occupation: RETIRED   Tobacco Use    Smoking status: Former Smoker     Years: 10 00     Types: Cigarettes    Smokeless tobacco: Never Used   Vaping Use    Vaping Use: Never used   Substance and Sexual Activity    Alcohol use: Yes     Comment: SOCIAL    Drug use: No    Sexual activity: None   Other Topics Concern    None   Social History Narrative    RETIRED         Social Determinants of Health     Financial Resource Strain: Not on file   Food Insecurity: Not on file   Transportation Needs: Not on file   Physical Activity: Not on file   Stress: Not on file   Social Connections: Not on file   Intimate Partner Violence: Not on file   Housing Stability: Not on file      Medications and Allergies:     Current Outpatient Medications   Medication Sig Dispense Refill    ALPRAZolam (XANAX) 0 25 mg tablet Take 1 tablet (0 25 mg total) by mouth daily at bedtime as needed for anxiety or sleep 30 tablet 1    aspirin 81 MG tablet Take 81 mg by mouth daily      fluticasone (FLONASE) 50 mcg/act nasal spray 1 spray into each nostril daily      losartan-hydrochlorothiazide (HYZAAR) 100-25 MG per tablet Take 1 tablet by mouth daily 90 tablet 3    metoprolol succinate (Toprol XL) 25 mg 24 hr tablet Take 1 tablet (25 mg total) by mouth daily 30 tablet 5    montelukast (SINGULAIR) 10 mg tablet Take 1 tablet (10 mg total) by mouth daily at bedtime 30 tablet 5    Omega-3 Fatty Acids (fish oil) 1,000 mg Take 1,000 mg by mouth daily      pravastatin (PRAVACHOL) 40 mg tablet Take 1 tablet (40 mg total) by mouth daily 90 tablet 1    zolpidem (AMBIEN) 10 mg tablet Take 1 tablet (10 mg total) by mouth daily at bedtime as needed for sleep 30 tablet 0    ascorbic acid (VITAMIN C) 250 MG CHEW Chew 250 mg daily (Patient not taking: Reported on 5/26/2022)      budesonide (Rhinocort Allergy) 32 MCG/ACT nasal spray 1 spray into each nostril daily (Patient not taking: Reported on 5/26/2022) 5 mL 0     No current facility-administered medications for this visit       Allergies   Allergen Reactions    Other      Summertime grass, weeds      Immunizations:     Immunization History   Administered Date(s) Administered    COVID-19 MODERNA VACC 0 5 ML IM 04/02/2021, 05/03/2021, 12/13/2021    INFLUENZA 11/03/2016, 09/15/2017, 10/18/2019, 12/04/2019    Influenza Split High Dose Preservative Free IM 10/18/2019    Influenza, high dose seasonal 0 7 mL 12/18/2018, 09/14/2021    Pneumococcal Conjugate 13-Valent 06/19/2018      Health Maintenance:         Topic Date Due    Colorectal Cancer Screening  09/12/2027    Hepatitis C Screening  Completed         Topic Date Due    DTaP,Tdap,and Td Vaccines (1 - Tdap) Never done    Pneumococcal Vaccine: 65+ Years (2 - PPSV23 or PCV20) 06/19/2019    COVID-19 Vaccine (4 - Booster for Moderna series) 04/13/2022      Medicare Health Risk Assessment:     /64 (BP Location: Left arm, Patient Position: Sitting)   Pulse 99   Temp 97 6 °F (36 4 °C)   Ht 5' 11" (1 803 m)   Wt 120 kg (265 lb 2 oz)   SpO2 92%   BMI 36 98 kg/m²      Bella Man is here for his Subsequent Wellness visit  Last Medicare Wellness visit information reviewed, patient interviewed and updates made to the record today  Health Risk Assessment:   Patient rates overall health as good  Patient feels that their physical health rating is slightly better  Patient is satisfied with their life  Eyesight was rated as same  Hearing was rated as slightly worse  Patient feels that their emotional and mental health rating is same  Patients states they are never, rarely angry  Patient states they are sometimes unusually tired/fatigued  Pain experienced in the last 7 days has been none  Patient states that he has experienced no weight loss or gain in last 6 months  Depression Screening:   PHQ-2 Score: 0      Home Safety:  Patient does not have trouble with stairs inside or outside of their home  Patient has working smoke alarms and has working carbon monoxide detector  Home safety hazards include: none  Nutrition:   Current diet is Regular and Low Carb  Medications:   Patient is currently taking over-the-counter supplements  OTC medications include: see medication list  Patient is able to manage medications  Activities of Daily Living (ADLs)/Instrumental Activities of Daily Living (IADLs):   Walk and transfer into and out of bed and chair?: Yes  Dress and groom yourself?: Yes    Bathe or shower yourself?: Yes    Feed yourself? Yes  Do your laundry/housekeeping?: Yes  Manage your money, pay your bills and track your expenses?: Yes  Make your own meals?: Yes    Do your own shopping?: Yes    Previous Hospitalizations:   Any hospitalizations or ED visits within the last 12 months?: No      Advance Care Planning:   Living will: Yes    Durable POA for healthcare:  Yes    Advanced directive: Yes      Cognitive Screening:   Provider or family/friend/caregiver concerned regarding cognition?: No    PREVENTIVE SCREENINGS      Cardiovascular Screening:    General: Screening Not Indicated and History Lipid Disorder      Diabetes Screening:     General: Screening Current      Colorectal Cancer Screening:     General: Screening Current      Prostate Cancer Screening:    General: Screening Current      Osteoporosis Screening:    General: Screening Not Indicated      Abdominal Aortic Aneurysm (AAA) Screening:    Risk factors include: age between 73-67 yo and tobacco use        Lung Cancer Screening:     General: Screening Not Indicated      Hepatitis C Screening:    General: Screening Current    Screening, Brief Intervention, and Referral to Treatment (SBIRT)    Screening    Typical number of drinks in a week: 6    Single Item Drug Screening:  How often have you used an illegal drug (including marijuana) or a prescription medication for non-medical reasons in the past year? never    Single Item Drug Screen Score: 0  Interpretation: Negative screen for possible drug use disorder      Eva Smart PA-C

## 2022-05-26 NOTE — PATIENT INSTRUCTIONS

## 2022-05-27 PROBLEM — M25.471 RIGHT ANKLE SWELLING: Status: ACTIVE | Noted: 2022-05-27

## 2022-05-27 PROBLEM — R60.0 LOCALIZED EDEMA: Status: ACTIVE | Noted: 2022-05-27

## 2022-05-27 PROBLEM — L84 CALLUS: Status: ACTIVE | Noted: 2022-05-27

## 2022-05-27 RX ORDER — ALPRAZOLAM 0.25 MG/1
0.25 TABLET ORAL
Qty: 30 TABLET | Refills: 1 | Status: SHIPPED | OUTPATIENT
Start: 2022-05-27

## 2022-05-27 RX ORDER — METOPROLOL SUCCINATE 25 MG/1
25 TABLET, EXTENDED RELEASE ORAL DAILY
Qty: 30 TABLET | Refills: 5 | Status: SHIPPED | OUTPATIENT
Start: 2022-05-27

## 2022-05-27 NOTE — PROGRESS NOTES
Assessment/Plan:  Problem List Items Addressed This Visit        Cardiovascular and Mediastinum    Hypertension    Relevant Medications    metoprolol succinate (Toprol XL) 25 mg 24 hr tablet       Musculoskeletal and Integument    Callus    Relevant Orders    Ambulatory Referral to Podiatry       Other    Other insomnia    Relevant Medications    ALPRAZolam (XANAX) 0 25 mg tablet    Anxiety about health    Relevant Medications    ALPRAZolam (XANAX) 0 25 mg tablet    Seasonal allergies     Start singulair  Directions for use and possible side effects discussed and patient verbalized understanding of these  Relevant Medications    montelukast (SINGULAIR) 10 mg tablet    Localized edema     Relevant Orders    VAS lower limb venous duplex study, unilateral/limited (Completed)    Right ankle swelling - Primary     Will get xray right ankle and doppler of RLE  Treat according to results           Relevant Orders    VAS lower limb venous duplex study, unilateral/limited (Completed)    XR ankle 3+ vw right (Completed)      Other Visit Diagnoses     Medicare annual wellness visit, subsequent               Diagnoses and all orders for this visit:    Right ankle swelling  -     VAS lower limb venous duplex study, unilateral/limited; Future  -     XR ankle 3+ vw right; Future    Medicare annual wellness visit, subsequent    Callus  -     Ambulatory Referral to Podiatry; Future    Localized edema   -     VAS lower limb venous duplex study, unilateral/limited; Future    Seasonal allergies  -     montelukast (SINGULAIR) 10 mg tablet; Take 1 tablet (10 mg total) by mouth daily at bedtime    Other insomnia  -     ALPRAZolam (XANAX) 0 25 mg tablet; Take 1 tablet (0 25 mg total) by mouth daily at bedtime as needed for anxiety or sleep    Anxiety about health  -     ALPRAZolam (XANAX) 0 25 mg tablet;  Take 1 tablet (0 25 mg total) by mouth daily at bedtime as needed for anxiety or sleep    Primary hypertension  - metoprolol succinate (Toprol XL) 25 mg 24 hr tablet; Take 1 tablet (25 mg total) by mouth daily    Other orders  -     fluticasone (FLONASE) 50 mcg/act nasal spray; 1 spray into each nostril daily  -     Omega-3 Fatty Acids (fish oil) 1,000 mg; Take 1,000 mg by mouth daily      Right ankle swelling  Will get xray right ankle and doppler of RLE  Treat according to results    Seasonal allergies  Start singulair  Directions for use and possible side effects discussed and patient verbalized understanding of these  Subjective:      Patient ID: Dontrell Bardales is a 66 y o  male  Pt presents for routine visit  Subsequent AWV also performed  He notes ongoing seasonal allergies  He uses flonase which helps a little  Allegra and zyrtec did not help  He also notes swelling in his right ankle  Denies injury  Some slight redness  Denies history of gout  The following portions of the patient's history were reviewed and updated as appropriate:   He has a past medical history of Arthritis, Hyperlipidemia, Hypertension, and Obesity  ,  does not have any pertinent problems on file  ,   has a past surgical history that includes Knee surgery; Hand surgery (Bilateral); Wrist surgery; Elbow surgery; Colonoscopy; and Cardiac surgery  ,  family history includes Diabetes in his sister; Heart disease in his father and paternal grandmother; Kidney disease in his sister; Lupus in his mother; Rheum arthritis in his mother  ,   reports that he has quit smoking  His smoking use included cigarettes  He quit after 10 00 years of use  He has never used smokeless tobacco  He reports current alcohol use  He reports that he does not use drugs  ,  is allergic to other     Current Outpatient Medications   Medication Sig Dispense Refill    ALPRAZolam (XANAX) 0 25 mg tablet Take 1 tablet (0 25 mg total) by mouth daily at bedtime as needed for anxiety or sleep 30 tablet 1    aspirin 81 MG tablet Take 81 mg by mouth daily      fluticasone (FLONASE) 50 mcg/act nasal spray 1 spray into each nostril daily      losartan-hydrochlorothiazide (HYZAAR) 100-25 MG per tablet Take 1 tablet by mouth daily 90 tablet 3    metoprolol succinate (Toprol XL) 25 mg 24 hr tablet Take 1 tablet (25 mg total) by mouth daily 30 tablet 5    montelukast (SINGULAIR) 10 mg tablet Take 1 tablet (10 mg total) by mouth daily at bedtime 30 tablet 5    Omega-3 Fatty Acids (fish oil) 1,000 mg Take 1,000 mg by mouth daily      pravastatin (PRAVACHOL) 40 mg tablet Take 1 tablet (40 mg total) by mouth daily 90 tablet 1    zolpidem (AMBIEN) 10 mg tablet Take 1 tablet (10 mg total) by mouth daily at bedtime as needed for sleep 30 tablet 0    ascorbic acid (VITAMIN C) 250 MG CHEW Chew 250 mg daily (Patient not taking: Reported on 5/26/2022)      budesonide (Rhinocort Allergy) 32 MCG/ACT nasal spray 1 spray into each nostril daily (Patient not taking: Reported on 5/26/2022) 5 mL 0     No current facility-administered medications for this visit  Review of Systems   Constitutional: Negative for chills and fever  HENT: Positive for postnasal drip and rhinorrhea  Negative for congestion, ear pain, hearing loss, sinus pressure, sinus pain, sore throat and trouble swallowing  Eyes: Negative for pain and visual disturbance  Respiratory: Negative for cough, chest tightness, shortness of breath and wheezing  Cardiovascular: Positive for leg swelling  Negative for chest pain and palpitations  Gastrointestinal: Negative for abdominal pain, blood in stool, constipation, diarrhea, nausea and vomiting  Endocrine: Negative for cold intolerance, heat intolerance, polydipsia, polyphagia and polyuria  Genitourinary: Negative for difficulty urinating, dysuria, flank pain and urgency  Musculoskeletal: Negative for arthralgias, back pain, gait problem and myalgias  Skin: Negative for rash  Allergic/Immunologic: Negative      Neurological: Negative for dizziness, weakness, light-headedness and headaches  Hematological: Negative  Psychiatric/Behavioral: Negative for behavioral problems, dysphoric mood and sleep disturbance  The patient is not nervous/anxious  PHQ-2/9 Depression Screening    Little interest or pleasure in doing things: 0 - not at all  Feeling down, depressed, or hopeless: 0 - not at all  PHQ-2 Score: 0  PHQ-2 Interpretation: Negative depression screen          Objective:  Vitals:    05/26/22 0825   BP: 124/64   BP Location: Left arm   Patient Position: Sitting   Pulse: 99   Temp: 97 6 °F (36 4 °C)   SpO2: 92%   Weight: 120 kg (265 lb 2 oz)   Height: 5' 11" (1 803 m)     Body mass index is 36 98 kg/m²  Physical Exam  Constitutional:       General: He is not in acute distress  Appearance: He is well-developed  He is not diaphoretic  HENT:      Head: Normocephalic and atraumatic  Right Ear: External ear normal       Left Ear: External ear normal       Nose: Congestion and rhinorrhea present  Mouth/Throat:      Pharynx: No oropharyngeal exudate  Eyes:      General: No scleral icterus  Right eye: No discharge  Left eye: No discharge  Conjunctiva/sclera: Conjunctivae normal       Pupils: Pupils are equal, round, and reactive to light  Neck:      Thyroid: No thyromegaly  Cardiovascular:      Rate and Rhythm: Normal rate and regular rhythm  Heart sounds: Normal heart sounds  No murmur heard  No friction rub  No gallop  Pulmonary:      Effort: Pulmonary effort is normal  No respiratory distress  Breath sounds: Normal breath sounds  No wheezing or rales  Abdominal:      General: Bowel sounds are normal  There is no distension  Palpations: Abdomen is soft  Tenderness: There is no abdominal tenderness  Musculoskeletal:         General: No tenderness or deformity  Normal range of motion  Cervical back: Normal range of motion and neck supple  Right lower leg: Edema present     Skin: General: Skin is warm and dry  Neurological:      Mental Status: He is alert and oriented to person, place, and time  Cranial Nerves: No cranial nerve deficit  Psychiatric:         Behavior: Behavior normal          Thought Content: Thought content normal          Judgment: Judgment normal          Depression Screening and Follow-up Plan: Patient was screened for depression during today's encounter  They screened negative with a PHQ-2 score of 0

## 2022-05-27 NOTE — ASSESSMENT & PLAN NOTE
Start singulair  Directions for use and possible side effects discussed and patient verbalized understanding of these

## 2022-06-08 ENCOUNTER — OFFICE VISIT (OUTPATIENT)
Dept: PODIATRY | Facility: CLINIC | Age: 72
End: 2022-06-08
Payer: MEDICARE

## 2022-06-08 VITALS
BODY MASS INDEX: 37.1 KG/M2 | HEIGHT: 71 IN | DIASTOLIC BLOOD PRESSURE: 64 MMHG | SYSTOLIC BLOOD PRESSURE: 124 MMHG | WEIGHT: 265 LBS

## 2022-06-08 DIAGNOSIS — G62.9 NEUROPATHY: ICD-10-CM

## 2022-06-08 DIAGNOSIS — M79.671 PAIN IN BOTH FEET: Primary | ICD-10-CM

## 2022-06-08 DIAGNOSIS — B35.1 ONYCHOMYCOSIS: ICD-10-CM

## 2022-06-08 DIAGNOSIS — M79.672 PAIN IN BOTH FEET: Primary | ICD-10-CM

## 2022-06-08 DIAGNOSIS — I87.2 VENOUS INSUFFICIENCY: ICD-10-CM

## 2022-06-08 DIAGNOSIS — I73.9 PERIPHERAL ARTERIAL DISEASE (HCC): ICD-10-CM

## 2022-06-08 DIAGNOSIS — L84 CALLUS: ICD-10-CM

## 2022-06-08 PROCEDURE — 11720 DEBRIDE NAIL 1-5: CPT | Performed by: PODIATRIST

## 2022-06-08 PROCEDURE — 99203 OFFICE O/P NEW LOW 30 MIN: CPT | Performed by: PODIATRIST

## 2022-06-08 PROCEDURE — 11057 PARNG/CUTG B9 HYPRKR LES >4: CPT | Performed by: PODIATRIST

## 2022-06-08 NOTE — PATIENT INSTRUCTIONS
Urea cream 20%, compression stockings 15-20 mmHg below-knee    Venous Insufficiency   AMBULATORY CARE:   Venous insufficiency  is a condition that prevents blood from flowing out of your legs and back to your heart  Veins contain valves that help blood flow in one direction  Venous insufficiency means the valves do not close correctly or fully  Blood flows back and pools in your leg  This can cause problems such as varicose veins  Venous insufficiency may also be called chronic venous insufficiency or venous stasis  Common signs and symptoms:   Visible veins on your legs that may be small and red or large, thick, and blue         Swelling in your ankles or calves         Changes in skin color, such as dark or purple skin    An ulcer (open sore) on your leg    Leg pain that is worse when you are menstruating (women) or when you stand, and better when you elevate your legs    Burning or itching    Cramps that happen at night    Thick, hard skin on your legs and ankles    Feeling of heaviness in your legs    Seek care immediately if:   You have a wound that does not heal or is infected  You have an injury that has broken your skin and caused your varicose veins to bleed  Your leg is swollen and hard  You have pain in your leg that does not go away or gets worse  Your legs or feet are turning blue or black  Your leg feels warm, tender, and painful  It may look swollen and red  Call your doctor if:   You have a fever  You have varicose veins and they are painful  You have new or worsening leg pain, swelling, or redness  You have new or worsening ulcers or other sores on your leg  You have questions or concerns about your condition or care  Treatment  may include any of the following:  Medicine  may be given to improve blood flow  The medicines may thin your blood or reduce swelling to help blood flow  You may also need medicine to treat a bacterial infection      Ablation  is a procedure used to close varicose veins  A catheter is guided until it is near the vein  A device will then be guided to the area  The device may produce energy through radiofrequency or a laser  The energy creates heat that will close the blood vessel  Sclerotherapy  is a procedure used to fade visible veins  Your healthcare provider will inject a liquid into a spider vein or varicose vein  The liquid causes irritation in the vein  The vein swells and sticks together  Your body will then absorb the vein  Surgery  may be needed if other treatments do not work  Surgery may be used to repair a leg vein valve or to clip or tie off a vein so blood cannot flow through it  You may need to have a veins removed during surgery called stripping  Surgery may be used to bypass (go around) the damaged vein  Blood will flow through a vein transplanted from another part of your body  Manage your symptoms:   Wear pressure stockings as directed  Pressure stockings help keep blood from pooling in your leg veins  Your healthcare provider can prescribe stockings that are right for you  Do not buy over-the-counter pressure stockings unless your healthcare provider says it is okay  They may not fit correctly or may have elastic that cuts off your circulation  Ask your healthcare provider when to start wearing pressure stockings and how long to wear them each day  Do not sit or stand for long periods of time  If you have to sit for a long time, flex and extend your legs, feet, and ankles  Do this about 10 times every 30 minutes to help keep blood flowing  If you have to stand for a long time, take breaks and sit with your legs elevated  Elevate your legs  Elevate your legs above the level of your heart to reduce swelling  Your healthcare provider may recommend that you keep your legs elevated for 30 minutes at a time  You may need to do this 3 to 4 times per day, or more if your healthcare provider recommends           Do not smoke   Nicotine and other chemicals in cigarettes and cigars can cause blood vessel damage  Ask your healthcare provider for information if you currently smoke and need help to quit  E-cigarettes or smokeless tobacco still contain nicotine  Talk to your healthcare provider before you use these products  Reach or maintain a healthy weight  Extra weight can make venous insufficiency worse  Ask your healthcare provider what a healthy weight is for you  He or she can help you create a weight loss plan if you need to lose weight  Exercise as directed  Walking can help increase blood flow in your calves  Ask your healthcare provider how much exercise you need each day and which exercises are best for you  Care for your skin  Keep your skin clean  Do not use any soaps or lotions that may dry your skin  For example, do not use products that contain fragrance or alcohol  If you have a skin ulcer, your healthcare provider may recommend a wet-to-dry bandage  To do this, apply a wet bandage to your wound and allow it to dry  This will help remove drainage from your wound each time you change the bandage  Your healthcare provider will tell you how often to change your bandage and which kind of bandage to use  Check your wound for signs of infection, such as swelling or pus  Go to physical therapy (PT) as directed  A physical therapist can help you increase movement and range of motion in your legs  Follow up with your doctor as directed:  Write down your questions so you remember to ask them during your visits  © Copyright [employer] 2022 Information is for End User's use only and may not be sold, redistributed or otherwise used for commercial purposes  All illustrations and images included in CareNotes® are the copyrighted property of [employer]  or Dominga Cochran  The above information is an  only   It is not intended as medical advice for individual conditions or treatments  Talk to your doctor, nurse or pharmacist before following any medical regimen to see if it is safe and effective for you

## 2022-06-08 NOTE — PROGRESS NOTES
HISTORY AND PHYSICAL EXAM  - [facility]    PATIENT:  Aditya Gottlieb    1950      Assessment/Plan     Problem List Items Addressed This Visit        Musculoskeletal and Integument    Callus      Other Visit Diagnoses     Pain in both feet    -  Primary    Peripheral arterial disease ([facility] )        Neuropathy        Venous insufficiency        Onychomycosis               Diagnoses and all orders for this visit:    Pain in both feet  -     Cancel: X-ray foot left 3+ views; Future  -     Cancel: X-ray foot right 3+ views; Future    Callus  -     Ambulatory Referral to Podiatry    Peripheral arterial disease ([facility] )    Neuropathy    Venous insufficiency    Onychomycosis     -Rx given for compression stockings and advised on use   -nails 1, 5 bilateral feet were debrided with reduction in length and thickness x4  -callus care was rendered utilizing sharp debridement down to epithelium times 6  There is no [place] capture for [id number]  -I discussed at length daily pedal checks and daily pedal examinations to avoid ulcerations, we discussed for pre ulcerative callus formation in he was advised to come in if there are any punctate bloody areas noted beneath the callus  -he would benefit from extra-depth and extra width shoes, but unfortunately only coverage is rendered for diabetic shoes        History of Present Illness   Aditya Gottlieb is a 66 y o  male who presents with bilateral foot pain due to thickening of skin on his bilateral feet  He has neuropathy, and has numbness and tingling in the bottom of his feet which does not keep him up at night  This been present for years, he is unsure why he has it  He is not diabetic  He does dry blood on his toes every tries to touch him, he has to use scissors because he can not get the nails into the regular nail clippers    Also has a history of right lower extremity swelling for multiple years duration, is unsure of the origin, has been tested for DVT in his negative  Does not use compression stockings  -Q 9 findings  Review of Systems   Constitutional: Negative for chills and fever  HENT: Negative for ear pain and sore throat  Eyes: Negative for pain and visual disturbance  Respiratory: Negative for cough and shortness of breath  Cardiovascular: Negative for chest pain and palpitations  Gastrointestinal: Negative for abdominal pain and vomiting  Genitourinary: Negative for dysuria and hematuria  Musculoskeletal: Negative for arthralgias and back pain  Skin: Negative for color change and rash  Neurological: Negative for seizures and syncope  All other systems reviewed and are negative  Historical Information   Past Medical History:   Diagnosis Date    Arthritis     Hyperlipidemia     Hypertension     Obesity      Past Surgical History:   Procedure Laterality Date    CARDIAC SURGERY      heart catherization    COLONOSCOPY      ELBOW SURGERY      HAND SURGERY Bilateral     KNEE SURGERY      WRIST SURGERY       Social History   Social History     Substance and Sexual Activity   Alcohol Use Yes    Comment: SOCIAL     Social History     Substance and Sexual Activity   Drug Use No     Social History     Tobacco Use   Smoking Status Former Smoker    Years: 10 00    Types: Cigarettes   Smokeless Tobacco Never Used     Family History: non-contributory    Meds/Allergies   all medications and allergies reviewed  Allergies   Allergen Reactions    Other      Summertime grass, weeds       Objective   Vitals: Blood pressure 124/64, height 5' 11" (1 803 m), weight 120 kg (265 lb)  ,Body mass index is 36 96 kg/m²  Physical Exam  Vitals reviewed  Constitutional:       Appearance: Normal appearance  He is obese  HENT:      Head: Normocephalic and atraumatic  Nose: Nose normal       Mouth/Throat:      Mouth: Mucous membranes are moist    Eyes:      Pupils: Pupils are equal, round, and reactive to light     Pulmonary: Effort: Pulmonary effort is normal    Musculoskeletal:         General: Swelling and tenderness present  Normal range of motion  Right lower leg: Edema present  Comments: Callus sub met 1 bilateral, sub proximal phalanx with extension proximal to the PIPJ, and medial heel bilateral   Nails 1, 5 bilateral feet are thickened elongated with notable subungual debris  Skin is shiny and atrophic  Pulses are +1/4 bilateral lower extremities DP and PT  There is +2/4 pitting edema to the right lower extremity  There is positive paresthesias bilateral feet   Skin:     Capillary Refill: Capillary refill takes 2 to 3 seconds  Neurological:      General: No focal deficit present  Mental Status: He is alert and oriented to person, place, and time           Ortho Exam

## 2022-06-09 DIAGNOSIS — G47.09 OTHER INSOMNIA: ICD-10-CM

## 2022-06-10 RX ORDER — ZOLPIDEM TARTRATE 10 MG/1
10 TABLET ORAL
Qty: 30 TABLET | Refills: 0 | Status: SHIPPED | OUTPATIENT
Start: 2022-06-10 | End: 2022-07-08 | Stop reason: SDUPTHER

## 2022-07-08 DIAGNOSIS — G47.09 OTHER INSOMNIA: ICD-10-CM

## 2022-07-08 RX ORDER — ZOLPIDEM TARTRATE 10 MG/1
10 TABLET ORAL
Qty: 30 TABLET | Refills: 0 | Status: SHIPPED | OUTPATIENT
Start: 2022-07-08 | End: 2022-08-08 | Stop reason: SDUPTHER

## 2022-08-08 DIAGNOSIS — G47.09 OTHER INSOMNIA: ICD-10-CM

## 2022-08-08 RX ORDER — ZOLPIDEM TARTRATE 10 MG/1
10 TABLET ORAL
Qty: 30 TABLET | Refills: 0 | Status: SHIPPED | OUTPATIENT
Start: 2022-08-08 | End: 2022-09-06 | Stop reason: SDUPTHER

## 2022-08-17 ENCOUNTER — OFFICE VISIT (OUTPATIENT)
Dept: PODIATRY | Facility: CLINIC | Age: 72
End: 2022-08-17
Payer: MEDICARE

## 2022-08-17 VITALS — BODY MASS INDEX: 37.1 KG/M2 | HEIGHT: 71 IN | WEIGHT: 265 LBS

## 2022-08-17 DIAGNOSIS — L84 CALLUS: Primary | ICD-10-CM

## 2022-08-17 DIAGNOSIS — M79.672 PAIN IN BOTH FEET: ICD-10-CM

## 2022-08-17 DIAGNOSIS — M79.671 PAIN IN BOTH FEET: ICD-10-CM

## 2022-08-17 DIAGNOSIS — I73.9 PERIPHERAL ARTERIAL DISEASE (HCC): ICD-10-CM

## 2022-08-17 DIAGNOSIS — B35.1 ONYCHOMYCOSIS: ICD-10-CM

## 2022-08-17 PROCEDURE — 11056 PARNG/CUTG B9 HYPRKR LES 2-4: CPT | Performed by: PODIATRIST

## 2022-08-17 PROCEDURE — 11720 DEBRIDE NAIL 1-5: CPT | Performed by: PODIATRIST

## 2022-08-17 NOTE — PROGRESS NOTES
Noah Contreras  1950  AT RISK FOOT CARE    1  Callus     2  Pain in both feet     3  Peripheral arterial disease ([address] )     4  Onychomycosis         Patient presents for at-risk foot care  Patient has no acute concerns today  Patient has significant lower extremity risk due to neuropathy, parasthesia, edema, and trophic skin changes to the lower extremity  On exam patient has thickened, hypertrophic, discolored, brittle toenails with subungual debris and tenderness x4   Callus: 6  Patient has lower extremity edema  PAtients skin is atrophic, thickened nails, and decreased pedal hair  Patient has decreased pinprick and vibratory sensation to his feet and parasthesia    Today's treatment includes:  Debridement of toenails  Using nail nipper, jerica, and curette, nails were sharply debrided, reduced in thickness and length  Devitalized nail tissue and fungal debris excised and removed  Patient tolerated well  Discussed proper shoe gear, daily inspections of feet, and general foot health with patient  Patient has Q9  findings and is recommended for at risk foot care every 9-10 weeks      Patients most recent complete clinical foot exam was on: 6/8

## 2022-08-30 ENCOUNTER — RA CDI HCC (OUTPATIENT)
Dept: OTHER | Facility: HOSPITAL | Age: 72
End: 2022-08-30

## 2022-08-30 NOTE — PROGRESS NOTES
[address]  coding opportunities       Chart reviewed, no opportunity found: CHART REVIEWED, NO OPPORTUNITY FOUND        Patients Insurance     Medicare Insurance: Medicare

## 2022-09-06 DIAGNOSIS — E78.5 HYPERLIPIDEMIA, UNSPECIFIED HYPERLIPIDEMIA TYPE: ICD-10-CM

## 2022-09-06 DIAGNOSIS — G47.09 OTHER INSOMNIA: ICD-10-CM

## 2022-09-06 DIAGNOSIS — F41.8 ANXIETY ABOUT HEALTH: ICD-10-CM

## 2022-09-06 RX ORDER — PRAVASTATIN SODIUM 40 MG
40 TABLET ORAL DAILY
Qty: 90 TABLET | Refills: 1 | Status: SHIPPED | OUTPATIENT
Start: 2022-09-06

## 2022-09-06 RX ORDER — ZOLPIDEM TARTRATE 10 MG/1
10 TABLET ORAL
Qty: 30 TABLET | Refills: 0 | Status: SHIPPED | OUTPATIENT
Start: 2022-09-06 | End: 2022-09-09

## 2022-09-09 ENCOUNTER — OFFICE VISIT (OUTPATIENT)
Dept: INTERNAL MEDICINE CLINIC | Facility: CLINIC | Age: 72
End: 2022-09-09
Payer: MEDICARE

## 2022-09-09 VITALS
BODY MASS INDEX: 36.85 KG/M2 | DIASTOLIC BLOOD PRESSURE: 72 MMHG | HEART RATE: 94 BPM | SYSTOLIC BLOOD PRESSURE: 158 MMHG | TEMPERATURE: 97.7 F | WEIGHT: 263.25 LBS | HEIGHT: 71 IN | OXYGEN SATURATION: 93 %

## 2022-09-09 DIAGNOSIS — G47.09 OTHER INSOMNIA: ICD-10-CM

## 2022-09-09 DIAGNOSIS — Z12.5 SCREENING PSA (PROSTATE SPECIFIC ANTIGEN): ICD-10-CM

## 2022-09-09 DIAGNOSIS — E29.1 TESTICULAR HYPOGONADISM: ICD-10-CM

## 2022-09-09 DIAGNOSIS — Z13.29 SCREENING FOR THYROID DISORDER: ICD-10-CM

## 2022-09-09 DIAGNOSIS — Z13.0 SCREENING FOR DEFICIENCY ANEMIA: ICD-10-CM

## 2022-09-09 DIAGNOSIS — E55.9 VITAMIN D DEFICIENCY: ICD-10-CM

## 2022-09-09 DIAGNOSIS — E78.2 MIXED HYPERLIPIDEMIA: ICD-10-CM

## 2022-09-09 DIAGNOSIS — I10 PRIMARY HYPERTENSION: Primary | ICD-10-CM

## 2022-09-09 PROCEDURE — 99214 OFFICE O/P EST MOD 30 MIN: CPT | Performed by: PHYSICIAN ASSISTANT

## 2022-09-09 RX ORDER — LORATADINE 10 MG/1
10 TABLET ORAL DAILY
COMMUNITY

## 2022-09-09 RX ORDER — ESZOPICLONE 3 MG/1
3 TABLET, FILM COATED ORAL
Qty: 30 TABLET | Refills: 2 | Status: SHIPPED | OUTPATIENT
Start: 2022-09-09 | End: 2022-10-07 | Stop reason: SINTOL

## 2022-09-09 RX ORDER — ECHINACEA PURPUREA EXTRACT 125 MG
1 TABLET ORAL AS NEEDED
COMMUNITY

## 2022-09-09 NOTE — ASSESSMENT & PLAN NOTE
Discontinue ambien in favor of lunesta  Directions for use and possible side effects discussed and patient verbalized understanding of these

## 2022-09-09 NOTE — PROGRESS NOTES
Assessment/Plan:  Problem List Items Addressed This Visit        Cardiovascular and Mediastinum    Hypertension - Primary     Pts symptoms are stable with current regime  No changes at present  Relevant Orders    Comprehensive metabolic panel       Other    Other insomnia     Discontinue ambien in favor of lunesta  Directions for use and possible side effects discussed and patient verbalized understanding of these  Relevant Medications    eszopiclone (LUNESTA) 3 MG tablet    Hyperlipidemia    Relevant Orders    Lipid panel      Other Visit Diagnoses     Vitamin D deficiency        Relevant Orders    Vitamin D 25 hydroxy    Screening PSA (prostate specific antigen)        Relevant Orders    PSA, Total Screen    Screening for thyroid disorder        Relevant Orders    TSH, 3rd generation    Screening for deficiency anemia        Relevant Orders    CBC and differential    Testicular hypogonadism        Relevant Orders    Testosterone, free, total           Diagnoses and all orders for this visit:    Primary hypertension  -     Comprehensive metabolic panel; Future    Mixed hyperlipidemia  -     Lipid panel; Future    Other insomnia  -     eszopiclone (LUNESTA) 3 MG tablet; Take 1 tablet (3 mg total) by mouth daily at bedtime as needed for sleep Take immediately before bedtime    Vitamin D deficiency  -     Vitamin D 25 hydroxy; Future    Screening PSA (prostate specific antigen)  -     PSA, Total Screen; Future    Screening for thyroid disorder  -     TSH, 3rd generation; Future    Screening for deficiency anemia  -     CBC and differential; Future    Testicular hypogonadism  -     Testosterone, free, total; Future    Other orders  -     loratadine (CLARITIN) 10 mg tablet; Take 10 mg by mouth daily  -     sodium chloride (OCEAN) 0 65 % nasal spray; 1 spray into each nostril as needed for congestion      Hypertension  Pts symptoms are stable with current regime  No changes at present         Other insomnia  Discontinue ambien in favor of lunesta  Directions for use and possible side effects discussed and patient verbalized understanding of these  Subjective:      Patient ID: Ludin Mei is a 66 y o  male  Pt presents for routine visit  He is doing well overall  He is up to date with CRC screening, labs, and AWV  BP is controlled  His sinus issues have improved nicely with current regime  Insomnia remains an issue  Ambien helps but he still only gets about 6 hours  He previously tried and failed trazodone  He would be interested in trying an alternative  The following portions of the patient's history were reviewed and updated as appropriate:   He has a past medical history of Arthritis, Hyperlipidemia, Hypertension, and Obesity  ,  does not have any pertinent problems on file  ,   has a past surgical history that includes Knee surgery; Hand surgery (Bilateral); Wrist surgery; Elbow surgery; Colonoscopy; and Cardiac surgery  ,  family history includes Diabetes in his sister; Heart disease in his father and paternal grandmother; Kidney disease in his sister; Lupus in his mother; Rheum arthritis in his mother  ,   reports that he has quit smoking  His smoking use included cigarettes  He quit after 10 00 years of use  He has never used smokeless tobacco  He reports current alcohol use  He reports that he does not use drugs  ,  is allergic to other     Current Outpatient Medications   Medication Sig Dispense Refill    ALPRAZolam (XANAX) 0 25 mg tablet Take 1 tablet (0 25 mg total) by mouth daily at bedtime as needed for anxiety or sleep 30 tablet 1    aspirin 81 MG tablet Take 81 mg by mouth daily      eszopiclone (LUNESTA) 3 MG tablet Take 1 tablet (3 mg total) by mouth daily at bedtime as needed for sleep Take immediately before bedtime 30 tablet 2    fluticasone (FLONASE) 50 mcg/act nasal spray 1 spray into each nostril daily      loratadine (CLARITIN) 10 mg tablet Take 10 mg by mouth daily  losartan-hydrochlorothiazide (HYZAAR) 100-25 MG per tablet Take 1 tablet by mouth daily 90 tablet 3    metoprolol succinate (Toprol XL) 25 mg 24 hr tablet Take 1 tablet (25 mg total) by mouth daily 30 tablet 5    montelukast (SINGULAIR) 10 mg tablet Take 1 tablet (10 mg total) by mouth daily at bedtime 30 tablet 5    Omega-3 Fatty Acids (fish oil) 1,000 mg Take 1,000 mg by mouth daily      pravastatin (PRAVACHOL) 40 mg tablet Take 1 tablet (40 mg total) by mouth daily 90 tablet 1    sodium chloride (OCEAN) 0 65 % nasal spray 1 spray into each nostril as needed for congestion       No current facility-administered medications for this visit  Review of Systems   Constitutional: Negative for chills and fever  HENT: Positive for hearing loss  Negative for congestion, ear pain, postnasal drip, rhinorrhea, sinus pressure, sinus pain, sore throat and trouble swallowing  Eyes: Negative for pain and visual disturbance  Respiratory: Negative for cough, chest tightness, shortness of breath and wheezing  Cardiovascular: Negative  Negative for chest pain, palpitations and leg swelling  Gastrointestinal: Negative for abdominal pain, blood in stool, constipation, diarrhea, nausea and vomiting  Endocrine: Negative for cold intolerance, heat intolerance, polydipsia, polyphagia and polyuria  Genitourinary: Negative for difficulty urinating, dysuria, flank pain and urgency  Musculoskeletal: Negative for arthralgias, back pain, gait problem and myalgias  Skin: Negative for rash  Allergic/Immunologic: Negative  Neurological: Negative for dizziness, weakness, light-headedness and headaches  Hematological: Negative  Psychiatric/Behavioral: Positive for sleep disturbance  Negative for behavioral problems and dysphoric mood  The patient is not nervous/anxious            PHQ-2/9 Depression Screening            Objective:  Vitals:    09/09/22 0925   BP: 158/72   BP Location: Left arm   Patient Position: Sitting   Pulse: 94   Temp: 97 7 °F (36 5 °C)   SpO2: 93%   Weight: 119 kg (263 lb 4 oz)   Height: 5' 11" (1 803 m)     Body mass index is 36 72 kg/m²  Physical Exam  Constitutional:       General: He is not in acute distress  Appearance: He is well-developed  He is not diaphoretic  HENT:      Head: Normocephalic and atraumatic  Right Ear: External ear normal       Left Ear: External ear normal       Nose: Nose normal       Mouth/Throat:      Pharynx: No oropharyngeal exudate  Eyes:      General: No scleral icterus  Right eye: No discharge  Left eye: No discharge  Conjunctiva/sclera: Conjunctivae normal       Pupils: Pupils are equal, round, and reactive to light  Neck:      Thyroid: No thyromegaly  Cardiovascular:      Rate and Rhythm: Normal rate and regular rhythm  Heart sounds: Normal heart sounds  No murmur heard  No friction rub  No gallop  Pulmonary:      Effort: Pulmonary effort is normal  No respiratory distress  Breath sounds: Normal breath sounds  No wheezing or rales  Abdominal:      General: Bowel sounds are normal  There is no distension  Palpations: Abdomen is soft  Tenderness: There is no abdominal tenderness  Musculoskeletal:         General: No tenderness or deformity  Normal range of motion  Cervical back: Normal range of motion and neck supple  Skin:     General: Skin is warm and dry  Neurological:      Mental Status: He is alert and oriented to person, place, and time  Cranial Nerves: No cranial nerve deficit  Psychiatric:         Behavior: Behavior normal          Thought Content:  Thought content normal          Judgment: Judgment normal

## 2022-09-23 ENCOUNTER — TELEPHONE (OUTPATIENT)
Dept: INTERNAL MEDICINE CLINIC | Facility: CLINIC | Age: 72
End: 2022-09-23

## 2022-09-23 DIAGNOSIS — I10 ESSENTIAL HYPERTENSION: ICD-10-CM

## 2022-09-23 NOTE — TELEPHONE ENCOUNTER
Pt called he tried lunesta for 4 nights it not helping, he still had Ambien 10mg but that does not help, he asked for a stronger mg of lunesta

## 2022-10-03 RX ORDER — LOSARTAN POTASSIUM AND HYDROCHLOROTHIAZIDE 25; 100 MG/1; MG/1
1 TABLET ORAL DAILY
Qty: 90 TABLET | Refills: 3 | Status: SHIPPED | OUTPATIENT
Start: 2022-10-03

## 2022-10-07 DIAGNOSIS — G47.09 OTHER INSOMNIA: Primary | ICD-10-CM

## 2022-10-07 RX ORDER — ZOLPIDEM TARTRATE 10 MG/1
10 TABLET ORAL
COMMUNITY
End: 2022-10-07 | Stop reason: SDUPTHER

## 2022-10-07 RX ORDER — ZOLPIDEM TARTRATE 10 MG/1
10 TABLET ORAL
Qty: 30 TABLET | Refills: 0 | Status: SHIPPED | OUTPATIENT
Start: 2022-10-07

## 2022-11-09 DIAGNOSIS — G47.09 OTHER INSOMNIA: ICD-10-CM

## 2022-11-09 RX ORDER — ZOLPIDEM TARTRATE 10 MG/1
10 TABLET ORAL
Qty: 30 TABLET | Refills: 0 | Status: SHIPPED | OUTPATIENT
Start: 2022-11-09

## 2022-12-01 DIAGNOSIS — F41.8 ANXIETY ABOUT HEALTH: ICD-10-CM

## 2022-12-01 DIAGNOSIS — G47.09 OTHER INSOMNIA: ICD-10-CM

## 2022-12-02 RX ORDER — ALPRAZOLAM 0.25 MG/1
TABLET ORAL
Qty: 30 TABLET | Refills: 0 | Status: SHIPPED | OUTPATIENT
Start: 2022-12-02

## 2022-12-08 DIAGNOSIS — G47.09 OTHER INSOMNIA: ICD-10-CM

## 2022-12-08 RX ORDER — ZOLPIDEM TARTRATE 10 MG/1
10 TABLET ORAL
Qty: 30 TABLET | Refills: 0 | Status: SHIPPED | OUTPATIENT
Start: 2022-12-08

## 2022-12-12 DIAGNOSIS — I10 PRIMARY HYPERTENSION: ICD-10-CM

## 2022-12-12 RX ORDER — METOPROLOL SUCCINATE 25 MG/1
25 TABLET, EXTENDED RELEASE ORAL DAILY
Qty: 30 TABLET | Refills: 5 | Status: SHIPPED | OUTPATIENT
Start: 2022-12-12

## 2023-01-09 DIAGNOSIS — G47.09 OTHER INSOMNIA: ICD-10-CM

## 2023-01-10 RX ORDER — ZOLPIDEM TARTRATE 10 MG/1
10 TABLET ORAL
Qty: 30 TABLET | Refills: 0 | Status: SHIPPED | OUTPATIENT
Start: 2023-01-10

## 2023-01-19 ENCOUNTER — RA CDI HCC (OUTPATIENT)
Dept: OTHER | Facility: HOSPITAL | Age: 73
End: 2023-01-19

## 2023-01-24 ENCOUNTER — APPOINTMENT (OUTPATIENT)
Dept: LAB | Facility: CLINIC | Age: 73
End: 2023-01-24

## 2023-01-24 DIAGNOSIS — Z13.0 SCREENING FOR DEFICIENCY ANEMIA: ICD-10-CM

## 2023-01-24 DIAGNOSIS — E29.1 TESTICULAR HYPOGONADISM: ICD-10-CM

## 2023-01-24 DIAGNOSIS — E78.2 MIXED HYPERLIPIDEMIA: ICD-10-CM

## 2023-01-24 DIAGNOSIS — Z13.29 SCREENING FOR THYROID DISORDER: ICD-10-CM

## 2023-01-24 DIAGNOSIS — I10 PRIMARY HYPERTENSION: ICD-10-CM

## 2023-01-24 DIAGNOSIS — E55.9 VITAMIN D DEFICIENCY: ICD-10-CM

## 2023-01-24 DIAGNOSIS — Z12.5 SCREENING PSA (PROSTATE SPECIFIC ANTIGEN): ICD-10-CM

## 2023-01-24 LAB
25(OH)D3 SERPL-MCNC: 15.2 NG/ML (ref 30–100)
ALBUMIN SERPL BCP-MCNC: 3.7 G/DL (ref 3.5–5)
ALP SERPL-CCNC: 47 U/L (ref 46–116)
ALT SERPL W P-5'-P-CCNC: 47 U/L (ref 12–78)
ANION GAP SERPL CALCULATED.3IONS-SCNC: 5 MMOL/L (ref 4–13)
AST SERPL W P-5'-P-CCNC: 28 U/L (ref 5–45)
BASOPHILS # BLD AUTO: 0.05 THOUSANDS/ÂΜL (ref 0–0.1)
BASOPHILS NFR BLD AUTO: 1 % (ref 0–1)
BILIRUB SERPL-MCNC: 0.93 MG/DL (ref 0.2–1)
BUN SERPL-MCNC: 17 MG/DL (ref 5–25)
CALCIUM SERPL-MCNC: 8.7 MG/DL (ref 8.3–10.1)
CHLORIDE SERPL-SCNC: 105 MMOL/L (ref 96–108)
CHOLEST SERPL-MCNC: 140 MG/DL
CO2 SERPL-SCNC: 26 MMOL/L (ref 21–32)
CREAT SERPL-MCNC: 1.06 MG/DL (ref 0.6–1.3)
EOSINOPHIL # BLD AUTO: 0.27 THOUSAND/ÂΜL (ref 0–0.61)
EOSINOPHIL NFR BLD AUTO: 3 % (ref 0–6)
ERYTHROCYTE [DISTWIDTH] IN BLOOD BY AUTOMATED COUNT: 13.5 % (ref 11.6–15.1)
GFR SERPL CREATININE-BSD FRML MDRD: 68 ML/MIN/1.73SQ M
GLUCOSE P FAST SERPL-MCNC: 140 MG/DL (ref 65–99)
HCT VFR BLD AUTO: 46.8 % (ref 36.5–49.3)
HDLC SERPL-MCNC: 41 MG/DL
HGB BLD-MCNC: 15.6 G/DL (ref 12–17)
IMM GRANULOCYTES # BLD AUTO: 0.01 THOUSAND/UL (ref 0–0.2)
IMM GRANULOCYTES NFR BLD AUTO: 0 % (ref 0–2)
LDLC SERPL CALC-MCNC: 72 MG/DL (ref 0–100)
LYMPHOCYTES # BLD AUTO: 2.41 THOUSANDS/ÂΜL (ref 0.6–4.47)
LYMPHOCYTES NFR BLD AUTO: 29 % (ref 14–44)
MCH RBC QN AUTO: 31.3 PG (ref 26.8–34.3)
MCHC RBC AUTO-ENTMCNC: 33.3 G/DL (ref 31.4–37.4)
MCV RBC AUTO: 94 FL (ref 82–98)
MONOCYTES # BLD AUTO: 0.66 THOUSAND/ÂΜL (ref 0.17–1.22)
MONOCYTES NFR BLD AUTO: 8 % (ref 4–12)
NEUTROPHILS # BLD AUTO: 4.86 THOUSANDS/ÂΜL (ref 1.85–7.62)
NEUTS SEG NFR BLD AUTO: 59 % (ref 43–75)
NONHDLC SERPL-MCNC: 99 MG/DL
NRBC BLD AUTO-RTO: 0 /100 WBCS
PLATELET # BLD AUTO: 206 THOUSANDS/UL (ref 149–390)
PMV BLD AUTO: 11.6 FL (ref 8.9–12.7)
POTASSIUM SERPL-SCNC: 3.9 MMOL/L (ref 3.5–5.3)
PROT SERPL-MCNC: 7.1 G/DL (ref 6.4–8.4)
PSA SERPL-MCNC: 0.9 NG/ML (ref 0–4)
RBC # BLD AUTO: 4.99 MILLION/UL (ref 3.88–5.62)
SODIUM SERPL-SCNC: 136 MMOL/L (ref 135–147)
TRIGL SERPL-MCNC: 136 MG/DL
TSH SERPL DL<=0.05 MIU/L-ACNC: 1.32 UIU/ML (ref 0.45–4.5)
WBC # BLD AUTO: 8.26 THOUSAND/UL (ref 4.31–10.16)

## 2023-01-25 LAB
TESTOST FREE SERPL-MCNC: 10.6 PG/ML (ref 6.6–18.1)
TESTOST SERPL-MCNC: 671 NG/DL (ref 264–916)

## 2023-01-27 ENCOUNTER — OFFICE VISIT (OUTPATIENT)
Dept: INTERNAL MEDICINE CLINIC | Facility: CLINIC | Age: 73
End: 2023-01-27

## 2023-01-27 VITALS
HEART RATE: 95 BPM | OXYGEN SATURATION: 98 % | HEIGHT: 71 IN | TEMPERATURE: 98 F | DIASTOLIC BLOOD PRESSURE: 82 MMHG | SYSTOLIC BLOOD PRESSURE: 168 MMHG | BODY MASS INDEX: 37.69 KG/M2 | WEIGHT: 269.25 LBS

## 2023-01-27 DIAGNOSIS — E66.01 OBESITY, MORBID (HCC): ICD-10-CM

## 2023-01-27 DIAGNOSIS — I73.9 PERIPHERAL ARTERIAL DISEASE (HCC): ICD-10-CM

## 2023-01-27 DIAGNOSIS — J30.2 SEASONAL ALLERGIES: Primary | ICD-10-CM

## 2023-01-27 DIAGNOSIS — Z13.1 SCREENING FOR DIABETES MELLITUS: ICD-10-CM

## 2023-01-27 DIAGNOSIS — R73.01 ELEVATED FASTING GLUCOSE: ICD-10-CM

## 2023-01-27 LAB — SL AMB POCT HEMOGLOBIN AIC: 5.8 (ref ?–6.5)

## 2023-01-27 NOTE — PROGRESS NOTES
Name: Daniel Chow      :       MRN: [id number]  Encounter Provider: Nathaniel Love PA-C  Encounter Date: 2023   Encounter department: [address] [place] [address]  Seasonal allergies  Assessment & Plan:  Continue flonase  ENT referral provided for second opinion due to chronicity of symptoms    Orders:  -     Ambulatory Referral to Otolaryngology; Future    2  Peripheral arterial disease (HCC)    3  Obesity, morbid ([address] )    4  Screening for diabetes mellitus    5  Elevated fasting glucose  -     POCT hemoglobin A1c      BMI Counseling: Body mass index is 37 55 kg/m²  The BMI is above normal  Nutrition recommendations include decreasing portion sizes, consuming healthier snacks and limiting drinks that contain sugar  Rationale for BMI follow-up plan is due to patient being overweight or obese  Subjective      Pt presents for routine visit  He is up to date with labs which were reviewed in detail with him  Systolic BP is elevated but he admits he just took his medication  His A1C remains well controlled with diet  He is interested in seeing ENT regarding chronic rhinitis  Review of Systems   Constitutional: Negative for chills and fever  HENT: Positive for postnasal drip and rhinorrhea  Negative for congestion, ear pain, hearing loss, sinus pressure, sinus pain, sore throat and trouble swallowing  Eyes: Negative for pain and visual disturbance  Respiratory: Negative for cough, chest tightness, shortness of breath and wheezing  Cardiovascular: Negative  Negative for chest pain, palpitations and leg swelling  Gastrointestinal: Negative for abdominal pain, blood in stool, constipation, diarrhea, nausea and vomiting  Endocrine: Negative for cold intolerance, heat intolerance, polydipsia, polyphagia and polyuria  Genitourinary: Negative for difficulty urinating, dysuria, flank pain and urgency  Musculoskeletal: Positive for arthralgias  Negative for back pain, gait problem and myalgias  Skin: Negative for rash  Allergic/Immunologic: Negative  Neurological: Negative for dizziness, weakness, light-headedness and headaches  Hematological: Negative  Psychiatric/Behavioral: Negative for behavioral problems, dysphoric mood and sleep disturbance  The patient is not nervous/anxious  Current Outpatient Medications on File Prior to Visit   Medication Sig   • ALPRAZolam (XANAX) 0 25 mg tablet TAKE 1 TABLET BY MOUTH ONCE DAILY AT BEDTIME AS NEEDED FOR ANXIETY OR SLEEP   • aspirin 81 MG tablet Take 81 mg by mouth daily   • fluticasone (FLONASE) 50 mcg/act nasal spray 1 spray into each nostril daily   • loratadine (CLARITIN) 10 mg tablet Take 10 mg by mouth daily   • losartan-hydrochlorothiazide (HYZAAR) 100-25 MG per tablet Take 1 tablet by mouth daily   • metoprolol succinate (Toprol XL) 25 mg 24 hr tablet Take 1 tablet (25 mg total) by mouth daily   • montelukast (SINGULAIR) 10 mg tablet Take 1 tablet (10 mg total) by mouth daily at bedtime   • pravastatin (PRAVACHOL) 40 mg tablet Take 1 tablet (40 mg total) by mouth daily   • sodium chloride (OCEAN) 0 65 % nasal spray 1 spray into each nostril as needed for congestion   • zolpidem (AMBIEN) 10 mg tablet Take 1 tablet (10 mg total) by mouth daily at bedtime as needed for sleep   • [DISCONTINUED] Omega-3 Fatty Acids (fish oil) 1,000 mg Take 1,000 mg by mouth daily (Patient not taking: Reported on 1/27/2023)       Objective     /82 (BP Location: Left arm, Patient Position: Sitting)   Pulse 95   Temp 98 °F (36 7 °C)   Ht 5' 11" (1 803 m)   Wt 122 kg (269 lb 4 oz)   SpO2 98%   BMI 37 55 kg/m²     Physical Exam  Vitals and nursing note reviewed  Constitutional:       General: He is not in acute distress  Appearance: He is well-developed  He is obese  He is not diaphoretic  HENT:      Head: Normocephalic and atraumatic        Right Ear: External ear normal       Left Ear: External ear normal       Nose: Congestion and rhinorrhea present  Mouth/Throat:      Pharynx: No oropharyngeal exudate  Eyes:      General: No scleral icterus  Right eye: No discharge  Left eye: No discharge  Conjunctiva/sclera: Conjunctivae normal       Pupils: Pupils are equal, round, and reactive to light  Neck:      Thyroid: No thyromegaly  Cardiovascular:      Rate and Rhythm: Normal rate and regular rhythm  Heart sounds: Normal heart sounds  No murmur heard  No friction rub  No gallop  Pulmonary:      Effort: Pulmonary effort is normal  No respiratory distress  Breath sounds: Normal breath sounds  No wheezing or rales  Abdominal:      General: Bowel sounds are normal  There is no distension  Palpations: Abdomen is soft  Tenderness: There is no abdominal tenderness  Musculoskeletal:         General: No tenderness or deformity  Normal range of motion  Cervical back: Normal range of motion and neck supple  Skin:     General: Skin is warm and dry  Neurological:      Mental Status: He is alert and oriented to person, place, and time  Cranial Nerves: No cranial nerve deficit  Psychiatric:         Behavior: Behavior normal          Thought Content:  Thought content normal          Judgment: Judgment normal        Eva Smart PA-C

## 2023-01-28 ENCOUNTER — APPOINTMENT (EMERGENCY)
Dept: RADIOLOGY | Facility: HOSPITAL | Age: 73
End: 2023-01-28

## 2023-01-28 ENCOUNTER — HOSPITAL ENCOUNTER (EMERGENCY)
Facility: HOSPITAL | Age: 73
Discharge: HOME | End: 2023-01-28
Attending: EMERGENCY MEDICINE | Admitting: EMERGENCY MEDICINE

## 2023-01-28 VITALS
BODY MASS INDEX: 36.4 KG/M2 | OXYGEN SATURATION: 94 % | RESPIRATION RATE: 16 BRPM | WEIGHT: 260 LBS | HEART RATE: 64 BPM | DIASTOLIC BLOOD PRESSURE: 87 MMHG | SYSTOLIC BLOOD PRESSURE: 124 MMHG | TEMPERATURE: 98 F | HEIGHT: 71 IN

## 2023-01-28 DIAGNOSIS — J18.9 PNEUMONIA: Primary | ICD-10-CM

## 2023-01-28 LAB
ALBUMIN SERPL BCP-MCNC: 4.1 G/DL (ref 3.5–5)
ALP SERPL-CCNC: 41 U/L (ref 34–104)
ALT SERPL W P-5'-P-CCNC: 29 U/L (ref 7–52)
ANION GAP SERPL CALCULATED.3IONS-SCNC: 7 MMOL/L (ref 4–13)
AST SERPL W P-5'-P-CCNC: 22 U/L (ref 13–39)
ATRIAL RATE: 105 BPM
BASOPHILS # BLD AUTO: 0.03 THOUSANDS/ÂΜL (ref 0–0.1)
BASOPHILS NFR BLD AUTO: 0 % (ref 0–1)
BILIRUB SERPL-MCNC: 0.62 MG/DL (ref 0.2–1)
BUN SERPL-MCNC: 17 MG/DL (ref 5–25)
CALCIUM SERPL-MCNC: 8.5 MG/DL (ref 8.4–10.2)
CHLORIDE SERPL-SCNC: 103 MMOL/L (ref 96–108)
CO2 SERPL-SCNC: 25 MMOL/L (ref 21–32)
CREAT SERPL-MCNC: 0.93 MG/DL (ref 0.6–1.3)
EOSINOPHIL # BLD AUTO: 0.38 THOUSAND/ÂΜL (ref 0–0.61)
EOSINOPHIL NFR BLD AUTO: 3 % (ref 0–6)
ERYTHROCYTE [DISTWIDTH] IN BLOOD BY AUTOMATED COUNT: 13.6 % (ref 11.6–15.1)
FLUAV RNA RESP QL NAA+PROBE: NEGATIVE
FLUBV RNA RESP QL NAA+PROBE: NEGATIVE
GFR SERPL CREATININE-BSD FRML MDRD: 80 ML/MIN/1.73SQ M
GLUCOSE SERPL-MCNC: 120 MG/DL (ref 65–140)
HCT VFR BLD AUTO: 45.9 % (ref 36.5–49.3)
HGB BLD-MCNC: 15.2 G/DL (ref 12–17)
IMM GRANULOCYTES # BLD AUTO: 0.03 THOUSAND/UL (ref 0–0.2)
IMM GRANULOCYTES NFR BLD AUTO: 0 % (ref 0–2)
LYMPHOCYTES # BLD AUTO: 1.35 THOUSANDS/ÂΜL (ref 0.6–4.47)
LYMPHOCYTES NFR BLD AUTO: 12 % (ref 14–44)
MCH RBC QN AUTO: 31.9 PG (ref 26.8–34.3)
MCHC RBC AUTO-ENTMCNC: 33.1 G/DL (ref 31.4–37.4)
MCV RBC AUTO: 96 FL (ref 82–98)
MONOCYTES # BLD AUTO: 0.87 THOUSAND/ÂΜL (ref 0.17–1.22)
MONOCYTES NFR BLD AUTO: 8 % (ref 4–12)
NEUTROPHILS # BLD AUTO: 8.95 THOUSANDS/ÂΜL (ref 1.85–7.62)
NEUTS SEG NFR BLD AUTO: 77 % (ref 43–75)
NRBC BLD AUTO-RTO: 0 /100 WBCS
P AXIS: 74 DEGREES
PLATELET # BLD AUTO: 151 THOUSANDS/UL (ref 149–390)
PMV BLD AUTO: 11.3 FL (ref 8.9–12.7)
POTASSIUM SERPL-SCNC: 3.8 MMOL/L (ref 3.5–5.3)
PR INTERVAL: 160 MS
PROT SERPL-MCNC: 7 G/DL (ref 6.4–8.4)
QRS AXIS: -11 DEGREES
QRSD INTERVAL: 140 MS
QT INTERVAL: 394 MS
QTC INTERVAL: 520 MS
RBC # BLD AUTO: 4.77 MILLION/UL (ref 3.88–5.62)
RSV RNA RESP QL NAA+PROBE: NEGATIVE
SARS-COV-2 RNA RESP QL NAA+PROBE: NEGATIVE
SODIUM SERPL-SCNC: 135 MMOL/L (ref 135–147)
T WAVE AXIS: 45 DEGREES
VENTRICULAR RATE: 105 BPM
WBC # BLD AUTO: 11.61 THOUSAND/UL (ref 4.31–10.16)

## 2023-01-28 RX ORDER — DOXYCYCLINE HYCLATE 100 MG/1
100 CAPSULE ORAL ONCE
Status: COMPLETED | OUTPATIENT
Start: 2023-01-28 | End: 2023-01-28

## 2023-01-28 RX ORDER — DOXYCYCLINE HYCLATE 100 MG/1
100 CAPSULE ORAL 2 TIMES DAILY
Qty: 20 CAPSULE | Refills: 0 | Status: SHIPPED | OUTPATIENT
Start: 2023-01-28 | End: 2023-01-30

## 2023-01-28 RX ORDER — CEFPODOXIME PROXETIL 200 MG/1
200 TABLET, FILM COATED ORAL ONCE
Status: COMPLETED | OUTPATIENT
Start: 2023-01-28 | End: 2023-01-28

## 2023-01-28 RX ORDER — CEFPODOXIME PROXETIL 200 MG/1
200 TABLET, FILM COATED ORAL 2 TIMES DAILY
Qty: 14 TABLET | Refills: 0 | Status: SHIPPED | OUTPATIENT
Start: 2023-01-28 | End: 2023-01-30

## 2023-01-28 RX ORDER — CEFPODOXIME PROXETIL 200 MG/1
200 TABLET, FILM COATED ORAL 2 TIMES DAILY WITH MEALS
Status: DISCONTINUED | OUTPATIENT
Start: 2023-01-28 | End: 2023-01-28

## 2023-01-28 RX ORDER — ACETAMINOPHEN 325 MG/1
650 TABLET ORAL ONCE
Status: COMPLETED | OUTPATIENT
Start: 2023-01-28 | End: 2023-01-28

## 2023-01-28 RX ADMIN — CEFPODOXIME PROXETIL 200 MG: 200 TABLET, FILM COATED ORAL at 16:12

## 2023-01-28 RX ADMIN — ACETAMINOPHEN 650 MG: 325 TABLET ORAL at 13:52

## 2023-01-28 RX ADMIN — DOXYCYCLINE 100 MG: 100 CAPSULE ORAL at 15:45

## 2023-01-28 NOTE — DISCHARGE INSTRUCTIONS
Return for O2 saturation below 90%  Follow up with your PCP  Return for onset of shortness of breath, dizziness, chest pain  Follow up with your PCP

## 2023-01-28 NOTE — ED PROVIDER NOTES
History  Chief Complaint   Patient presents with   • Cough   • Nasal Congestion     C/o cough and nose congestion denies CP or SOB  Is a 27-year-old male with past medical history of hypertension, high cholesterol arriving for evaluation of cough and nasal congestion  Patient reports that he has some degree of nasal congestion intermittently throughout the year  Patient states that his last bout of nasal congestion was 3 weeks ago  Patient states that he normally does not have a cough with this  Patient states that he feels like he has a raspy to his cough that started this morning  Pt denies any fevers or chills  Patient states that he does not feel short of breath when he is walking, or talking  Patient reports bringing up white sputum  Patient denies any swelling of his legs  Patient denies any chest pain  Patient states that he has been following with his PCP, but today is worse due to cough  Patient states that he does have follow-up with ENT tomorrow  Prior to Admission Medications   Prescriptions Last Dose Informant Patient Reported? Taking?    ALPRAZolam (XANAX) 0 25 mg tablet   No No   Sig: TAKE 1 TABLET BY MOUTH ONCE DAILY AT BEDTIME AS NEEDED FOR ANXIETY OR SLEEP   aspirin 81 MG tablet   Yes No   Sig: Take 81 mg by mouth daily   fluticasone (FLONASE) 50 mcg/act nasal spray   Yes No   Si spray into each nostril daily   loratadine (CLARITIN) 10 mg tablet   Yes No   Sig: Take 10 mg by mouth daily   losartan-hydrochlorothiazide (HYZAAR) 100-25 MG per tablet   No No   Sig: Take 1 tablet by mouth daily   metoprolol succinate (Toprol XL) 25 mg 24 hr tablet   No No   Sig: Take 1 tablet (25 mg total) by mouth daily   montelukast (SINGULAIR) 10 mg tablet   No No   Sig: Take 1 tablet (10 mg total) by mouth daily at bedtime   pravastatin (PRAVACHOL) 40 mg tablet   No No   Sig: Take 1 tablet (40 mg total) by mouth daily   sodium chloride (OCEAN) 0 65 % nasal spray   Yes No   Si spray into each nostril as needed for congestion   zolpidem (AMBIEN) 10 mg tablet   No No   Sig: Take 1 tablet (10 mg total) by mouth daily at bedtime as needed for sleep      Facility-Administered Medications: None       Past Medical History:   Diagnosis Date   • Arthritis    • Hyperlipidemia    • Hypertension    • Obesity        Past Surgical History:   Procedure Laterality Date   • CARDIAC SURGERY      heart catherization   • COLONOSCOPY     • ELBOW SURGERY     • HAND SURGERY Bilateral    • KNEE SURGERY     • WRIST SURGERY         Family History   Problem Relation Age of Onset   • Rheum arthritis Mother    • Lupus Mother    • Heart disease Father    • Diabetes Sister    • Kidney disease Sister    • Heart disease Paternal Grandmother      I have reviewed and agree with the history as documented  E-Cigarette/Vaping   • E-Cigarette Use Never User      E-Cigarette/Vaping Substances   • Nicotine No    • THC No    • CBD No    • Flavoring No    • Other No    • Unknown No      Social History     Tobacco Use   • Smoking status: Former     Years: 10 00     Types: Cigarettes   • Smokeless tobacco: Never   Vaping Use   • Vaping Use: Never used   Substance Use Topics   • Alcohol use: Yes     Comment: SOCIAL   • Drug use: No       Review of Systems   Constitutional: Negative  HENT: Positive for congestion and sinus pressure  Negative for dental problem, drooling, ear discharge, ear pain, facial swelling, hearing loss, mouth sores, nosebleeds, postnasal drip, rhinorrhea, sinus pain, sneezing, sore throat, tinnitus, trouble swallowing and voice change  Eyes: Negative  Respiratory: Positive for choking  Negative for apnea, cough, chest tightness, shortness of breath, wheezing and stridor  Cardiovascular: Negative  Gastrointestinal: Negative  Endocrine: Negative  Genitourinary: Negative  Musculoskeletal: Negative  Skin: Negative  Allergic/Immunologic: Negative  Neurological: Negative      Hematological: Negative  Psychiatric/Behavioral: Negative  All other systems reviewed and are negative  Physical Exam  Physical Exam  Vitals and nursing note reviewed  Constitutional:       General: He is not in acute distress  Appearance: Normal appearance  He is normal weight  He is not ill-appearing or toxic-appearing  HENT:      Head: Normocephalic  Right Ear: External ear normal       Left Ear: External ear normal       Nose: Nose normal       Mouth/Throat:      Mouth: Mucous membranes are moist    Eyes:      Extraocular Movements: Extraocular movements intact  Conjunctiva/sclera: Conjunctivae normal       Pupils: Pupils are equal, round, and reactive to light  Cardiovascular:      Rate and Rhythm: Normal rate and regular rhythm  Pulses: Normal pulses  Heart sounds: Normal heart sounds  Pulmonary:      Effort: Pulmonary effort is normal  No respiratory distress  Breath sounds: Normal breath sounds  No stridor  No wheezing, rhonchi or rales  Chest:      Chest wall: No tenderness  Abdominal:      General: Abdomen is flat  Bowel sounds are normal  There is no distension  Palpations: Abdomen is soft  There is no mass  Tenderness: There is no abdominal tenderness  Hernia: No hernia is present  Musculoskeletal:         General: Normal range of motion  Cervical back: Normal range of motion  Skin:     General: Skin is warm  Capillary Refill: Capillary refill takes less than 2 seconds  Neurological:      General: No focal deficit present  Mental Status: He is alert and oriented to person, place, and time  Mental status is at baseline     Psychiatric:         Mood and Affect: Mood normal          Vital Signs  ED Triage Vitals [01/28/23 1243]   Temperature Pulse Respirations Blood Pressure SpO2   98 °F (36 7 °C) (!) 110 22 161/91 90 %      Temp Source Heart Rate Source Patient Position - Orthostatic VS BP Location FiO2 (%)   Oral Monitor Lying Left arm --      Pain Score       No Pain           Vitals:    01/28/23 1243 01/28/23 1352 01/28/23 1400 01/28/23 1546   BP: 161/91 124/87 124/87    Pulse: (!) 110  100 64   Patient Position - Orthostatic VS: Lying            Visual Acuity      ED Medications  Medications   acetaminophen (TYLENOL) tablet 650 mg (650 mg Oral Given 1/28/23 1352)   doxycycline hyclate (VIBRAMYCIN) capsule 100 mg (100 mg Oral Given 1/28/23 1545)   cefpodoxime (VANTIN) tablet 200 mg (200 mg Oral Given 1/28/23 1612)       Diagnostic Studies  Results Reviewed     Procedure Component Value Units Date/Time    FLU/RSV/COVID - if FLU/RSV clinically relevant [[id number]]  (Normal) Collected: 01/28/23 1349    Lab Status: Final result Specimen: Nares from Nose Updated: 01/28/23 1431     SARS-CoV-2 Negative     INFLUENZA A PCR Negative     INFLUENZA B PCR Negative     RSV PCR Negative    Narrative:      FOR PEDIATRIC PATIENTS - copy/paste COVID Guidelines URL to browser: [email or web address]  [employer]    SARS-CoV-2 assay is a Nucleic Acid Amplification assay intended for the  qualitative detection of nucleic acid from SARS-CoV-2 in nasopharyngeal  swabs  Results are for the presumptive identification of SARS-CoV-2 RNA  Positive results are indicative of infection with SARS-CoV-2, the virus  causing COVID-19, but do not rule out bacterial infection or co-infection  with other viruses  Laboratories within the [place] and its  territories are required to report all positive results to the appropriate  public health authorities  Negative results do not preclude SARS-CoV-2  infection and should not be used as the sole basis for treatment or other  patient management decisions  Negative results must be combined with  clinical observations, patient history, and epidemiological information  This test has not been FDA cleared or approved      This test has been authorized by FDA under an Emergency Use Authorization  (EUA)  This test is only authorized for the duration of time the  declaration that circumstances exist justifying the authorization of the  emergency use of an in vitro diagnostic tests for detection of SARS-CoV-2  virus and/or diagnosis of COVID-19 infection under section 564(b)(1) of  the Act, 21 U  S C  873OGN-3(I)(6), unless the authorization is terminated  or revoked sooner  The test has been validated but independent review by FDA  and CLIA is pending  Test performed using [employer] GeneXpert: This RT-PCR assay targets N2,  a region unique to SARS-CoV-2  A conserved region in the E-gene was chosen  for pan-Sarbecovirus detection which includes SARS-CoV-2  According to CMS-2020-01-R, this platform meets the definition of high-throughput technology      Comprehensive metabolic panel [[id number]] Collected: 01/28/23 1349    Lab Status: Final result Specimen: Blood from Hand, Right Updated: 01/28/23 1417     Sodium 135 mmol/L      Potassium 3 8 mmol/L      Chloride 103 mmol/L      CO2 25 mmol/L      ANION GAP 7 mmol/L      BUN 17 mg/dL      Creatinine 0 93 mg/dL      Glucose 120 mg/dL      Calcium 8 5 mg/dL      AST 22 U/L      ALT 29 U/L      Alkaline Phosphatase 41 U/L      Total Protein 7 0 g/dL      Albumin 4 1 g/dL      Total Bilirubin 0 62 mg/dL      eGFR 80 ml/min/1 73sq m     Narrative:      Blake guidelines for Chronic Kidney Disease (CKD):   •  Stage 1 with normal or high GFR (GFR > 90 mL/min/1 73 square meters)  •  Stage 2 Mild CKD (GFR = 60-89 mL/min/1 73 square meters)  •  Stage 3A Moderate CKD (GFR = 45-59 mL/min/1 73 square meters)  •  Stage 3B Moderate CKD (GFR = 30-44 mL/min/1 73 square meters)  •  Stage 4 Severe CKD (GFR = 15-29 mL/min/1 73 square meters)  •  Stage 5 End Stage CKD (GFR <15 mL/min/1 73 square meters)  Note: GFR calculation is accurate only with a steady state creatinine    CBC and differential [[id number]]  (Abnormal) Collected: 01/28/23 1349    Lab Status: Final result Specimen: Blood from Hand, Right Updated: 01/28/23 1354     WBC 11 61 Thousand/uL      RBC 4 77 Million/uL      Hemoglobin 15 2 g/dL      Hematocrit 45 9 %      MCV 96 fL      MCH 31 9 pg      MCHC 33 1 g/dL      RDW 13 6 %      MPV 11 3 fL      Platelets 821 Thousands/uL      nRBC 0 /100 WBCs      Neutrophils Relative 77 %      Immat GRANS % 0 %      Lymphocytes Relative 12 %      Monocytes Relative 8 %      Eosinophils Relative 3 %      Basophils Relative 0 %      Neutrophils Absolute 8 95 Thousands/µL      Immature Grans Absolute 0 03 Thousand/uL      Lymphocytes Absolute 1 35 Thousands/µL      Monocytes Absolute 0 87 Thousand/µL      Eosinophils Absolute 0 38 Thousand/µL      Basophils Absolute 0 03 Thousands/µL                  XR chest 1 view portable   Final Result by Gerardo Fagan MD (01/28 1404)      Left lingula infiltrate and/or chronic pleural reactive change  The study was marked in [facility] for immediate notification  Workstation performed: [id number]                    Procedures  Procedures         ED Course  ED Course as of 02/01/23 0050   Sat Jan 28, 2023   1422 WBC(!): 11 61  Mild leukocytosis  1 FLU/RSV/COVID - if FLU/RSV clinically relevant                               SBIRT 22yo+    Flowsheet Row Most Recent Value   SBIRT (25 yo +)    In order to provide better care to our patients, we are screening all of our patients for alcohol and drug use  Would it be okay to ask you these screening questions? Yes Filed at: 01/28/2023 1445   Initial Alcohol Screen: US AUDIT-C     1  How often do you have a drink containing alcohol? 0 Filed at: 01/28/2023 1445   2  How many drinks containing alcohol do you have on a typical day you are drinking? 0 Filed at: 01/28/2023 1445   3a  Male UNDER 65: How often do you have five or more drinks on one occasion? 0 Filed at: 01/28/2023 1445   3b  FEMALE Any Age, or MALE 65+:  How often do you have 4 or more drinks on one occassion? 0 Filed at: 01/28/2023 1445   Audit-C Score 0 Filed at: 01/28/2023 1445   JOSUE: How many times in the past year have you    Used an illegal drug or used a prescription medication for non-medical reasons? Never Filed at: 01/28/2023 1445                    Medical Decision Making  DDx: pneumonia, covid/flu/rsv, sinusitis   Pt reports that he has had intermittent nasal congestion and post-nasal drip issues for many years  Pt states last bout was three weeks ago  Pt came to ED reporting cough that started today which is a change to his symptoms  Will check basic labs, chest xray, covid/flu/rsv  Pt is asked multiple times regarding any shortness of breath or chest pain be it at rest or during activity and pt states no  Pt states has follow up call Monday with ENT  As noted mild leukocyotis, no anemia, no ADI, no electrolyte abnormalities  Pneumonia noted on chest xray  Will treat given return of symptoms from three weeks ago  COVID/FLU/RSV negative  Pt has O2 sat from 90-94%  Per nursing staff 91% was the lowest he went with ambulation trial  Pt offered admission for pneumonia anyway, and declines  Pt states comfortable going home with PO abx for pneumonia  Pulse ox provided with return parameters  Discussed in front of wife  Pt again states no shortness of breath when asked  Reviewed to have a low threshold for return  Aside from age, no other CURB-65 criteria met at this time  Pt stable for discharge  Aware will need to follow up with PCP to ensure resolution  Reviewed reasons to return to ed  Patient verbalized understanding of diagnosis and agreement with discharge plan of care as well as understanding of reasons to return to ed  Pneumonia: acute illness or injury  Amount and/or Complexity of Data Reviewed  Labs: ordered  Decision-making details documented in ED Course  Radiology: ordered  Risk  OTC drugs  Prescription drug management            Disposition  Final diagnoses: Pneumonia     Time reflects when diagnosis was documented in both MDM as applicable and the Disposition within this note     Time User Action Codes Description Comment    1/28/2023  3:09 PM Sanjuanita Nolasco Add [J18 [id number]] Pneumonia       ED Disposition     ED Disposition   Discharge    Condition   --    Date/Time   Sat Jan 28, 2023  4:14 PM    Comment   [facility] discharge to home/self care                 Follow-up Information     Follow up With Specialties Details Why Contact Info Additional Information    Merry Fisher PA-C Family Medicine, Internal Medicine, Physician Assistant Schedule an appointment as soon as possible for a visit in 2 days For further evaluation of symptoms [address] [facility] [address] [facility] Department Emergency Medicine Go to  For further evaluation of symptoms [address] [place] [id number]  [facility], [address] [place] [address], [place], [address]          Discharge Medication List as of 1/28/2023  3:23 PM      START taking these medications    Details   cefpodoxime (VANTIN) 200 mg tablet Take 1 tablet (200 mg total) by mouth 2 (two) times a day for 7 days, Starting Sat 1/28/2023, Until Sat 2/4/2023, Normal      doxycycline hyclate (VIBRAMYCIN) 100 mg capsule Take 1 capsule (100 mg total) by mouth 2 (two) times a day for 5 days, Starting Sat 1/28/2023, Until u 2/2/2023, Normal         CONTINUE these medications which have NOT CHANGED    Details   ALPRAZolam (XANAX) 0 25 mg tablet TAKE 1 TABLET BY MOUTH ONCE DAILY AT BEDTIME AS NEEDED FOR ANXIETY OR SLEEP, Normal      aspirin 81 MG tablet Take 81 mg by mouth daily, Historical Med      fluticasone (FLONASE) 50 mcg/act nasal spray 1 spray into each nostril daily, Historical Med      loratadine (CLARITIN) 10 mg tablet Take 10 mg by mouth daily, Historical Med      losartan-hydrochlorothiazide (HYZAAR) 100-25 MG per tablet Take 1 tablet by mouth daily, Starting Mon 10/3/2022, Normal      metoprolol succinate (Toprol XL) 25 mg 24 hr tablet Take 1 tablet (25 mg total) by mouth daily, Starting Mon 12/12/2022, Normal      montelukast (SINGULAIR) 10 mg tablet Take 1 tablet (10 mg total) by mouth daily at bedtime, Starting Thu 5/26/2022, Normal      pravastatin (PRAVACHOL) 40 mg tablet Take 1 tablet (40 mg total) by mouth daily, Starting Tue 9/6/2022, Normal      sodium chloride (OCEAN) 0 65 % nasal spray 1 spray into each nostril as needed for congestion, Historical Med      zolpidem (AMBIEN) 10 mg tablet Take 1 tablet (10 mg total) by mouth daily at bedtime as needed for sleep, Starting Tue 1/10/2023, Normal             No discharge procedures on file      PDMP Review       Value Time User    PDMP Reviewed  Yes 4/15/2021 10:49 AM Paul Sanchez, [facility] Provider  Electronically Signed by           MARIA ANTONIA Lovell  02/01/23 [id number]

## 2023-01-29 ENCOUNTER — HOSPITAL ENCOUNTER (OUTPATIENT)
Facility: HOSPITAL | Age: 73
Setting detail: OBSERVATION
Discharge: HOME | End: 2023-01-30
Attending: EMERGENCY MEDICINE | Admitting: HOSPITALIST

## 2023-01-29 ENCOUNTER — APPOINTMENT (EMERGENCY)
Dept: CT IMAGING | Facility: HOSPITAL | Age: 73
End: 2023-01-29

## 2023-01-29 DIAGNOSIS — J40 BRONCHITIS: ICD-10-CM

## 2023-01-29 DIAGNOSIS — J96.90 RESPIRATORY FAILURE (HCC): Primary | ICD-10-CM

## 2023-01-29 DIAGNOSIS — R09.02 HYPOXIA: ICD-10-CM

## 2023-01-29 DIAGNOSIS — R06.2 WHEEZING: ICD-10-CM

## 2023-01-29 PROBLEM — R06.89 ACUTE RESPIRATORY INSUFFICIENCY: Status: ACTIVE | Noted: 2023-01-29

## 2023-01-29 LAB
ALBUMIN SERPL BCP-MCNC: 4 G/DL (ref 3.5–5)
ALP SERPL-CCNC: 43 U/L (ref 34–104)
ALT SERPL W P-5'-P-CCNC: 26 U/L (ref 7–52)
ANION GAP SERPL CALCULATED.3IONS-SCNC: 7 MMOL/L (ref 4–13)
APTT PPP: 30 SECONDS (ref 23–37)
AST SERPL W P-5'-P-CCNC: 21 U/L (ref 13–39)
BASOPHILS # BLD AUTO: 0.03 THOUSANDS/ÂΜL (ref 0–0.1)
BASOPHILS NFR BLD AUTO: 0 % (ref 0–1)
BILIRUB SERPL-MCNC: 0.57 MG/DL (ref 0.2–1)
BILIRUB UR QL STRIP: NEGATIVE
BUN SERPL-MCNC: 16 MG/DL (ref 5–25)
CALCIUM SERPL-MCNC: 8.2 MG/DL (ref 8.4–10.2)
CHLORIDE SERPL-SCNC: 104 MMOL/L (ref 96–108)
CLARITY UR: CLEAR
CO2 SERPL-SCNC: 27 MMOL/L (ref 21–32)
COLOR UR: NORMAL
CREAT SERPL-MCNC: 1.02 MG/DL (ref 0.6–1.3)
EOSINOPHIL # BLD AUTO: 0.55 THOUSAND/ÂΜL (ref 0–0.61)
EOSINOPHIL NFR BLD AUTO: 6 % (ref 0–6)
ERYTHROCYTE [DISTWIDTH] IN BLOOD BY AUTOMATED COUNT: 13.5 % (ref 11.6–15.1)
GFR SERPL CREATININE-BSD FRML MDRD: 72 ML/MIN/1.73SQ M
GLUCOSE SERPL-MCNC: 118 MG/DL (ref 65–140)
GLUCOSE UR STRIP-MCNC: NEGATIVE MG/DL
HCT VFR BLD AUTO: 45.8 % (ref 36.5–49.3)
HGB BLD-MCNC: 15 G/DL (ref 12–17)
HGB UR QL STRIP.AUTO: NEGATIVE
IMM GRANULOCYTES # BLD AUTO: 0.03 THOUSAND/UL (ref 0–0.2)
IMM GRANULOCYTES NFR BLD AUTO: 0 % (ref 0–2)
INR PPP: 1.03 (ref 0.84–1.19)
KETONES UR STRIP-MCNC: NEGATIVE MG/DL
LACTATE SERPL-SCNC: 1.2 MMOL/L (ref 0.5–2)
LEUKOCYTE ESTERASE UR QL STRIP: NEGATIVE
LYMPHOCYTES # BLD AUTO: 1.84 THOUSANDS/ÂΜL (ref 0.6–4.47)
LYMPHOCYTES NFR BLD AUTO: 22 % (ref 14–44)
MCH RBC QN AUTO: 31.5 PG (ref 26.8–34.3)
MCHC RBC AUTO-ENTMCNC: 32.8 G/DL (ref 31.4–37.4)
MCV RBC AUTO: 96 FL (ref 82–98)
MONOCYTES # BLD AUTO: 0.7 THOUSAND/ÂΜL (ref 0.17–1.22)
MONOCYTES NFR BLD AUTO: 8 % (ref 4–12)
NEUTROPHILS # BLD AUTO: 5.38 THOUSANDS/ÂΜL (ref 1.85–7.62)
NEUTS SEG NFR BLD AUTO: 64 % (ref 43–75)
NITRITE UR QL STRIP: NEGATIVE
NRBC BLD AUTO-RTO: 0 /100 WBCS
PH UR STRIP.AUTO: 5 [PH]
PLATELET # BLD AUTO: 170 THOUSANDS/UL (ref 149–390)
PMV BLD AUTO: 11.2 FL (ref 8.9–12.7)
POTASSIUM SERPL-SCNC: 3.6 MMOL/L (ref 3.5–5.3)
PROCALCITONIN SERPL-MCNC: 0.07 NG/ML
PROT SERPL-MCNC: 6.8 G/DL (ref 6.4–8.4)
PROT UR STRIP-MCNC: NEGATIVE MG/DL
PROTHROMBIN TIME: 13.5 SECONDS (ref 11.6–14.5)
RBC # BLD AUTO: 4.76 MILLION/UL (ref 3.88–5.62)
SODIUM SERPL-SCNC: 138 MMOL/L (ref 135–147)
SP GR UR STRIP.AUTO: 1.01
UROBILINOGEN UR QL STRIP.AUTO: 0.2 E.U./DL
WBC # BLD AUTO: 8.53 THOUSAND/UL (ref 4.31–10.16)

## 2023-01-29 RX ORDER — ASPIRIN 81 MG/1
81 TABLET ORAL DAILY
Status: DISCONTINUED | OUTPATIENT
Start: 2023-01-29 | End: 2023-01-30 | Stop reason: HOSPADM

## 2023-01-29 RX ORDER — LEVALBUTEROL 1.25 MG/.5ML
1.25 SOLUTION, CONCENTRATE RESPIRATORY (INHALATION)
Status: DISCONTINUED | OUTPATIENT
Start: 2023-01-29 | End: 2023-01-30 | Stop reason: HOSPADM

## 2023-01-29 RX ORDER — HYDROCHLOROTHIAZIDE 25 MG/1
25 TABLET ORAL DAILY
Status: DISCONTINUED | OUTPATIENT
Start: 2023-01-29 | End: 2023-01-30 | Stop reason: HOSPADM

## 2023-01-29 RX ORDER — METHYLPREDNISOLONE SODIUM SUCCINATE 125 MG/2ML
80 INJECTION, POWDER, LYOPHILIZED, FOR SOLUTION INTRAMUSCULAR; INTRAVENOUS ONCE
Status: COMPLETED | OUTPATIENT
Start: 2023-01-29 | End: 2023-01-29

## 2023-01-29 RX ORDER — IPRATROPIUM BROMIDE AND ALBUTEROL SULFATE 2.5; .5 MG/3ML; MG/3ML
3 SOLUTION RESPIRATORY (INHALATION) ONCE
Status: COMPLETED | OUTPATIENT
Start: 2023-01-29 | End: 2023-01-29

## 2023-01-29 RX ORDER — DOXYCYCLINE HYCLATE 100 MG/1
100 CAPSULE ORAL EVERY 12 HOURS
Status: DISCONTINUED | OUTPATIENT
Start: 2023-01-29 | End: 2023-01-30 | Stop reason: HOSPADM

## 2023-01-29 RX ORDER — HEPARIN SODIUM 5000 [USP'U]/ML
5000 INJECTION, SOLUTION INTRAVENOUS; SUBCUTANEOUS EVERY 8 HOURS SCHEDULED
Status: DISCONTINUED | OUTPATIENT
Start: 2023-01-29 | End: 2023-01-30 | Stop reason: HOSPADM

## 2023-01-29 RX ORDER — ACETAMINOPHEN 325 MG/1
650 TABLET ORAL EVERY 6 HOURS PRN
Status: DISCONTINUED | OUTPATIENT
Start: 2023-01-29 | End: 2023-01-30 | Stop reason: HOSPADM

## 2023-01-29 RX ORDER — METHYLPREDNISOLONE SODIUM SUCCINATE 40 MG/ML
40 INJECTION, POWDER, LYOPHILIZED, FOR SOLUTION INTRAMUSCULAR; INTRAVENOUS EVERY 8 HOURS
Status: DISCONTINUED | OUTPATIENT
Start: 2023-01-29 | End: 2023-01-30 | Stop reason: HOSPADM

## 2023-01-29 RX ORDER — ZOLPIDEM TARTRATE 5 MG/1
5 TABLET ORAL
Status: DISCONTINUED | OUTPATIENT
Start: 2023-01-29 | End: 2023-01-30 | Stop reason: HOSPADM

## 2023-01-29 RX ORDER — SODIUM CHLORIDE FOR INHALATION 0.9 %
3 VIAL, NEBULIZER (ML) INHALATION
Status: DISCONTINUED | OUTPATIENT
Start: 2023-01-29 | End: 2023-01-30 | Stop reason: HOSPADM

## 2023-01-29 RX ORDER — PRAVASTATIN SODIUM 40 MG
40 TABLET ORAL
Status: DISCONTINUED | OUTPATIENT
Start: 2023-01-29 | End: 2023-01-30 | Stop reason: HOSPADM

## 2023-01-29 RX ORDER — ECHINACEA PURPUREA EXTRACT 125 MG
1 TABLET ORAL
Status: DISCONTINUED | OUTPATIENT
Start: 2023-01-29 | End: 2023-01-30 | Stop reason: HOSPADM

## 2023-01-29 RX ORDER — MONTELUKAST SODIUM 10 MG/1
10 TABLET ORAL
Status: DISCONTINUED | OUTPATIENT
Start: 2023-01-29 | End: 2023-01-30 | Stop reason: HOSPADM

## 2023-01-29 RX ORDER — FLUTICASONE PROPIONATE 50 MCG
1 SPRAY, SUSPENSION (ML) NASAL DAILY
Status: DISCONTINUED | OUTPATIENT
Start: 2023-01-29 | End: 2023-01-30 | Stop reason: HOSPADM

## 2023-01-29 RX ORDER — LORATADINE 10 MG/1
10 TABLET ORAL DAILY
Status: DISCONTINUED | OUTPATIENT
Start: 2023-01-29 | End: 2023-01-30 | Stop reason: HOSPADM

## 2023-01-29 RX ORDER — METOPROLOL SUCCINATE 25 MG/1
25 TABLET, EXTENDED RELEASE ORAL DAILY
Status: DISCONTINUED | OUTPATIENT
Start: 2023-01-29 | End: 2023-01-30 | Stop reason: HOSPADM

## 2023-01-29 RX ORDER — LOSARTAN POTASSIUM 50 MG/1
100 TABLET ORAL DAILY
Status: DISCONTINUED | OUTPATIENT
Start: 2023-01-29 | End: 2023-01-30 | Stop reason: HOSPADM

## 2023-01-29 RX ADMIN — ISODIUM CHLORIDE 3 ML: 0.03 SOLUTION RESPIRATORY (INHALATION) at 07:44

## 2023-01-29 RX ADMIN — LORATADINE 10 MG: 10 TABLET ORAL at 08:05

## 2023-01-29 RX ADMIN — METHYLPREDNISOLONE SODIUM SUCCINATE 80 MG: 125 INJECTION, POWDER, FOR SOLUTION INTRAMUSCULAR; INTRAVENOUS at 04:15

## 2023-01-29 RX ADMIN — FLUTICASONE PROPIONATE 1 SPRAY: 50 SPRAY, METERED NASAL at 08:03

## 2023-01-29 RX ADMIN — LEVALBUTEROL HYDROCHLORIDE 1.25 MG: 1.25 SOLUTION, CONCENTRATE RESPIRATORY (INHALATION) at 13:15

## 2023-01-29 RX ADMIN — HEPARIN SODIUM 5000 UNITS: 5000 INJECTION INTRAVENOUS; SUBCUTANEOUS at 14:53

## 2023-01-29 RX ADMIN — SODIUM CHLORIDE 1000 ML: 0.9 INJECTION, SOLUTION INTRAVENOUS at 02:21

## 2023-01-29 RX ADMIN — IPRATROPIUM BROMIDE AND ALBUTEROL SULFATE 3 ML: 2.5; .5 SOLUTION RESPIRATORY (INHALATION) at 02:21

## 2023-01-29 RX ADMIN — PRAVASTATIN SODIUM 40 MG: 20 TABLET ORAL at 17:50

## 2023-01-29 RX ADMIN — METHYLPREDNISOLONE SODIUM SUCCINATE 40 MG: 40 INJECTION, POWDER, FOR SOLUTION INTRAMUSCULAR; INTRAVENOUS at 12:22

## 2023-01-29 RX ADMIN — HYDROCHLOROTHIAZIDE 25 MG: 25 TABLET ORAL at 08:04

## 2023-01-29 RX ADMIN — MONTELUKAST 10 MG: 10 TABLET, FILM COATED ORAL at 21:34

## 2023-01-29 RX ADMIN — METHYLPREDNISOLONE SODIUM SUCCINATE 40 MG: 40 INJECTION, POWDER, FOR SOLUTION INTRAMUSCULAR; INTRAVENOUS at 21:34

## 2023-01-29 RX ADMIN — IOHEXOL 100 ML: 350 INJECTION, SOLUTION INTRAVENOUS at 03:50

## 2023-01-29 RX ADMIN — HEPARIN SODIUM 5000 UNITS: 5000 INJECTION INTRAVENOUS; SUBCUTANEOUS at 07:08

## 2023-01-29 RX ADMIN — ISODIUM CHLORIDE 3 ML: 0.03 SOLUTION RESPIRATORY (INHALATION) at 20:02

## 2023-01-29 RX ADMIN — ASPIRIN 81 MG: 81 TABLET, COATED ORAL at 08:04

## 2023-01-29 RX ADMIN — METHYLPREDNISOLONE SODIUM SUCCINATE 40 MG: 40 INJECTION, POWDER, FOR SOLUTION INTRAMUSCULAR; INTRAVENOUS at 05:00

## 2023-01-29 RX ADMIN — LEVALBUTEROL HYDROCHLORIDE 1.25 MG: 1.25 SOLUTION, CONCENTRATE RESPIRATORY (INHALATION) at 20:02

## 2023-01-29 RX ADMIN — DOXYCYCLINE 100 MG: 100 CAPSULE ORAL at 05:00

## 2023-01-29 RX ADMIN — METOPROLOL SUCCINATE 25 MG: 50 TABLET, EXTENDED RELEASE ORAL at 08:05

## 2023-01-29 RX ADMIN — DOXYCYCLINE 100 MG: 100 CAPSULE ORAL at 16:14

## 2023-01-29 RX ADMIN — LOSARTAN POTASSIUM 100 MG: 50 TABLET, FILM COATED ORAL at 08:05

## 2023-01-29 RX ADMIN — ISODIUM CHLORIDE 3 ML: 0.03 SOLUTION RESPIRATORY (INHALATION) at 13:15

## 2023-01-29 RX ADMIN — HEPARIN SODIUM 5000 UNITS: 5000 INJECTION INTRAVENOUS; SUBCUTANEOUS at 21:34

## 2023-01-29 RX ADMIN — ZOLPIDEM TARTRATE 5 MG: 5 TABLET ORAL at 22:44

## 2023-01-29 RX ADMIN — LEVALBUTEROL HYDROCHLORIDE 1.25 MG: 1.25 SOLUTION, CONCENTRATE RESPIRATORY (INHALATION) at 07:44

## 2023-01-29 RX ADMIN — IPRATROPIUM BROMIDE AND ALBUTEROL SULFATE 3 ML: 2.5; .5 SOLUTION RESPIRATORY (INHALATION) at 04:16

## 2023-01-29 NOTE — H&P
[address] [facility] 1950, 74 y o  male MRN: [id number]  Unit/Bed#: ED 02 Encounter: [id number]  Primary Care Provider: Wendy Nickerson PA-C   Date and time admitted to hospital: 1/29/2023  1:58 AM    * Acute respiratory insufficiency  Assessment & Plan  · Placed in observation medicine  · Unclear etiology  · Patient is on no respiratory medications at home, has no respiratory diagnosis is not currently following with pulmonary  · O2 saturation as low as 88% on room air and is now requiring 2 L nasal cannula to maintain O2 saturation at 94%  · Patient does have a remote smoking history 1 to 2 packs a day approximately 4 to 5 years while in his teenage years  · Imaging only revealed bronchitis  · Will place on O2 and respiratory protocol  · Give Solu-Medrol 40 mg IV every 8 hours  · Give Xopenex and Atrovent nebs every 8 hours  · Obtain sputum culture and gram stain  · Trend lactic acid and procalcitonin  · Give Vibramycin 100 mg p o  every 12 hours  · Consult pulmonary    Seasonal allergies  Assessment & Plan  Continue prehospital Singulair 10 mg p o  nightly and Claritin 10 mg p o  daily    Obesity, morbid (HCC)  Assessment & Plan  Encourage healthy weight loss and supportive care    Hypertension  Assessment & Plan  Continue prehospital Toprol-XL 25 mg p o  daily, Cozaar 100 mg p o  daily and hydrochlorothiazide 25 mg p o  daily    Hyperlipidemia  Assessment & Plan  Continue prehospital Pravachol 40 mg p o  nightly      VTE Prophylaxis: Heparin  Code Status: Level 1  POLST: There is no POLST form on file for this patient (pre-hospital)  Discussion with family: Wife who was present at bedside at time of exam, diagnosis and treatment plan were reviewed and all questions answered to their satisfaction    Anticipated Length of Stay:  Patient will be admitted on an Observation basis with an anticipated length of stay of  < 2 midnights     Justification for Hospital Stay: Acute respiratory insufficiency O2 support, IV steroids, frequent breathing treatments and further pulmonary evaluation    Chief Complaint:   Cough and shortness of breath x1 day    History of Present Illness:    Sarita Wood is a 74 y o  male who presents with cough and shortness of breath x2 days  Patient with a history of similar allergies pending ENT evaluation presents the ER the second time in the past 24 hours for further evaluation and treatment of his 1 day history of shortness of breath accompanied by cough productive of whitish sputum  Discharge from first ER visit patient was diagnosed with pneumonia and sent home with a prescription for antibiotics which he has not picked up yet  When patient went to go to bed he stated that his shortness of breath increased upon arrival in ER was noted to be hypoxic with an O2 saturation 88%  Patient denies any known respiratory history, he is not on any respiratory medications and does not currently follow with pulmonary  Patient denies any fever or chills, no recent sick contacts he states that he is dyspnea is worse when laying down than with activity  Review of Systems:  Review of Systems   Constitutional: Negative for chills and fever  Respiratory: Positive for cough and shortness of breath  Negative for wheezing  Cardiovascular: Negative for chest pain and palpitations  Gastrointestinal: Negative for abdominal pain, diarrhea, nausea and vomiting  Genitourinary: Negative for dysuria, frequency, hematuria and urgency  Neurological: Negative for weakness, light-headedness and headaches  All other systems reviewed and are negative        Past Medical and Surgical History:   Past Medical History:   Diagnosis Date   • Arthritis    • Hyperlipidemia    • Hypertension    • Obesity        Past Surgical History:   Procedure Laterality Date   • CARDIAC SURGERY      heart catherization   • COLONOSCOPY     • ELBOW SURGERY     • HAND SURGERY Bilateral • KNEE SURGERY     • WRIST SURGERY         Meds/Allergies:  Prior to Admission medications    Medication Sig Start Date End Date Taking? Authorizing Provider   ALPRAZolam (XANAX) 0 25 mg tablet TAKE 1 TABLET BY MOUTH ONCE DAILY AT BEDTIME AS NEEDED FOR ANXIETY OR SLEEP 12/2/22   Eva Smart PA-C   aspirin 81 MG tablet Take 81 mg by mouth daily    Historical Provider, MD   cefpodoxime (VANTIN) 200 mg tablet Take 1 tablet (200 mg total) by mouth 2 (two) times a day for 7 days 1/28/23 2/4/23  MARIA ANTONIA Christine   doxycycline hyclate (VIBRAMYCIN) 100 mg capsule Take 1 capsule (100 mg total) by mouth 2 (two) times a day for 5 days 1/28/23 2/2/23  MARIA ANTONIA Christine   fluticasone (FLONASE) 50 mcg/act nasal spray 1 spray into each nostril daily    Historical Provider, MD   loratadine (CLARITIN) 10 mg tablet Take 10 mg by mouth daily    Historical Provider, MD   losartan-hydrochlorothiazide (HYZAAR) 100-25 MG per tablet Take 1 tablet by mouth daily 10/3/22   Eva Smart PA-C   metoprolol succinate (Toprol XL) 25 mg 24 hr tablet Take 1 tablet (25 mg total) by mouth daily 12/12/22   Eva Smart PA-C   montelukast (SINGULAIR) 10 mg tablet Take 1 tablet (10 mg total) by mouth daily at bedtime 5/26/22   Eva Smart PA-C   pravastatin (PRAVACHOL) 40 mg tablet Take 1 tablet (40 mg total) by mouth daily 9/6/22   Eva Smart PA-C   sodium chloride (OCEAN) 0 65 % nasal spray 1 spray into each nostril as needed for congestion    Historical Provider, MD   zolpidem (AMBIEN) 10 mg tablet Take 1 tablet (10 mg total) by mouth daily at bedtime as needed for sleep 1/10/23   Aurora Smith PA-C     I have reviewed home medications with patient personally  Allergies:    Allergies   Allergen Reactions   • Other      Summertime grass, weeds       Social History:  Marital Status: /Civil Union   Occupation: Retired printer  Patient Pre-hospital Living Situation: Resides at home with wife  Patient Pre-hospital Level of Mobility: Full without assist  Patient Pre-hospital Diet Restrictions: None    Social History     Substance and Sexual Activity   Alcohol Use Yes    Comment: SOCIAL     Social History     Tobacco Use   Smoking Status Former   • Years: 10 00   • Types: Cigarettes   Smokeless Tobacco Never     Social History     Substance and Sexual Activity   Drug Use No       Family History:  I have reviewed the patients family history    Physical Exam:   Vitals:   Blood Pressure: 154/73 (01/29/23 0515)  Pulse: 94 (01/29/23 0515)  Temperature: 97 9 °F (36 6 °C) (01/29/23 0203)  Temp Source: Oral (01/29/23 0203)  Respirations: (!) 24 (01/29/23 0415)  Height: 5' 11" (180 3 cm) (01/29/23 0203)  Weight - Scale: 118 kg (260 lb) (01/29/23 0203)  SpO2: (!) 89 % (01/29/23 0515)    Physical Exam  Vitals and nursing note reviewed  Constitutional:       General: He is not in acute distress  Appearance: Normal appearance  He is obese  HENT:      Head: Normocephalic and atraumatic  Right Ear: Tympanic membrane normal       Left Ear: Tympanic membrane normal       Nose: Nose normal       Mouth/Throat:      Mouth: Mucous membranes are moist       Pharynx: No oropharyngeal exudate or posterior oropharyngeal erythema  Eyes:      Extraocular Movements: Extraocular movements intact  Pupils: Pupils are equal, round, and reactive to light  Cardiovascular:      Rate and Rhythm: Normal rate and regular rhythm  Pulses: Normal pulses  Heart sounds: Normal heart sounds  Pulmonary:      Effort: Pulmonary effort is normal  No respiratory distress  Breath sounds: Normal breath sounds  Abdominal:      General: Abdomen is flat  Bowel sounds are normal       Palpations: Abdomen is soft  Musculoskeletal:         General: Normal range of motion  Cervical back: Normal range of motion and neck supple  Right lower leg: No edema  Left lower leg: No edema     Skin:     General: Skin is warm and dry  Capillary Refill: Capillary refill takes less than 2 seconds  Neurological:      General: No focal deficit present  Mental Status: He is alert and oriented to person, place, and time  Additional Data:   Lab Results: I have personally reviewed pertinent reports  Results from last 7 days   Lab Units 01/29/23  0215   WBC Thousand/uL 8 53   HEMOGLOBIN g/dL 15 0   HEMATOCRIT % 45 8   PLATELETS Thousands/uL 170   NEUTROS PCT % 64   LYMPHS PCT % 22   MONOS PCT % 8   EOS PCT % 6     Results from last 7 days   Lab Units 01/29/23  0215   SODIUM mmol/L 138   POTASSIUM mmol/L 3 6   CHLORIDE mmol/L 104   CO2 mmol/L 27   BUN mg/dL 16   CREATININE mg/dL 1 02   CALCIUM mg/dL 8 2*   ALK PHOS U/L 43   ALT U/L 26   AST U/L 21     Results from last 7 days   Lab Units 01/29/23  0215   INR  1 03         Results from last 7 days   Lab Units 01/27/23  0846   HEMOGLOBIN A1C  5 8       Imaging: I have personally reviewed pertinent reports  CTA ED chest PE Study   Final Result by Randal Serrano DO (01/29 0401)      No acute pulmonary embolus  Bilateral lower lobe bronchial wall thickening, suggestive of bronchitis  Workstation performed: [id number]             EKG, Pathology, and Other Studies Reviewed on Admission:   · EKG: N/A    Epic Records Reviewed: Yes     ** Please Note: This note has been constructed using a voice recognition system   **

## 2023-01-29 NOTE — CONSULTS
Pulmonary Consultation   Marline Tejada 74 y o  male MRN: [id number]   ED 02 Encounter: [id number]      Reason for consultation:   Hypoxia      Requesting physician:   Leo Haskins MD      Impressions:   • Acute hypoxemic respiratory failure  • Acute bronchitis  • Obesity  Recommendations:  • Titrate oxygen to maintain O2 saturation above 88%  • Agree with the Solu-Medrol  • Transition to p o  prednisone 40 milligrams with a taper by 10 mg every 3 days  • Albuterol rescue inhaler 2 inhalations 4 times a day as needed on discharge  History of Present Illness   HPI:  Marline Tejada is a 74 y o  male who was in his usual state of health until a month 2 days ago  He started having a scratchy throat  This was followed by cough and tachypnea  The patient presented to the emergency room where he was evaluated and diagnosed with pneumonia  He was prescribed antibiotics and was discharged home  On the patient laid down he became tachypneic  He became anxious and decided to come back to the emergency room  On reevaluation he was found to have O2 saturation of 88%  He was placed on oxygen and had further evaluation with CT of the chest   The patient denies fever or chills  He Has good appetite throughout  Denies chest pain or palpitations  He has no history of chronic lung disease  Review of systems:  12 point review of systems was completed and was otherwise negative except as listed in HPI        Historical Information   Past Medical History:   Diagnosis Date   • Arthritis    • Hyperlipidemia    • Hypertension    • Obesity      Past Surgical History:   Procedure Laterality Date   • CARDIAC SURGERY      heart catherization   • COLONOSCOPY     • ELBOW SURGERY     • HAND SURGERY Bilateral    • KNEE SURGERY     • WRIST SURGERY       Family History   Problem Relation Age of Onset   • Rheum arthritis Mother    • Lupus Mother    • Heart disease Father    • Diabetes Sister    • Kidney disease Sister    • Heart disease Paternal Grandmother        Family History:  No family history of chronic lung disease  Social History:  The patient is  and lives with his wife  He has about 8-pack-year smoking history  and quit 50 years ago  Meds/Allergies   Current Facility-Administered Medications   Medication Dose Route Frequency   • acetaminophen (TYLENOL) tablet 650 mg  650 mg Oral Q6H PRN   • aspirin (ECOTRIN LOW STRENGTH) EC tablet 81 mg  81 mg Oral Daily   • doxycycline hyclate (VIBRAMYCIN) capsule 100 mg  100 mg Oral Q12H   • fluticasone (FLONASE) 50 mcg/act nasal spray 1 spray  1 spray Nasal Daily   • heparin (porcine) subcutaneous injection 5,000 Units  5,000 Units Subcutaneous Q8H [facility]   • losartan (COZAAR) tablet 100 mg  100 mg Oral Daily    And   • hydrochlorothiazide (HYDRODIURIL) tablet 25 mg  25 mg Oral Daily   • levalbuterol (XOPENEX) inhalation solution 1 25 mg  1 25 mg Nebulization TID    And   • sodium chloride 0 9 % inhalation solution 3 mL  3 mL Nebulization TID   • loratadine (CLARITIN) tablet 10 mg  10 mg Oral Daily   • methylPREDNISolone sodium succinate (Solu-MEDROL) injection 40 mg  40 mg Intravenous Q8H   • metoprolol succinate (TOPROL-XL) 24 hr tablet 25 mg  25 mg Oral Daily   • montelukast (SINGULAIR) tablet 10 mg  10 mg Oral HS   • pravastatin (PRAVACHOL) tablet 40 mg  40 mg Oral After Dinner   • sodium chloride (OCEAN) 0 65 % nasal spray 1 spray  1 spray Each Nare Q1H PRN     (Not in a hospital admission)    Allergies   Allergen Reactions   • Other      Summertime grass, weeds       Vitals: Blood pressure 116/64, pulse 104, temperature 97 9 °F (36 6 °C), temperature source Oral, resp   rate 20, height 5' 11" (1 803 m), weight 118 kg (260 lb), SpO2 92 % ,    No intake or output data in the 24 hours ending 01/29/23 1340    Physical exam:        Head/eyes:    Normocephalic, without obvious abnormality, atraumatic,         PERRL, extraocular muscles intact, no scleral icterus Nose:   Nares normal, septum midline, mucosa normal, no drainage    or sinus tenderness   Throat:   Moist mucous membranes, no thrush   Neck:   Supple, trachea midline, no adenopathy; no carotid    bruit or JVD   Lungs:    Clear breath sounds  No wheezing or rhonchi  Heart:    Regular rate and rhythm, S1 and S2 normal, no murmur, rub   or gallop   Abdomen:     Soft, non-tender, bowel sounds active all four quadrants,     no masses, no organomegaly   Extremities:   Extremities normal, atraumatic, no cyanosis  1+ edema   Skin:   Warm, dry, turgor normal, no rashes or lesions   Neurologic:   CNII-XII intact, normal strength, non-focal             Labs:   CBC:   Lab Results   Component Value Date    WBC 8 53 01/29/2023    HGB 15 0 01/29/2023    HCT 45 8 01/29/2023    MCV 96 01/29/2023     01/29/2023    MCH 31 5 01/29/2023    MCHC 32 8 01/29/2023    RDW 13 5 01/29/2023    MPV 11 2 01/29/2023    NRBC 0 01/29/2023   , CMP:   Lab Results   Component Value Date    SODIUM 138 01/29/2023    K 3 6 01/29/2023     01/29/2023    CO2 27 01/29/2023    BUN 16 01/29/2023    CREATININE 1 02 01/29/2023    CALCIUM 8 2 (L) 01/29/2023    AST 21 01/29/2023    ALT 26 01/29/2023    ALKPHOS 43 01/29/2023    EGFR 72 01/29/2023       Imaging and other studies: I have personally reviewed pertinent films in PACS CT of the chest is reviewed on the PACS system  It showed no acute pulmonary embolism  No evidence of pneumonia  Atelectatic changes in the lower lobes          Wyatt Longoria MD

## 2023-01-29 NOTE — PLAN OF CARE
Problem: Potential for Falls  Goal: Patient will remain free of falls  Description: INTERVENTIONS:  - Educate patient/family on patient safety including physical limitations  - Instruct patient to call for assistance with activity   - Consult OT/PT to assist with strengthening/mobility   - Keep Call bell within reach  - Keep bed low and locked with side rails adjusted as appropriate  - Keep care items and personal belongings within reach  - Initiate and maintain comfort rounds  - Make Fall Risk Sign visible to staff  - Offer Toileting every 1 Hours, in advance of need  - Initiate/Maintain alarm  - Obtain necessary fall risk management equipment:   - Apply yellow socks and bracelet for high fall risk patients  - Consider moving patient to room near nurses station  Outcome: Progressing     Problem: PAIN - ADULT  Goal: Verbalizes/displays adequate comfort level or baseline comfort level  Description: Interventions:  - Encourage patient to monitor pain and request assistance  - Assess pain using appropriate pain scale  - Administer analgesics based on type and severity of pain and evaluate response  - Implement non-pharmacological measures as appropriate and evaluate response  - Consider cultural and social influences on pain and pain management  - Notify physician/advanced practitioner if interventions unsuccessful or patient reports new pain  Outcome: Progressing     Problem: INFECTION - ADULT  Goal: Absence or prevention of progression during hospitalization  Description: INTERVENTIONS:  - Assess and monitor for signs and symptoms of infection  - Monitor lab/diagnostic results  - Monitor all insertion sites, i e  indwelling lines, tubes, and drains  - Monitor endotracheal if appropriate and nasal secretions for changes in amount and color  - Rupert appropriate cooling/warming therapies per order  - Administer medications as ordered  - Instruct and encourage patient and family to use good hand hygiene technique  - Identify and instruct in appropriate isolation precautions for identified infection/condition  Outcome: Progressing  Goal: Absence of fever/infection during neutropenic period  Description: INTERVENTIONS:  - Monitor WBC    Outcome: Progressing     Problem: SAFETY ADULT  Goal: Patient will remain free of falls  Description: INTERVENTIONS:  - Educate patient/family on patient safety including physical limitations  - Instruct patient to call for assistance with activity   - Consult OT/PT to assist with strengthening/mobility   - Keep Call bell within reach  - Keep bed low and locked with side rails adjusted as appropriate  - Keep care items and personal belongings within reach  - Initiate and maintain comfort rounds  - Make Fall Risk Sign visible to staff  - Offer Toileting every  Hours, in advance of need  - Initiate/Maintain alarm  - Obtain necessary fall risk management equipment:   - Apply yellow socks and bracelet for high fall risk patients  - Consider moving patient to room near nurses station  Outcome: Progressing  Goal: Maintain or return to baseline ADL function  Description: INTERVENTIONS:  -  Assess patient's ability to carry out ADLs; assess patient's baseline for ADL function and identify physical deficits which impact ability to perform ADLs (bathing, care of mouth/teeth, toileting, grooming, dressing, etc )  - Assess/evaluate cause of self-care deficits   - Assess range of motion  - Assess patient's mobility; develop plan if impaired  - Assess patient's need for assistive devices and provide as appropriate  - Encourage maximum independence but intervene and supervise when necessary  - Involve family in performance of ADLs  - Assess for home care needs following discharge   - Consider OT consult to assist with ADL evaluation and planning for discharge  - Provide patient education as appropriate  Outcome: Progressing  Goal: Maintains/Returns to pre admission functional level  Description: INTERVENTIONS:  - Perform BMAT or MOVE assessment daily    - Set and communicate daily mobility goal to care team and patient/family/caregiver  - Collaborate with rehabilitation services on mobility goals if consulted  - Perform Range of Motion  times a day  - Reposition patient every  hours  - Dangle patient  times a day  - Stand patient  times a day  - Ambulate patient  times a day  - Out of bed to chair  times a day   - Out of bed for meals times a day  - Out of bed for toileting  - Record patient progress and toleration of activity level   Outcome: Progressing     Problem: DISCHARGE PLANNING  Goal: Discharge to home or other facility with appropriate resources  Description: INTERVENTIONS:  - Identify barriers to discharge w/patient and caregiver  - Arrange for needed discharge resources and transportation as appropriate  - Identify discharge learning needs (meds, wound care, etc )  - Arrange for interpretive services to assist at discharge as needed  - Refer to Case Management Department for coordinating discharge planning if the patient needs post-hospital services based on physician/advanced practitioner order or complex needs related to functional status, cognitive ability, or social support system  Outcome: Progressing     Problem: Knowledge Deficit  Goal: Patient/family/caregiver demonstrates understanding of disease process, treatment plan, medications, and discharge instructions  Description: Complete learning assessment and assess knowledge base    Interventions:  - Provide teaching at level of understanding  - Provide teaching via preferred learning methods  Outcome: Progressing     Problem: RESPIRATORY - ADULT  Goal: Achieves optimal ventilation and oxygenation  Description: INTERVENTIONS:  - Assess for changes in respiratory status  - Assess for changes in mentation and behavior  - Position to facilitate oxygenation and minimize respiratory effort  - Oxygen administered by appropriate delivery if ordered  - Initiate smoking cessation education as indicated  - Encourage broncho-pulmonary hygiene including cough, deep breathe, Incentive Spirometry  - Assess the need for suctioning and aspirate as needed  - Assess and instruct to report SOB or any respiratory difficulty  - Respiratory Therapy support as indicated  Outcome: Progressing

## 2023-01-29 NOTE — ASSESSMENT & PLAN NOTE
Continue prehospital Toprol-XL 25 mg p o  daily, Cozaar 100 mg p o  daily and hydrochlorothiazide 25 mg p o  daily

## 2023-01-29 NOTE — ASSESSMENT & PLAN NOTE
· Placed in observation medicine  · Unclear etiology  · Patient is on no respiratory medications at home, has no respiratory diagnosis is not currently following with pulmonary  · O2 saturation as low as 88% on room air and is now requiring 2 L nasal cannula to maintain O2 saturation at 94%  · Patient does have a remote smoking history 1 to 2 packs a day approximately 4 to 5 years while in his teenage years  · Imaging only revealed bronchitis  · Will place on O2 and respiratory protocol  · Give Solu-Medrol 40 mg IV every 8 hours  · Give Xopenex and Atrovent nebs every 8 hours  · Obtain sputum culture and gram stain  · Trend lactic acid and procalcitonin  · Give Vibramycin 100 mg p o  every 12 hours  · Consult pulmonary

## 2023-01-29 NOTE — RESPIRATORY THERAPY NOTE
RT Protocol Note  Jamila Ham 74 y o  male MRN: [id number]  Unit/Bed#: ED 02 Encounter: [id number]    Assessment    Principal Problem:    Acute respiratory insufficiency  Active Problems:    Hyperlipidemia    Hypertension    Obesity, morbid ([address] )    Seasonal allergies      Home Pulmonary Medications:  none       Past Medical History:   Diagnosis Date    Arthritis     Hyperlipidemia     Hypertension     Obesity      Social History     Socioeconomic History    Marital status: /Civil Union     Spouse name: None    Number of children: None    Years of education: None    Highest education level: None   Occupational History    Occupation: RETIRED   Tobacco Use    Smoking status: Former     Years: 10 00     Types: Cigarettes    Smokeless tobacco: Never   Vaping Use    Vaping Use: Never used   Substance and Sexual Activity    Alcohol use: Yes     Comment: SOCIAL    Drug use: No    Sexual activity: None   Other Topics Concern    None   Social History Narrative    RETIRED         Social Determinants of Health     Financial Resource Strain: Not on file   Food Insecurity: Not on file   Transportation Needs: Not on file   Physical Activity: Not on file   Stress: Not on file   Social Connections: Not on file   Intimate Partner Violence: Not on file   Housing Stability: Not on file       Subjective         Objective    Physical Exam:   Assessment Type: (P) Pre-treatment  General Appearance: (P) Alert, Awake  Bilateral Breath Sounds: (P) Diminished, Expiratory wheezes  Cough: (P) Non-productive, Dry  O2 Device: (P) nc    Vitals:  Blood pressure 116/64, pulse 96, temperature 97 9 °F (36 6 °C), temperature source Oral, resp  rate 20, height 5' 11" (1 803 m), weight 118 kg (260 lb), SpO2 94 %  Imaging and other studies: I have personally reviewed pertinent reports        O2 Device: (P) nc     Plan    Respiratory Plan: (P) Mild Distress pathway  Airway Clearance Plan: (P) Discontinue Protocol     Resp Comments: (P) Pt  with no pulm hx or meds at home  Pt c/o dry NPC  Pt has some ex  wheezing and sl  coarse BS  Will continue udn TID at this time

## 2023-01-29 NOTE — ED PROVIDER NOTES
History  Chief Complaint   Patient presents with   • Shortness of Breath     Seen yesterday here, dx with pneumonia  At home went to lay down on his side & started breathing heavy  HPI    Prior to Admission Medications   Prescriptions Last Dose Informant Patient Reported? Taking?    ALPRAZolam (XANAX) 0 25 mg tablet   No No   Sig: TAKE 1 TABLET BY MOUTH ONCE DAILY AT BEDTIME AS NEEDED FOR ANXIETY OR SLEEP   aspirin 81 MG tablet   Yes No   Sig: Take 81 mg by mouth daily   cefpodoxime (VANTIN) 200 mg tablet   No No   Sig: Take 1 tablet (200 mg total) by mouth 2 (two) times a day for 7 days   doxycycline hyclate (VIBRAMYCIN) 100 mg capsule   No No   Sig: Take 1 capsule (100 mg total) by mouth 2 (two) times a day for 5 days   fluticasone (FLONASE) 50 mcg/act nasal spray   Yes No   Si spray into each nostril daily   loratadine (CLARITIN) 10 mg tablet   Yes No   Sig: Take 10 mg by mouth daily   losartan-hydrochlorothiazide (HYZAAR) 100-25 MG per tablet   No No   Sig: Take 1 tablet by mouth daily   metoprolol succinate (Toprol XL) 25 mg 24 hr tablet   No No   Sig: Take 1 tablet (25 mg total) by mouth daily   montelukast (SINGULAIR) 10 mg tablet   No No   Sig: Take 1 tablet (10 mg total) by mouth daily at bedtime   pravastatin (PRAVACHOL) 40 mg tablet   No No   Sig: Take 1 tablet (40 mg total) by mouth daily   sodium chloride (OCEAN) 0 65 % nasal spray   Yes No   Si spray into each nostril as needed for congestion   zolpidem (AMBIEN) 10 mg tablet   No No   Sig: Take 1 tablet (10 mg total) by mouth daily at bedtime as needed for sleep      Facility-Administered Medications: None       Past Medical History:   Diagnosis Date   • Arthritis    • Hyperlipidemia    • Hypertension    • Obesity        Past Surgical History:   Procedure Laterality Date   • CARDIAC SURGERY      heart catherization   • COLONOSCOPY     • ELBOW SURGERY     • HAND SURGERY Bilateral    • KNEE SURGERY     • WRIST SURGERY         Family History Problem Relation Age of Onset   • Rheum arthritis Mother    • Lupus Mother    • Heart disease Father    • Diabetes Sister    • Kidney disease Sister    • Heart disease Paternal Grandmother      I have reviewed and agree with the history as documented      E-Cigarette/Vaping   • E-Cigarette Use Never User      E-Cigarette/Vaping Substances   • Nicotine No    • THC No    • CBD No    • Flavoring No    • Other No    • Unknown No      Social History     Tobacco Use   • Smoking status: Former     Years: 10 00     Types: Cigarettes   • Smokeless tobacco: Never   Vaping Use   • Vaping Use: Never used   Substance Use Topics   • Alcohol use: Yes     Comment: SOCIAL   • Drug use: No       Review of Systems    Physical Exam  Physical Exam    Vital Signs  ED Triage Vitals [01/29/23 0203]   Temperature Pulse Respirations Blood Pressure SpO2   97 9 °F (36 6 °C) (!) 128 (!) 31 169/81 90 %      Temp Source Heart Rate Source Patient Position - Orthostatic VS BP Location FiO2 (%)   Oral Monitor Sitting Left arm --      Pain Score       No Pain           Vitals:    01/29/23 0203 01/29/23 0415   BP: 169/81 139/72   Pulse: (!) 128 92   Patient Position - Orthostatic VS: Sitting Sitting         Visual Acuity      ED Medications  Medications   sodium chloride 0 9 % bolus 1,000 mL (1,000 mL Intravenous New Bag 1/29/23 0221)   ipratropium-albuterol (DUO-NEB) 0 5-2 5 mg/3 mL inhalation solution 3 mL (3 mL Nebulization Given 1/29/23 0221)   iohexol (OMNIPAQUE) 350 MG/ML injection (SINGLE-DOSE) 100 mL (100 mL Intravenous Given 1/29/23 0350)   ipratropium-albuterol (DUO-NEB) 0 5-2 5 mg/3 mL inhalation solution 3 mL (3 mL Nebulization Given 1/29/23 0416)   methylPREDNISolone sodium succinate (Solu-MEDROL) injection 80 mg (80 mg Intravenous Given 1/29/23 0415)       Diagnostic Studies  Results Reviewed     Procedure Component Value Units Date/Time    Procalcitonin [[id number]]  (Normal) Collected: 01/29/23 0215    Lab Status: Final result Specimen: Blood from Arm, Right Updated: 01/29/23 0255     Procalcitonin 0 07 ng/ml     Lactic acid [[id number]]  (Normal) Collected: 01/29/23 0215    Lab Status: Final result Specimen: Blood from Arm, Right Updated: 01/29/23 0245     LACTIC ACID 1 2 mmol/L     Narrative:      Result may be elevated if tourniquet was used during collection      Comprehensive metabolic panel [[id number]]  (Abnormal) Collected: 01/29/23 0215    Lab Status: Final result Specimen: Blood from Arm, Right Updated: 01/29/23 0244     Sodium 138 mmol/L      Potassium 3 6 mmol/L      Chloride 104 mmol/L      CO2 27 mmol/L      ANION GAP 7 mmol/L      BUN 16 mg/dL      Creatinine 1 02 mg/dL      Glucose 118 mg/dL      Calcium 8 2 mg/dL      AST 21 U/L      ALT 26 U/L      Alkaline Phosphatase 43 U/L      Total Protein 6 8 g/dL      Albumin 4 0 g/dL      Total Bilirubin 0 57 mg/dL      eGFR 72 ml/min/1 73sq m     Narrative:      [facility] guidelines for Chronic Kidney Disease (CKD):   •  Stage 1 with normal or high GFR (GFR > 90 mL/min/1 73 square meters)  •  Stage 2 Mild CKD (GFR = 60-89 mL/min/1 73 square meters)  •  Stage 3A Moderate CKD (GFR = 45-59 mL/min/1 73 square meters)  •  Stage 3B Moderate CKD (GFR = 30-44 mL/min/1 73 square meters)  •  Stage 4 Severe CKD (GFR = 15-29 mL/min/1 73 square meters)  •  Stage 5 End Stage CKD (GFR <15 mL/min/1 73 square meters)  Note: GFR calculation is accurate only with a steady state creatinine    Protime-INR [[id number]]  (Normal) Collected: 01/29/23 0215    Lab Status: Final result Specimen: Blood from Arm, Right Updated: 01/29/23 0240     Protime 13 5 seconds      INR 1 03    APTT [[id number]]  (Normal) Collected: 01/29/23 0215    Lab Status: Final result Specimen: Blood from Arm, Right Updated: 01/29/23 0240     PTT 30 seconds     CBC and differential [[id number]] Collected: 01/29/23 0215    Lab Status: Final result Specimen: Blood from Arm, Right Updated: 01/29/23 0225     WBC 8 53 Thousand/uL      RBC 4 76 Million/uL      Hemoglobin 15 0 g/dL      Hematocrit 45 8 %      MCV 96 fL      MCH 31 5 pg      MCHC 32 8 g/dL      RDW 13 5 %      MPV 11 2 fL      Platelets 315 Thousands/uL      nRBC 0 /100 WBCs      Neutrophils Relative 64 %      Immat GRANS % 0 %      Lymphocytes Relative 22 %      Monocytes Relative 8 %      Eosinophils Relative 6 %      Basophils Relative 0 %      Neutrophils Absolute 5 38 Thousands/µL      Immature Grans Absolute 0 03 Thousand/uL      Lymphocytes Absolute 1 84 Thousands/µL      Monocytes Absolute 0 70 Thousand/µL      Eosinophils Absolute 0 55 Thousand/µL      Basophils Absolute 0 03 Thousands/µL     Blood culture #1 [[id number]] Collected: 01/29/23 0221    Lab Status: In process Specimen: Blood from Arm, Left Updated: 01/29/23 0224    Blood culture #2 [[id number]] Collected: 01/29/23 0215    Lab Status: In process Specimen: Blood from Arm, Right Updated: 01/29/23 0221    UA w Reflex to Microscopic w Reflex to Culture [[id number]]     Lab Status: No result Specimen: Urine                  CTA ED chest PE Study   Final Result by Maggie Shin DO (01/29 0401)      No acute pulmonary embolus  Bilateral lower lobe bronchial wall thickening, suggestive of bronchitis  Workstation performed: [id number]                    Procedures  Procedures         ED Course                               SBIRT 20yo+    Flowsheet Row Most Recent Value   SBIRT (23 yo +)    In order to provide better care to our patients, we are screening all of our patients for alcohol and drug use  Would it be okay to ask you these screening questions?  No Filed at: 01/29/2023 8903                    MDM    Disposition  Final diagnoses:   None     ED Disposition     ED Disposition   Admit    Condition   Stable    Date/Time   Sun Jan 29, 2023 0421    Comment   Case was discussed with SPENCER and the patient's admission status was agreed to be Admission Status: observation status to the service of Dr Bertin Jaimes   Follow-up Information    None         Patient's Medications   Discharge Prescriptions    No medications on file       No discharge procedures on file      PDMP Review       Value Time User    PDMP Reviewed  Yes 4/15/2021 10:49 AM Surya Oh          ED Provider  Electronically Signed by used during collection      Comprehensive metabolic panel [[id number]]  (Abnormal) Collected: 01/29/23 0215    Lab Status: Final result Specimen: Blood from Arm, Right Updated: 01/29/23 0244     Sodium 138 mmol/L      Potassium 3 6 mmol/L      Chloride 104 mmol/L      CO2 27 mmol/L      ANION GAP 7 mmol/L      BUN 16 mg/dL      Creatinine 1 02 mg/dL      Glucose 118 mg/dL      Calcium 8 2 mg/dL      AST 21 U/L      ALT 26 U/L      Alkaline Phosphatase 43 U/L      Total Protein 6 8 g/dL      Albumin 4 0 g/dL      Total Bilirubin 0 57 mg/dL      eGFR 72 ml/min/1 73sq m     Narrative:      [employer] guidelines for Chronic Kidney Disease (CKD):   •  Stage 1 with normal or high GFR (GFR > 90 mL/min/1 73 square meters)  •  Stage 2 Mild CKD (GFR = 60-89 mL/min/1 73 square meters)  •  Stage 3A Moderate CKD (GFR = 45-59 mL/min/1 73 square meters)  •  Stage 3B Moderate CKD (GFR = 30-44 mL/min/1 73 square meters)  •  Stage 4 Severe CKD (GFR = 15-29 mL/min/1 73 square meters)  •  Stage 5 End Stage CKD (GFR <15 mL/min/1 73 square meters)  Note: GFR calculation is accurate only with a steady state creatinine    Protime-INR [[id number]]  (Normal) Collected: 01/29/23 0215    Lab Status: Final result Specimen: Blood from Arm, Right Updated: 01/29/23 0240     Protime 13 5 seconds      INR 1 03    APTT [[id number]]  (Normal) Collected: 01/29/23 0215    Lab Status: Final result Specimen: Blood from Arm, Right Updated: 01/29/23 0240     PTT 30 seconds     CBC and differential [[id number]] Collected: 01/29/23 0215    Lab Status: Final result Specimen: Blood from Arm, Right Updated: 01/29/23 0225     WBC 8 53 Thousand/uL      RBC 4 76 Million/uL      Hemoglobin 15 0 g/dL      Hematocrit 45 8 %      MCV 96 fL      MCH 31 5 pg      MCHC 32 8 g/dL      RDW 13 5 %      MPV 11 2 fL      Platelets 486 Thousands/uL      nRBC 0 /100 WBCs      Neutrophils Relative 64 %      Immat GRANS % 0 %      Lymphocytes Relative 22 % Monocytes Relative 8 %      Eosinophils Relative 6 %      Basophils Relative 0 %      Neutrophils Absolute 5 38 Thousands/µL      Immature Grans Absolute 0 03 Thousand/uL      Lymphocytes Absolute 1 84 Thousands/µL      Monocytes Absolute 0 70 Thousand/µL      Eosinophils Absolute 0 55 Thousand/µL      Basophils Absolute 0 03 Thousands/µL                  CTA ED chest PE Study   Final Result by Maggie Shin DO (01/29 0401)      No acute pulmonary embolus  Bilateral lower lobe bronchial wall thickening, suggestive of bronchitis                    Workstation performed: [id number]                    Procedures  ECG 12 Lead Documentation Only    Date/Time: 2/7/2023 2:36 PM  Performed by: Juan Ramon Camacho MD  Authorized by: Juan Ramon Camacho MD     ECG reviewed by me, the ED Provider: yes    Patient location:  ED  Previous ECG:     Previous ECG:  Compared to current    Comparison to cardiac monitor: Yes    Interpretation:     Interpretation: non-specific    Rate:     ECG rate assessment: tachycardic    Rhythm:     Rhythm: sinus tachycardia    Ectopy:     Ectopy: none    QRS:     QRS axis:  Normal    QRS intervals:  Normal  Conduction:     Conduction: abnormal      Abnormal conduction: complete RBBB    ST segments:     ST segments:  Normal  T waves:     T waves: normal    CriticalCare Time  Performed by: Juan Ramon Camacho MD  Authorized by: Juan Ramon Camacho MD     Critical care provider statement:     Critical care time (minutes):  40    Critical care time was exclusive of:  Separately billable procedures and treating other patients and teaching time    Critical care was necessary to treat or prevent imminent or life-threatening deterioration of the following conditions:  Respiratory failure    Critical care was time spent personally by me on the following activities:  Blood draw for specimens, obtaining history from patient or surrogate, development of treatment plan with patient or surrogate, evaluation of patient's response to treatment, examination of patient, review of old charts, re-evaluation of patient's condition, ordering and review of radiographic studies and ordering and review of laboratory studies  Comments:      Bronchitis requiring supplemental oxygen, acute hypoxic respiratory failure             ED Course                               SBIRT 20yo+    Flowsheet Row Most Recent Value   SBIRT (25 yo +)    In order to provide better care to our patients, we are screening all of our patients for alcohol and drug use  Would it be okay to ask you these screening questions? No Filed at: 01/29/2023 0258                    Medical Decision Making  Patient is a 77-year-old male who presents for evaluation of worsening dyspnea to be diagnosed with pneumonia  Differential diagnosis include pneumonia versus bronchitis versus PE versus pneumothorax  CT PE study however was positive for bronchitis  Patient clinically with expiratory wheezing consistent with obstructive airway disease, was started on steroids and supplemental oxygen secondary to saturations in the high 80s  Blood work negative including procalcitonin, CMP, lactic acid  Bronchitis: acute illness or injury  Respiratory failure [facility]): acute illness or injury that poses a threat to life or bodily functions  Wheezing: acute illness or injury  Amount and/or Complexity of Data Reviewed  Labs: ordered  Radiology: ordered and independent interpretation performed  ECG/medicine tests: ordered and independent interpretation performed  Risk  Prescription drug management  Decision regarding hospitalization        Critical Care  Total time providing critical care: 30-74 minutes      Disposition  Final diagnoses:   Respiratory failure ([address] )   Bronchitis   Wheezing     Time reflects when diagnosis was documented in both MDM as applicable and the Disposition within this note     Time User Action Codes Description Comment    1/29/2023  4:30 AM René Jones R Add [R09 02] Hypoxia     1/29/2023  4:30 AM Reeec Alexander Medina Add [J96 90] Respiratory failure ([address] )     1/29/2023  4:30 AM Raydelbarb Dennis Modify [R09 02] Hypoxia     1/29/2023  4:30 AM Reece Barney Modify [J96 90] Respiratory failure ([address] )     1/29/2023  4:30 AM Raydell Denins Add [J40] Bronchitis     1/29/2023  4:30 AM Raydell Dennis Add [R06 2] Wheezing       ED Disposition     ED Disposition   Admit    Condition   Stable    Date/Time   Sun Jan 29, 2023  4:21 AM    Comment   Case was discussed with SPENCER and the patient's admission status was agreed to be Admission Status: observation status to the service of Dr Paige Castellano   Follow-up Information     Follow up With Specialties Details Why Contact Rachel Love PA-C Family Medicine, Internal Medicine, Physician Assistant Follow up in 1 week(s)  [address] [facility]  [phone]            Discharge Medication List as of 1/30/2023 12:15 PM      START taking these medications    Details   albuterol (Ventolin HFA) 90 mcg/act inhaler Inhale 2 puffs every 6 (six) hours as needed for wheezing, Starting Mon 1/30/2023, Normal      predniSONE 10 mg tablet Multiple Dosages:Starting Tue 1/31/2023, Until Thu 2/2/2023 at 2359, THEN Starting Fri 2/3/2023, Until Sun 2/5/2023 at 2359, THEN Starting Mon 2/6/2023, Until Wed 2/8/2023 at 2359, THEN Starting Thu 2/9/2023, Until Sat 2/11/2023 at 2359Take 4 tablet s (40 mg total) by mouth daily for 3 days, THEN 3 tablets (30 mg total) daily for 3 days, THEN 2 tablets (20 mg total) daily for 3 days, THEN 1 tablet (10 mg total) daily for 3 days   Do not start before January 31, 2023 , Normal         CONTINUE these medications which have CHANGED    Details   doxycycline hyclate (VIBRAMYCIN) 100 mg capsule Take 1 capsule (100 mg total) by mouth every 12 (twelve) hours for 7 doses, Starting Mon 1/30/2023, Until Fri 2/3/2023, Normal         CONTINUE these medications which have NOT CHANGED Details   ALPRAZolam (XANAX) 0 25 mg tablet TAKE 1 TABLET BY MOUTH ONCE DAILY AT BEDTIME AS NEEDED FOR ANXIETY OR SLEEP, Normal      aspirin 81 MG tablet Take 81 mg by mouth daily, Historical Med      fluticasone (FLONASE) 50 mcg/act nasal spray 1 spray into each nostril daily, Historical Med      loratadine (CLARITIN) 10 mg tablet Take 10 mg by mouth daily, Historical Med      losartan-hydrochlorothiazide (HYZAAR) 100-25 MG per tablet Take 1 tablet by mouth daily, Starting Mon 10/3/2022, Normal      metoprolol succinate (Toprol XL) 25 mg 24 hr tablet Take 1 tablet (25 mg total) by mouth daily, Starting Mon 12/12/2022, Normal      montelukast (SINGULAIR) 10 mg tablet Take 1 tablet (10 mg total) by mouth daily at bedtime, Starting Thu 5/26/2022, Normal      pravastatin (PRAVACHOL) 40 mg tablet Take 1 tablet (40 mg total) by mouth daily, Starting Tue 9/6/2022, Normal      sodium chloride (OCEAN) 0 65 % nasal spray 1 spray into each nostril as needed for congestion, Historical Med      zolpidem (AMBIEN) 10 mg tablet Take 1 tablet (10 mg total) by mouth daily at bedtime as needed for sleep, Starting Tue 1/10/2023, Normal         STOP taking these medications       cefpodoxime (VANTIN) 200 mg tablet Comments:   Reason for Stopping:               Outpatient Discharge Orders   Ambulatory referral to Pulmonology   Standing Status: Future Standing Exp   Date: 01/30/24      Activity as tolerated     Call provider for:  persistent nausea or vomiting     Call provider for:  severe uncontrolled pain     Call provider for:  difficulty breathing, headache or visual disturbances     Call provider for:  persistent dizziness or light-headedness       PDMP Review       Value Time User    PDMP Reviewed  Yes 4/15/2021 10:49 AM Surya Edward          ED Provider  Electronically Signed by           Joey Lucas MD  02/07/23 9759

## 2023-01-30 VITALS
OXYGEN SATURATION: 98 % | RESPIRATION RATE: 20 BRPM | HEIGHT: 71 IN | WEIGHT: 260 LBS | DIASTOLIC BLOOD PRESSURE: 74 MMHG | BODY MASS INDEX: 36.4 KG/M2 | TEMPERATURE: 96.7 F | HEART RATE: 103 BPM | SYSTOLIC BLOOD PRESSURE: 102 MMHG

## 2023-01-30 LAB
ANION GAP SERPL CALCULATED.3IONS-SCNC: 8 MMOL/L (ref 4–13)
ATRIAL RATE: 126 BPM
BASOPHILS # BLD AUTO: 0.01 THOUSANDS/ÂΜL (ref 0–0.1)
BASOPHILS NFR BLD AUTO: 0 % (ref 0–1)
BUN SERPL-MCNC: 20 MG/DL (ref 5–25)
CALCIUM SERPL-MCNC: 9 MG/DL (ref 8.4–10.2)
CHLORIDE SERPL-SCNC: 102 MMOL/L (ref 96–108)
CO2 SERPL-SCNC: 27 MMOL/L (ref 21–32)
CREAT SERPL-MCNC: 0.94 MG/DL (ref 0.6–1.3)
EOSINOPHIL # BLD AUTO: 0 THOUSAND/ÂΜL (ref 0–0.61)
EOSINOPHIL NFR BLD AUTO: 0 % (ref 0–6)
ERYTHROCYTE [DISTWIDTH] IN BLOOD BY AUTOMATED COUNT: 14.1 % (ref 11.6–15.1)
GFR SERPL CREATININE-BSD FRML MDRD: 79 ML/MIN/1.73SQ M
GLUCOSE SERPL-MCNC: 123 MG/DL (ref 65–140)
HCT VFR BLD AUTO: 44.8 % (ref 36.5–49.3)
HGB BLD-MCNC: 14.6 G/DL (ref 12–17)
IMM GRANULOCYTES # BLD AUTO: 0.08 THOUSAND/UL (ref 0–0.2)
IMM GRANULOCYTES NFR BLD AUTO: 1 % (ref 0–2)
LYMPHOCYTES # BLD AUTO: 1.05 THOUSANDS/ÂΜL (ref 0.6–4.47)
LYMPHOCYTES NFR BLD AUTO: 7 % (ref 14–44)
MCH RBC QN AUTO: 31.2 PG (ref 26.8–34.3)
MCHC RBC AUTO-ENTMCNC: 32.6 G/DL (ref 31.4–37.4)
MCV RBC AUTO: 96 FL (ref 82–98)
MONOCYTES # BLD AUTO: 0.83 THOUSAND/ÂΜL (ref 0.17–1.22)
MONOCYTES NFR BLD AUTO: 6 % (ref 4–12)
NEUTROPHILS # BLD AUTO: 12.54 THOUSANDS/ÂΜL (ref 1.85–7.62)
NEUTS SEG NFR BLD AUTO: 86 % (ref 43–75)
NRBC BLD AUTO-RTO: 0 /100 WBCS
P AXIS: 82 DEGREES
PLATELET # BLD AUTO: 217 THOUSANDS/UL (ref 149–390)
PMV BLD AUTO: 12.1 FL (ref 8.9–12.7)
POTASSIUM SERPL-SCNC: 3.9 MMOL/L (ref 3.5–5.3)
PR INTERVAL: 164 MS
PROCALCITONIN SERPL-MCNC: 0.05 NG/ML
QRS AXIS: -26 DEGREES
QRSD INTERVAL: 138 MS
QT INTERVAL: 396 MS
QTC INTERVAL: 573 MS
RBC # BLD AUTO: 4.68 MILLION/UL (ref 3.88–5.62)
SODIUM SERPL-SCNC: 137 MMOL/L (ref 135–147)
T WAVE AXIS: 39 DEGREES
VENTRICULAR RATE: 126 BPM
WBC # BLD AUTO: 14.51 THOUSAND/UL (ref 4.31–10.16)

## 2023-01-30 RX ORDER — PREDNISONE 10 MG/1
TABLET ORAL
Qty: 30 TABLET | Refills: 0 | Status: SHIPPED | OUTPATIENT
Start: 2023-01-31 | End: 2023-02-12

## 2023-01-30 RX ORDER — DOXYCYCLINE HYCLATE 100 MG/1
100 CAPSULE ORAL EVERY 12 HOURS
Qty: 7 CAPSULE | Refills: 0 | Status: SHIPPED | OUTPATIENT
Start: 2023-01-30 | End: 2023-02-03

## 2023-01-30 RX ORDER — ALBUTEROL SULFATE 90 UG/1
2 AEROSOL, METERED RESPIRATORY (INHALATION) EVERY 6 HOURS PRN
Qty: 8.5 G | Refills: 0 | Status: SHIPPED | OUTPATIENT
Start: 2023-01-30 | End: 2023-01-30

## 2023-01-30 RX ORDER — ALBUTEROL SULFATE 90 UG/1
2 AEROSOL, METERED RESPIRATORY (INHALATION) EVERY 6 HOURS PRN
Qty: 18 G | Refills: 0 | Status: SHIPPED | OUTPATIENT
Start: 2023-01-30

## 2023-01-30 RX ADMIN — ISODIUM CHLORIDE 3 ML: 0.03 SOLUTION RESPIRATORY (INHALATION) at 07:05

## 2023-01-30 RX ADMIN — HEPARIN SODIUM 5000 UNITS: 5000 INJECTION INTRAVENOUS; SUBCUTANEOUS at 05:00

## 2023-01-30 RX ADMIN — LOSARTAN POTASSIUM 100 MG: 50 TABLET, FILM COATED ORAL at 09:29

## 2023-01-30 RX ADMIN — DOXYCYCLINE 100 MG: 100 CAPSULE ORAL at 04:58

## 2023-01-30 RX ADMIN — ASPIRIN 81 MG: 81 TABLET, COATED ORAL at 09:29

## 2023-01-30 RX ADMIN — METOPROLOL SUCCINATE 25 MG: 50 TABLET, EXTENDED RELEASE ORAL at 09:29

## 2023-01-30 RX ADMIN — HYDROCHLOROTHIAZIDE 25 MG: 25 TABLET ORAL at 09:29

## 2023-01-30 RX ADMIN — FLUTICASONE PROPIONATE 1 SPRAY: 50 SPRAY, METERED NASAL at 10:40

## 2023-01-30 RX ADMIN — METHYLPREDNISOLONE SODIUM SUCCINATE 40 MG: 40 INJECTION, POWDER, FOR SOLUTION INTRAMUSCULAR; INTRAVENOUS at 04:58

## 2023-01-30 RX ADMIN — LORATADINE 10 MG: 10 TABLET ORAL at 09:29

## 2023-01-30 RX ADMIN — LEVALBUTEROL HYDROCHLORIDE 1.25 MG: 1.25 SOLUTION, CONCENTRATE RESPIRATORY (INHALATION) at 07:06

## 2023-01-30 NOTE — NURSING NOTE
Pt from the er to room 215, oriented to the unit and the callbell, pt sitting oob in his chair in no acute distress watching tv, callbell in reach of the pt

## 2023-01-30 NOTE — NURSING NOTE
0172 [facility] nurse completed AVS with patient and wife  They both verbalized understanding  They have a paper copy of AVS to take home

## 2023-01-30 NOTE — PLAN OF CARE
Problem: Potential for Falls  Goal: Patient will remain free of falls  Description: INTERVENTIONS:  - Educate patient/family on patient safety including physical limitations  - Instruct patient to call for assistance with activity   - Consult OT/PT to assist with strengthening/mobility   - Keep Call bell within reach  - Keep bed low and locked with side rails adjusted as appropriate  - Keep care items and personal belongings within reach  - Initiate and maintain comfort rounds  - Make Fall Risk Sign visible to staff  - Offer Toileting in advance of need  - Initiate/Maintain bed alarm  - Obtain necessary fall risk management equipment:   - Apply yellow socks and bracelet for high fall risk patients  - Consider moving patient to room near nurses station  1/30/2023 1051 by Valentina Braxton RN  Outcome: Adequate for Discharge  1/30/2023 1012 by Valentina Braxton RN  Outcome: Progressing     Problem: PAIN - ADULT  Goal: Verbalizes/displays adequate comfort level or baseline comfort level  Description: Interventions:  - Encourage patient to monitor pain and request assistance  - Assess pain using appropriate pain scale  - Administer analgesics based on type and severity of pain and evaluate response  - Implement non-pharmacological measures as appropriate and evaluate response  - Consider cultural and social influences on pain and pain management  - Notify physician/advanced practitioner if interventions unsuccessful or patient reports new pain  1/30/2023 1051 by Valentina Braxton RN  Outcome: Adequate for Discharge  1/30/2023 1012 by Valentina Braxton RN  Outcome: Progressing     Problem: INFECTION - ADULT  Goal: Absence or prevention of progression during hospitalization  Description: INTERVENTIONS:  - Assess and monitor for signs and symptoms of infection  - Monitor lab/diagnostic results  - Monitor all insertion sites, i e  indwelling lines, tubes, and drains  - Monitor endotracheal if appropriate and nasal secretions for changes in amount and color  - [place] appropriate cooling/warming therapies per order  - Administer medications as ordered  - Instruct and encourage patient and family to use good hand hygiene technique  - Identify and instruct in appropriate isolation precautions for identified infection/condition  1/30/2023 1051 by Param Titus RN  Outcome: Adequate for Discharge  1/30/2023 1012 by Param Titus RN  Outcome: Progressing     Problem: SAFETY ADULT  Goal: Patient will remain free of falls  Description: INTERVENTIONS:  - Educate patient/family on patient safety including physical limitations  - Instruct patient to call for assistance with activity   - Consult OT/PT to assist with strengthening/mobility   - Keep Call bell within reach  - Keep bed low and locked with side rails adjusted as appropriate  - Keep care items and personal belongings within reach  - Initiate and maintain comfort rounds  - Make Fall Risk Sign visible to staff  - Offer Toileting in advance of need  - Initiate/Maintain bed alarm  - Obtain necessary fall risk management equipment:   - Apply yellow socks and bracelet for high fall risk patients  - Consider moving patient to room near nurses station  1/30/2023 1051 by Param Titus RN  Outcome: Adequate for Discharge  1/30/2023 1012 by Param Titus RN  Outcome: Progressing  Goal: Maintain or return to baseline ADL function  Description: INTERVENTIONS:  - Educate patient/family on patient safety including physical limitations  - Instruct patient to call for assistance with activity   - Consult OT/PT to assist with strengthening/mobility   - Keep Call bell within reach  - Keep bed low and locked with side rails adjusted as appropriate  - Keep care items and personal belongings within reach  - Initiate and maintain comfort rounds  - Make Fall Risk Sign visible to staff  - Offer Toileting in advance of need  - Initiate/Maintain bed alarm  - Obtain necessary fall risk management equipment:   - Apply yellow socks and bracelet for high fall risk patients  - Consider moving patient to room near nurses station  1/30/2023 1051 by Laurence Srivastava RN  Outcome: Adequate for Discharge  1/30/2023 1012 by Laurence Srivastava RN  Outcome: Progressing     Problem: DISCHARGE PLANNING  Goal: Discharge to home or other facility with appropriate resources  Description: INTERVENTIONS:  - Identify barriers to discharge w/patient and caregiver  - Arrange for needed discharge resources and transportation as appropriate  - Identify discharge learning needs (meds, wound care, etc )  - Refer to Case Management Department for coordinating discharge planning if the patient needs post-hospital services based on physician/advanced practitioner order or complex needs related to functional status, cognitive ability, or social support system  1/30/2023 1051 by Laurence Srivastava RN  Outcome: Adequate for Discharge  1/30/2023 1012 by Laurence Srivastava RN  Outcome: Progressing     Problem: Knowledge Deficit  Goal: Patient/family/caregiver demonstrates understanding of disease process, treatment plan, medications, and discharge instructions  Description: Complete learning assessment and assess knowledge base    Interventions:  - Provide teaching at level of understanding  - Provide teaching via preferred learning methods  1/30/2023 1051 by Laurence Srivastava RN  Outcome: Adequate for Discharge  1/30/2023 1012 by Laurence Srivastava RN  Outcome: Progressing     Problem: RESPIRATORY - ADULT  Goal: Achieves optimal ventilation and oxygenation  Description: INTERVENTIONS:  - Assess for changes in respiratory status  - Assess for changes in mentation and behavior  - Position to facilitate oxygenation and minimize respiratory effort  - Oxygen administered by appropriate delivery if ordered  - Initiate smoking cessation education as indicated  - Encourage broncho-pulmonary hygiene including cough, deep breathe, Incentive Spirometry  - Assess the need for suctioning and aspirate as needed  - Assess and instruct to report SOB or any respiratory difficulty  - Respiratory Therapy support as indicated  1/30/2023 1051 by Rahul Goncalves RN  Outcome: Adequate for Discharge  1/30/2023 1012 by Rahul Goncalves RN  Outcome: Progressing

## 2023-01-30 NOTE — PLAN OF CARE
Problem: Potential for Falls  Goal: Patient will remain free of falls  Description: INTERVENTIONS:  - Educate patient/family on patient safety including physical limitations  - Instruct patient to call for assistance with activity   - Consult OT/PT to assist with strengthening/mobility   - Keep Call bell within reach  - Keep bed low and locked with side rails adjusted as appropriate  - Keep care items and personal belongings within reach  - Initiate and maintain comfort rounds  - Make Fall Risk Sign visible to staff  - Offer Toileting in advance of need  - Initiate/Maintain bed alarm  - Obtain necessary fall risk management equipment:   - Apply yellow socks and bracelet for high fall risk patients  - Consider moving patient to room near nurses station  Outcome: Progressing     Problem: PAIN - ADULT  Goal: Verbalizes/displays adequate comfort level or baseline comfort level  Description: Interventions:  - Encourage patient to monitor pain and request assistance  - Assess pain using appropriate pain scale  - Administer analgesics based on type and severity of pain and evaluate response  - Implement non-pharmacological measures as appropriate and evaluate response  - Consider cultural and social influences on pain and pain management  - Notify physician/advanced practitioner if interventions unsuccessful or patient reports new pain  Outcome: Progressing     Problem: INFECTION - ADULT  Goal: Absence or prevention of progression during hospitalization  Description: INTERVENTIONS:  - Assess and monitor for signs and symptoms of infection  - Monitor lab/diagnostic results  - Monitor all insertion sites, i e  indwelling lines, tubes, and drains  - Monitor endotracheal if appropriate and nasal secretions for changes in amount and color  - [place] appropriate cooling/warming therapies per order  - Administer medications as ordered  - Instruct and encourage patient and family to use good hand hygiene technique  - Identify and instruct in appropriate isolation precautions for identified infection/condition  Outcome: Progressing     Problem: SAFETY ADULT  Goal: Patient will remain free of falls  Description: INTERVENTIONS:  - Educate patient/family on patient safety including physical limitations  - Instruct patient to call for assistance with activity   - Consult OT/PT to assist with strengthening/mobility   - Keep Call bell within reach  - Keep bed low and locked with side rails adjusted as appropriate  - Keep care items and personal belongings within reach  - Initiate and maintain comfort rounds  - Make Fall Risk Sign visible to staff  - Offer Toileting in advance of need  - Initiate/Maintain bed alarm  - Obtain necessary fall risk management equipment:   - Apply yellow socks and bracelet for high fall risk patients  - Consider moving patient to room near nurses station  Outcome: Progressing  Goal: Maintain or return to baseline ADL function  Description: INTERVENTIONS:  - Educate patient/family on patient safety including physical limitations  - Instruct patient to call for assistance with activity   - Consult OT/PT to assist with strengthening/mobility   - Keep Call bell within reach  - Keep bed low and locked with side rails adjusted as appropriate  - Keep care items and personal belongings within reach  - Initiate and maintain comfort rounds  - Make Fall Risk Sign visible to staff  - Offer Toileting in advance of need  - Initiate/Maintain bed alarm  - Obtain necessary fall risk management equipment:   - Apply yellow socks and bracelet for high fall risk patients  - Consider moving patient to room near nurses station  Outcome: Progressing     Problem: DISCHARGE PLANNING  Goal: Discharge to home or other facility with appropriate resources  Description: INTERVENTIONS:  - Identify barriers to discharge w/patient and caregiver  - Arrange for needed discharge resources and transportation as appropriate  - Identify discharge learning needs (meds, wound care, etc )  - Refer to Case Management Department for coordinating discharge planning if the patient needs post-hospital services based on physician/advanced practitioner order or complex needs related to functional status, cognitive ability, or social support system  Outcome: Progressing     Problem: Knowledge Deficit  Goal: Patient/family/caregiver demonstrates understanding of disease process, treatment plan, medications, and discharge instructions  Description: Complete learning assessment and assess knowledge base    Interventions:  - Provide teaching at level of understanding  - Provide teaching via preferred learning methods  Outcome: Progressing     Problem: RESPIRATORY - ADULT  Goal: Achieves optimal ventilation and oxygenation  Description: INTERVENTIONS:  - Assess for changes in respiratory status  - Assess for changes in mentation and behavior  - Position to facilitate oxygenation and minimize respiratory effort  - Oxygen administered by appropriate delivery if ordered  - Initiate smoking cessation education as indicated  - Encourage broncho-pulmonary hygiene including cough, deep breathe, Incentive Spirometry  - Assess the need for suctioning and aspirate as needed  - Assess and instruct to report SOB or any respiratory difficulty  - Respiratory Therapy support as indicated  Outcome: Progressing

## 2023-01-30 NOTE — NURSING NOTE
Discharge instructions reviewed with patient and wife via the virtual nurse  Left in stable condition with all belongings

## 2023-01-30 NOTE — ASSESSMENT & PLAN NOTE
· Multifactorial secondary to bronchitis as well as possible component of obesity hypoventilation/sleep apnea  · Patient has been titrated off oxygen  · Pulmonology input appreciated  · Will discharge on prednisone taper  · Also discharged on albuterol inhaler  · Ambulatory referral to pulmonology (may benefit from outpatient sleep study)

## 2023-01-30 NOTE — DISCHARGE SUMMARY
[facility]  Discharge- Debby Bloom 1950, 74 y o  male MRN: [id number]  Unit/Bed#: -01 Encounter: [id number]  Primary Care Provider: Wendy Nickerson PA-C   Date and time admitted to hospital: 1/29/2023  1:58 AM    * Acute respiratory insufficiency  Assessment & Plan  · Multifactorial secondary to bronchitis as well as possible component of obesity hypoventilation/sleep apnea  · Patient has been titrated off oxygen  · Pulmonology input appreciated  · Will discharge on prednisone taper  · Also discharged on albuterol inhaler  · Ambulatory referral to pulmonology (may benefit from outpatient sleep study)    Seasonal allergies  Assessment & Plan  Continue prehospital Singulair 10 mg p o  nightly and Claritin 10 mg p o  daily    Obesity, morbid (HCC)  Assessment & Plan  Encourage healthy weight loss and supportive care    Hypertension  Assessment & Plan  Continue prehospital Toprol-XL 25 mg p o  daily, Cozaar 100 mg p o  daily and hydrochlorothiazide 25 mg p o  daily    Hyperlipidemia  Assessment & Plan  Continue prehospital Pravachol 40 mg p o  nightly      Discharging Physician / Practitioner: Jeremi Longoria MD  PCP: Wendy Nickerson PA-C  Admission Date:   Admission Orders (From admission, onward)     Ordered        01/29/23 0431  Place in Observation  Once                      Discharge Date: 01/30/23    Medical Problems     Resolved Problems  Date Reviewed: 1/29/2023   None         Consultations During Hospital Stay:  · Pulmonology    Procedures Performed:   · None    Significant Findings / Test Results:   · Bronchitis/acute respiratory failure    Incidental Findings:   · None    Test Results Pending at Discharge (will require follow up):    · None     Outpatient Tests Requested:  · Routine labs with PCP as outpatient    Complications:    • None    Reason for Admission: Bronchitis    Hospital Course:     Debby Bloom is a 74 y o  male patient who originally presented to the hospital on 1/29/2023 due to shortness of breath/cough  Patient was started on antibiotics and steroids for suspected pneumonia on admission  CTA chest was performed and negative for PE, no obvious pneumonia however patient was noted to have bronchitis  Pulmonology was consulted  Patient was continued to require 2 L of oxygen  Pulmonology recommended slow prednisone taper and albuterol on discharge  Patient was titrated off oxygen today  While I was examining patient I removed the oxygen  Patient maintain saturation greater than 90% all talking and also with exertion  She does not need home oxygen at this time  Patient discharged on prednisone, albuterol, doxycycline  Provided with ambulatory referral to pulmonology  Advised to return if he had any worsening symptoms    Please see above list of diagnoses and related plan for additional information  Condition at Discharge: stable     Discharge Day Visit / Exam:     Subjective: No complaints at this time  Heart rate was slightly on the higher side however patient just received an albuterol treatment  Denies any shortness of breath, chest pain, or palpitations  No headache or change in vision  No dizziness or lightheadedness    Vitals: Blood Pressure: 102/74 (01/30/23 0718)  Pulse: 103 (01/30/23 0718)  Temperature: (!) 96 7 °F (35 9 °C) (01/30/23 0718)  Temp Source: Oral (01/29/23 0203)  Respirations: 20 (01/30/23 0718)  Height: 5' 11" (180 3 cm) (01/29/23 0203)  Weight - Scale: 118 kg (260 lb) (01/29/23 0203)  SpO2: 98 % (01/30/23 0718)  Exam:   Physical Exam  Constitutional:       General: He is not in acute distress  Appearance: He is obese  HENT:      Head: Normocephalic and atraumatic  Nose: Nose normal       Mouth/Throat:      Mouth: Mucous membranes are moist    Eyes:      Extraocular Movements: Extraocular movements intact        Conjunctiva/sclera: Conjunctivae normal    Cardiovascular:      Rate and Rhythm: Regular rhythm  Tachycardia present  Pulmonary:      Effort: Pulmonary effort is normal  No respiratory distress  Abdominal:      Palpations: Abdomen is soft  Tenderness: There is no abdominal tenderness  Musculoskeletal:         General: Normal range of motion  Cervical back: Normal range of motion and neck supple  Skin:     General: Skin is warm and dry  Neurological:      General: No focal deficit present  Mental Status: He is alert  Mental status is at baseline  Cranial Nerves: No cranial nerve deficit  Psychiatric:         Mood and Affect: Mood normal          Behavior: Behavior normal          Discussion with Family: Attempted to call patient's wife Hilton Anderson at number provided in chart with no answer    Discharge instructions/Information to patient and family:   See after visit summary for information provided to patient and family  Provisions for Follow-Up Care:  See after visit summary for information related to follow-up care and any pertinent home health orders  Disposition:     Home      Planned Readmission:   • no     Discharge Statement:  I spent 35 minutes discharging the patient  This time was spent on the day of discharge  I had direct contact with the patient on the day of discharge  Greater than 50% of the total time was spent examining patient, answering all patient questions, arranging and discussing plan of care with patient as well as directly providing post-discharge instructions  Additional time then spent on discharge activities  Discharge Medications:  See after visit summary for reconciled discharge medications provided to patient and family        ** Please Note: This note has been constructed using a voice recognition system **

## 2023-01-31 ENCOUNTER — TRANSITIONAL CARE MANAGEMENT (OUTPATIENT)
Dept: INTERNAL MEDICINE CLINIC | Facility: CLINIC | Age: 73
End: 2023-01-31

## 2023-02-03 ENCOUNTER — OFFICE VISIT (OUTPATIENT)
Dept: INTERNAL MEDICINE CLINIC | Facility: CLINIC | Age: 73
End: 2023-02-03

## 2023-02-03 VITALS
TEMPERATURE: 97.6 F | DIASTOLIC BLOOD PRESSURE: 64 MMHG | HEART RATE: 101 BPM | HEIGHT: 71 IN | OXYGEN SATURATION: 93 % | WEIGHT: 262.25 LBS | BODY MASS INDEX: 36.71 KG/M2 | SYSTOLIC BLOOD PRESSURE: 128 MMHG

## 2023-02-03 DIAGNOSIS — J40 BRONCHITIS: Primary | ICD-10-CM

## 2023-02-03 LAB
BACTERIA BLD CULT: NORMAL
BACTERIA BLD CULT: NORMAL

## 2023-02-13 DIAGNOSIS — G47.09 OTHER INSOMNIA: ICD-10-CM

## 2023-02-13 RX ORDER — ZOLPIDEM TARTRATE 10 MG/1
10 TABLET ORAL
Qty: 30 TABLET | Refills: 0 | Status: SHIPPED | OUTPATIENT
Start: 2023-02-13

## 2023-02-19 PROBLEM — J40 BRONCHITIS: Status: ACTIVE | Noted: 2023-02-19

## 2023-02-19 NOTE — PROGRESS NOTES
Transition of Care  Follow-up After Hospitalization    Ele Marroquin 74 y o  male   Date:  2/19/2023    TCM Call     Date and time call was made  2/1/2023 12:28 PM    Hospital care reviewed  Records reviewed    Patient was hospitialized at  [address]    Date of Admission  01/29/23    Date of discharge  01/30/23    Diagnosis  Acute respiratory insufficiency    Disposition  Home    Were the patients medications reviewed and updated  Yes    Current Symptoms  None      TCM Call     Post hospital issues  None    Should patient be enrolled in anticoag monitoring? No    Scheduled for follow up? Yes    Did you obtain your prescribed medications  Yes    Do you need help managing your prescriptions or medications  No    Is transportation to your appointment needed  No    I have advised the patient to call PCP with any new or worsening symptoms  josh schwartz ma    Are you recieving any outpatient services  No    Are you recieving home care services  No    Are you using any community resources  No    Current waiver services  No    Have you fallen in the last 12 months  No    Interperter language line needed  No    Counseling  Patient    Counseling topics  Diagnostic results; Risk factor reduction; instructions for management; patient and family education; Prognosis; Activities of daily living; Importance of RX compliance        Admit Date: 1/29  Discharge Date: 1/30  Diagnosis: bronchitis  Location: [facility] to [facility] records were reviewed  Medications upon discharge reviewed/updated  Medication Changes: none  Imaging: CXR, CTA chest  Consults: pulmonary  Discharge Disposition:  home  Follow up visits with other specialists: pulmonary      Assessment and Plan:    1  Bronchitis: Confirmed on CTA chest  Pt completed prednisone, doxycycline and albuterol  Weaned off of supplemental O2  Feeling much improved  Continue upcoming michelle with pulmonary on 3/13       Ron Torres was seen today for transition of care management  Diagnoses and all orders for this visit:    Bronchitis          HPI:  Pt presents for TCM  He was evaluated at [address] [facility] for cough and SOB  HE was told he may have pneumonia and was discharged home with medication  His symptoms worsened so he returned on 1/29  He underwent CXR which revealed left infiltrate vs reactive change  CTA revealed this was bronchitis  HE was given 2L O2 and eventually weaned off  He was admitted for overnight observation  He was given albuterl, prednisone and doxycycline  He has follow up appt on 3/13 with pulmonary and is feeling improved overall  ROS: Review of Systems   Constitutional: Negative for chills and fever  HENT: Negative for congestion, ear pain, hearing loss, postnasal drip, rhinorrhea, sinus pressure, sinus pain, sore throat and trouble swallowing  Eyes: Negative for pain and visual disturbance  Respiratory: Positive for cough and shortness of breath  Negative for chest tightness and wheezing  Cardiovascular: Negative  Negative for chest pain, palpitations and leg swelling  Gastrointestinal: Negative for abdominal pain, blood in stool, constipation, diarrhea, nausea and vomiting  Endocrine: Negative for cold intolerance, heat intolerance, polydipsia, polyphagia and polyuria  Genitourinary: Negative for difficulty urinating, dysuria, flank pain and urgency  Musculoskeletal: Negative for arthralgias, back pain, gait problem and myalgias  Skin: Negative for rash  Allergic/Immunologic: Negative  Neurological: Negative for dizziness, weakness, light-headedness and headaches  Hematological: Negative  Psychiatric/Behavioral: Negative for behavioral problems, dysphoric mood and sleep disturbance  The patient is not nervous/anxious          Past Medical History:   Diagnosis Date   • Arthritis    • Hyperlipidemia    • Hypertension    • Obesity        Past Surgical History:   Procedure Laterality Date   • CARDIAC SURGERY      heart catherization   • CARPAL TUNNEL RELEASE Bilateral    • COLONOSCOPY     • ELBOW SURGERY Bilateral     implants in place   • FINGER SURGERY Left     pin in place - for arthritis   • GANGLION CYST EXCISION Left    • HAND SURGERY Bilateral    • KNEE SURGERY Left     with repair of meniscus       Social History     Socioeconomic History   • Marital status: /Civil Union     Spouse name: None   • Number of children: None   • Years of education: None   • Highest education level: None   Occupational History   • Occupation: RETIRED   Tobacco Use   • Smoking status: Former     Years: 10 00     Types: Cigarettes   • Smokeless tobacco: Never   Vaping Use   • Vaping Use: Never used   Substance and Sexual Activity   • Alcohol use: Yes     Comment: SOCIAL   • Drug use: No   • Sexual activity: None   Other Topics Concern   • None   Social History Narrative    RETIRED         Social Determinants of Health     Financial Resource Strain: Not on file   Food Insecurity: Not on file   Transportation Needs: Not on file   Physical Activity: Not on file   Stress: Not on file   Social Connections: Not on file   Intimate Partner Violence: Not on file   Housing Stability: Not on file       Family History   Problem Relation Age of Onset   • Rheum arthritis Mother    • Lupus Mother    • Heart disease Father    • Diabetes Sister    • Kidney disease Sister    • Heart disease Paternal Grandmother        Allergies   Allergen Reactions   • Other      Summertime grass, weeds         Current Outpatient Medications:   •  albuterol (Ventolin HFA) 90 mcg/act inhaler, Inhale 2 puffs every 6 (six) hours as needed for wheezing (Patient taking differently: Inhale 2 puffs every 6 (six) hours as needed for wheezing PRN), Disp: 18 g, Rfl: 0  •  ALPRAZolam (XANAX) 0 25 mg tablet, TAKE 1 TABLET BY MOUTH ONCE DAILY AT BEDTIME AS NEEDED FOR ANXIETY OR SLEEP, Disp: 30 tablet, Rfl: 0  •  aspirin 81 MG tablet, Take 81 mg by mouth daily, Disp: , Rfl:   • fluticasone (FLONASE) 50 mcg/act nasal spray, 1 spray into each nostril daily, Disp: , Rfl:   •  loratadine (CLARITIN) 10 mg tablet, Take 10 mg by mouth daily, Disp: , Rfl:   •  losartan-hydrochlorothiazide (HYZAAR) 100-25 MG per tablet, Take 1 tablet by mouth daily, Disp: 90 tablet, Rfl: 3  •  metoprolol succinate (Toprol XL) 25 mg 24 hr tablet, Take 1 tablet (25 mg total) by mouth daily, Disp: 30 tablet, Rfl: 5  •  montelukast (SINGULAIR) 10 mg tablet, Take 1 tablet (10 mg total) by mouth daily at bedtime, Disp: 30 tablet, Rfl: 5  •  pravastatin (PRAVACHOL) 40 mg tablet, Take 1 tablet (40 mg total) by mouth daily, Disp: 90 tablet, Rfl: 1  •  sodium chloride (OCEAN) 0 65 % nasal spray, 1 spray into each nostril as needed for congestion, Disp: , Rfl:   •  azelastine (ASTELIN) 0 1 % nasal spray, 1 spray into each nostril 2 (two) times a day, Disp: 30 mL, Rfl: 11  •  zolpidem (AMBIEN) 10 mg tablet, Take 1 tablet (10 mg total) by mouth daily at bedtime as needed for sleep, Disp: 30 tablet, Rfl: 0      Physical Exam:  /64 (BP Location: Left arm, Patient Position: Sitting)   Pulse 101   Temp 97 6 °F (36 4 °C)   Ht 5' 11" (1 803 m)   Wt 119 kg (262 lb 4 oz)   SpO2 93%   BMI 36 58 kg/m²     Physical Exam  Constitutional:       General: He is not in acute distress  Appearance: He is well-developed  He is not diaphoretic  HENT:      Head: Normocephalic and atraumatic  Right Ear: External ear normal       Left Ear: External ear normal       Nose: Nose normal       Mouth/Throat:      Pharynx: No oropharyngeal exudate  Eyes:      General: No scleral icterus  Right eye: No discharge  Left eye: No discharge  Conjunctiva/sclera: Conjunctivae normal       Pupils: Pupils are equal, round, and reactive to light  Neck:      Thyroid: No thyromegaly  Cardiovascular:      Rate and Rhythm: Normal rate and regular rhythm  Heart sounds: Normal heart sounds  No murmur heard      No friction rub  No gallop  Pulmonary:      Effort: Pulmonary effort is normal  No respiratory distress  Breath sounds: Normal breath sounds  No wheezing or rales  Abdominal:      General: Bowel sounds are normal  There is no distension  Palpations: Abdomen is soft  Tenderness: There is no abdominal tenderness  Musculoskeletal:         General: No tenderness or deformity  Normal range of motion  Cervical back: Normal range of motion and neck supple  Skin:     General: Skin is warm and dry  Neurological:      Mental Status: He is alert and oriented to person, place, and time  Cranial Nerves: No cranial nerve deficit  Psychiatric:         Behavior: Behavior normal          Thought Content:  Thought content normal          Judgment: Judgment normal              Labs:  Lab Results   Component Value Date    WBC 14 51 (H) 01/30/2023    HGB 14 6 01/30/2023    HCT 44 8 01/30/2023    MCV 96 01/30/2023     01/30/2023     Lab Results   Component Value Date    K 3 9 01/30/2023     01/30/2023    CO2 27 01/30/2023    BUN 20 01/30/2023    CREATININE 0 94 01/30/2023    GLUF 140 (H) 01/24/2023    CALCIUM 9 0 01/30/2023    AST 21 01/29/2023    ALT 26 01/29/2023    ALKPHOS 43 01/29/2023    EGFR 79 01/30/2023

## 2023-03-13 DIAGNOSIS — G47.09 OTHER INSOMNIA: ICD-10-CM

## 2023-03-14 RX ORDER — ZOLPIDEM TARTRATE 10 MG/1
10 TABLET ORAL
Qty: 30 TABLET | Refills: 0 | Status: SHIPPED | OUTPATIENT
Start: 2023-03-14

## 2023-03-21 DIAGNOSIS — E78.5 HYPERLIPIDEMIA, UNSPECIFIED HYPERLIPIDEMIA TYPE: ICD-10-CM

## 2023-03-21 RX ORDER — PRAVASTATIN SODIUM 40 MG
40 TABLET ORAL DAILY
Qty: 90 TABLET | Refills: 1 | Status: SHIPPED | OUTPATIENT
Start: 2023-03-21

## 2023-04-27 ENCOUNTER — OFFICE VISIT (OUTPATIENT)
Dept: WOUND CARE | Facility: CLINIC | Age: 73
End: 2023-04-27

## 2023-04-27 VITALS
TEMPERATURE: 96.7 F | HEART RATE: 96 BPM | DIASTOLIC BLOOD PRESSURE: 81 MMHG | SYSTOLIC BLOOD PRESSURE: 133 MMHG | RESPIRATION RATE: 18 BRPM

## 2023-04-27 DIAGNOSIS — L97.812 VENOUS STASIS ULCER OF OTHER PART OF RIGHT LOWER LEG WITH FAT LAYER EXPOSED WITHOUT VARICOSE VEINS (HCC): Primary | ICD-10-CM

## 2023-04-27 DIAGNOSIS — I87.2 VENOUS STASIS ULCER OF OTHER PART OF RIGHT LOWER LEG WITH FAT LAYER EXPOSED WITHOUT VARICOSE VEINS (HCC): Primary | ICD-10-CM

## 2023-04-27 NOTE — PROGRESS NOTES
Patient ID: Heidi Kemp is a 74 y o  male Date of Birth 1950     Diagnosis:  1  Venous stasis ulcer of other part of right lower leg with fat layer exposed without varicose veins (HCC)  -     Wound cleansing and dressings; Future       Diagnosis ICD-10-CM Associated Orders   1  Venous stasis ulcer of other part of right lower leg with fat layer exposed without varicose veins (HCC)  I87 2 Wound cleansing and dressings    L97 812            Assessment & Plan:  1  RLE cellulitis resolved   2  RLE venous stasis ulceration-healed   3  B/l lower leg venous insuffiencey  4  Diabetic neuropathy      -  Patient was counseled and educated on the condition and the diagnosis   The diagnosis, treatment options and prognosis were discussed in detail    -Recommended patient to apply moisturizer to bilateral lower extremity as well as daily legs and feet checks  Sami importance of wearing compression stockings during the day and leg elevation for edema control and to prevent future blisters ulcerations and infections   -Discussed importance of glycemic control and proper protein intake   -Addressed all questions and concerns  -Follow up with Dr Beatrice Diop for routine foot care        Chief Complaint   Patient presents with   • Follow Up Wound Care Visit     Right lower leg ulcer                Subjective:   74year-old with past medical history as below presents for continued wound treatments to right lower extremity  Patient reports he took antibiotics and tolerated well  He has been doing dressing changes as recommended  He denies pain  Reports redness has resolved  Overall happy with ongoing treatment  No other complaints        The following portions of the patient's history were reviewed and updated as appropriate:   Patient Active Problem List   Diagnosis   • Other insomnia   • Hyperlipidemia   • Hypertension   • Abnormal stress echo   • Elevated fasting glucose   • Restless legs syndrome   • Obesity, morbid [facility])   • Anxiety about health   • Seasonal allergies   • Localized edema    • Callus   • Peripheral arterial disease (HCC)   • Acute respiratory insufficiency   • Bronchitis     Past Medical History:   Diagnosis Date   • Arthritis    • Hyperlipidemia    • Hypertension    • Obesity      Past Surgical History:   Procedure Laterality Date   • CARDIAC SURGERY      heart catherization   • CARPAL TUNNEL RELEASE Bilateral    • COLONOSCOPY     • ELBOW SURGERY Bilateral     implants in place   • FINGER SURGERY Left     pin in place - for arthritis   • GANGLION CYST EXCISION Left    • HAND SURGERY Bilateral    • KNEE SURGERY Left     with repair of meniscus     Social History     Socioeconomic History   • Marital status: /Civil Union     Spouse name: None   • Number of children: None   • Years of education: None   • Highest education level: None   Occupational History   • Occupation: RETIRED   Tobacco Use   • Smoking status: Former     Years: 10 00     Types: Cigarettes   • Smokeless tobacco: Never   Vaping Use   • Vaping Use: Never used   Substance and Sexual Activity   • Alcohol use: Yes     Comment: SOCIAL   • Drug use: No   • Sexual activity: None   Other Topics Concern   • None   Social History Narrative    RETIRED         Social Determinants of Health     Financial Resource Strain: Not on file   Food Insecurity: Not on file   Transportation Needs: Not on file   Physical Activity: Not on file   Stress: Not on file   Social Connections: Not on file   Intimate Partner Violence: Not on file   Housing Stability: Not on file        Current Outpatient Medications:   •  albuterol (Ventolin HFA) 90 mcg/act inhaler, Inhale 2 puffs every 6 (six) hours as needed for wheezing (Patient taking differently: Inhale 2 puffs every 6 (six) hours as needed for wheezing PRN), Disp: 18 g, Rfl: 0  •  ALPRAZolam (XANAX) 0 25 mg tablet, TAKE 1 TABLET BY MOUTH ONCE DAILY AT BEDTIME AS NEEDED FOR ANXIETY OR SLEEP, Disp: 30 tablet, Rfl: 0  •  aspirin 81 MG tablet, Take 81 mg by mouth daily, Disp: , Rfl:   •  azelastine (ASTELIN) 0 1 % nasal spray, 1 spray into each nostril 2 (two) times a day, Disp: 30 mL, Rfl: 11  •  fluticasone (FLONASE) 50 mcg/act nasal spray, 1 spray into each nostril daily, Disp: , Rfl:   •  loratadine (CLARITIN) 10 mg tablet, Take 10 mg by mouth daily (Patient not taking: Reported on 2023), Disp: , Rfl:   •  losartan-hydrochlorothiazide (HYZAAR) 100-25 MG per tablet, Take 1 tablet by mouth daily, Disp: 90 tablet, Rfl: 3  •  metoprolol succinate (Toprol XL) 25 mg 24 hr tablet, Take 1 tablet (25 mg total) by mouth daily, Disp: 30 tablet, Rfl: 5  •  montelukast (SINGULAIR) 10 mg tablet, Take 1 tablet (10 mg total) by mouth daily at bedtime, Disp: 30 tablet, Rfl: 5  •  pravastatin (PRAVACHOL) 40 mg tablet, Take 1 tablet (40 mg total) by mouth daily, Disp: 90 tablet, Rfl: 1  •  sodium chloride (OCEAN) 0 65 % nasal spray, 1 spray into each nostril as needed for congestion, Disp: , Rfl:   •  zolpidem (AMBIEN) 10 mg tablet, Take 1 tablet (10 mg total) by mouth daily at bedtime as needed for sleep, Disp: 30 tablet, Rfl: 0  Family History   Problem Relation Age of Onset   • Rheum arthritis Mother    • Lupus Mother    • Heart disease Father    • Diabetes Sister    • Kidney disease Sister    • Heart disease Paternal Grandmother       Review of Systems   All other systems reviewed and are negative  Allergies: Other      Objective:  /81   Pulse 96   Temp (!) 96 7 °F (35 9 °C)   Resp 18     Physical Exam  Vitals reviewed  Musculoskeletal:      Right lower le+ Edema present  Left lower le+ Edema present  Comments: Right lower extremity ulcer is healed  Moderate xerosis noted to bilateral lower legs  No open lesions or ulcers present at this time  Wound 23 Venous Ulcer Leg Right; Lower (Active)   Wound Image   23 0856   Wound Description Epithelialization; Other (Comment) "04/27/23 0900   Sonya-wound Assessment Dry;Scaly 04/27/23 0900   Wound Length (cm) 0 cm 04/27/23 0900   Wound Width (cm) 0 cm 04/27/23 0900   Wound Depth (cm) 0 cm 04/27/23 0900   Wound Surface Area (cm^2) 0 cm^2 04/27/23 0900   Wound Volume (cm^3) 0 cm^3 04/27/23 0900   Calculated Wound Volume (cm^3) 0 cm^3 04/27/23 0900   Change in Wound Size % 100 04/27/23 0900   Drainage Amount None 04/27/23 0900   Drainage Description Serous; Yellow 04/18/23 1317   Non-staged Wound Description Full thickness 04/18/23 1317   Treatments Cleansed 04/18/23 1317   Patient Tolerance Tolerated well 04/27/23 0900   Dressing Status Intact 04/27/23 0900                         Procedures             Wound Instructions:  Orders Placed This Encounter   Procedures   • Wound cleansing and dressings     Right lower leg ulcer is healed today     Moisturize lower leg 2 times per day, do not put moisiurizer between toes  Monitor lower legs and feet daily for any open wounds     Apply compression socks  to bilateral lower legs       Compression Stocking 15 - 20 mm Hg  Remove compression stockings every night HS and re-apply first thing qAM  Follow daily skin care as instructed  Avoid prolonged standing in one place  Elevate leg(s) above the level of the heart when sitting or as much as possible  Follow up with podiatrist for routine care -- Dr Tran Neither     Standing Status:   Future     Standing Expiration Date:   4/27/2024         Doris Reynaga, DPM      Portions of the record may have been created with voice recognition software  Occasional wrong word or \"sound a like\" substitutions may have occurred due to the inherent limitations of voice recognition software  Read the chart carefully and recognize, using context, where substitutions have occurred        "

## 2023-04-27 NOTE — PATIENT INSTRUCTIONS
Orders Placed This Encounter   Procedures    Wound cleansing and dressings     Right lower leg ulcer is healed today     Moisturize lower leg 2 times per day, do not put moisiurizer between toes  Monitor lower legs and feet daily for any open wounds     Apply compression socks  to bilateral lower legs       Compression Stocking 15 - 20 mm Hg  Remove compression stockings every night HS and re-apply first thing qAM  Follow daily skin care as instructed  Avoid prolonged standing in one place  Elevate leg(s) above the level of the heart when sitting or as much as possible       Follow up with podiatrist for routine care -- Dr Barron Back     Standing Status:   Future     Standing Expiration Date:   4/27/2024

## 2023-05-11 DIAGNOSIS — G47.09 OTHER INSOMNIA: ICD-10-CM

## 2023-05-11 RX ORDER — ZOLPIDEM TARTRATE 10 MG/1
10 TABLET ORAL
Qty: 30 TABLET | Refills: 1 | Status: SHIPPED | OUTPATIENT
Start: 2023-05-11

## 2023-06-06 ENCOUNTER — TELEPHONE (OUTPATIENT)
Dept: INTERNAL MEDICINE CLINIC | Facility: CLINIC | Age: 73
End: 2023-06-06

## 2023-06-06 DIAGNOSIS — G25.81 RESTLESS LEGS SYNDROME: ICD-10-CM

## 2023-06-06 RX ORDER — ROPINIROLE 2 MG/1
2 TABLET, FILM COATED ORAL
Qty: 30 TABLET | Refills: 5 | Status: SHIPPED | OUTPATIENT
Start: 2023-06-06

## 2023-06-14 ENCOUNTER — OFFICE VISIT (OUTPATIENT)
Dept: PODIATRY | Facility: CLINIC | Age: 73
End: 2023-06-14
Payer: MEDICARE

## 2023-06-14 VITALS
WEIGHT: 273 LBS | BODY MASS INDEX: 38.22 KG/M2 | DIASTOLIC BLOOD PRESSURE: 58 MMHG | SYSTOLIC BLOOD PRESSURE: 125 MMHG | HEIGHT: 71 IN | HEART RATE: 101 BPM

## 2023-06-14 DIAGNOSIS — G62.9 NEUROPATHY: Primary | ICD-10-CM

## 2023-06-14 DIAGNOSIS — L84 CALLUS: ICD-10-CM

## 2023-06-14 DIAGNOSIS — B35.1 ONYCHOMYCOSIS: ICD-10-CM

## 2023-06-14 PROCEDURE — 11056 PARNG/CUTG B9 HYPRKR LES 2-4: CPT | Performed by: PODIATRIST

## 2023-06-14 PROCEDURE — 11721 DEBRIDE NAIL 6 OR MORE: CPT | Performed by: PODIATRIST

## 2023-06-14 NOTE — PROGRESS NOTES
Neel Poster  1950  AT RISK FOOT CARE    1  Neuropathy        2  Callus        3  Onychomycosis            Patient presents for at-risk foot care  Patient has no acute concerns today  Patient has significant lower extremity risk due to diminished pulses in the feet and trophic skin changes to the lower extremity including thick toenail, atrophic skin, and decreased hair growth  On exam patient has thickened, hypertrophic, discolored, brittle toenails with subungual debris and tenderness x10   Callus: 4  Patient has diminished pedal pulses and decreased perfusion to the lower extremities  Patient has significant trophic changes to the skin including thick toenails, decreased pedal hair and atrophic skin  Today's treatment includes:  Debridement of toenails  Using nail nipper, jerica, and curette, nails were sharply debrided, reduced in thickness and length  Devitalized nail tissue and fungal debris excised and removed  Patient tolerated well  Discussed proper shoe gear, daily inspections of feet, and general foot health with patient  Patient has Q8  findings and is recommended for at risk foot care every 9-10 weeks  Procedure: All mycotic toenails were reduced and debrided in length, width, and girth using a nail nipper and dremel  All hyperkeratotic skin lesion(s) were sharply pared with a scalpel with no bleeding or evidence of ulceration  Patient tolerated procedure(s) well without complications          Patients most recent complete clinical foot exam was on: 4/12

## 2023-06-16 ENCOUNTER — OFFICE VISIT (OUTPATIENT)
Dept: INTERNAL MEDICINE CLINIC | Facility: CLINIC | Age: 73
End: 2023-06-16
Payer: MEDICARE

## 2023-06-16 VITALS
TEMPERATURE: 98.5 F | DIASTOLIC BLOOD PRESSURE: 66 MMHG | OXYGEN SATURATION: 96 % | WEIGHT: 278.13 LBS | HEIGHT: 71 IN | SYSTOLIC BLOOD PRESSURE: 124 MMHG | BODY MASS INDEX: 38.94 KG/M2 | HEART RATE: 102 BPM

## 2023-06-16 DIAGNOSIS — Z00.00 MEDICARE ANNUAL WELLNESS VISIT, SUBSEQUENT: ICD-10-CM

## 2023-06-16 DIAGNOSIS — J34.3 HYPERTROPHY OF BOTH INFERIOR NASAL TURBINATES: ICD-10-CM

## 2023-06-16 DIAGNOSIS — I10 PRIMARY HYPERTENSION: ICD-10-CM

## 2023-06-16 DIAGNOSIS — R60.0 LOCALIZED EDEMA: Primary | ICD-10-CM

## 2023-06-16 PROCEDURE — 99214 OFFICE O/P EST MOD 30 MIN: CPT | Performed by: PHYSICIAN ASSISTANT

## 2023-06-16 PROCEDURE — G0439 PPPS, SUBSEQ VISIT: HCPCS | Performed by: PHYSICIAN ASSISTANT

## 2023-06-16 RX ORDER — AZELASTINE 1 MG/ML
1 SPRAY, METERED NASAL 2 TIMES DAILY
Qty: 30 ML | Refills: 11 | Status: SHIPPED | OUTPATIENT
Start: 2023-06-16

## 2023-06-16 NOTE — PATIENT INSTRUCTIONS
Medicare Preventive Visit Patient Instructions  Thank you for completing your Welcome to Medicare Visit or Medicare Annual Wellness Visit today  Your next wellness visit will be due in one year (6/16/2024)  The screening/preventive services that you may require over the next 5-10 years are detailed below  Some tests may not apply to you based off risk factors and/or age  Screening tests ordered at today's visit but not completed yet may show as past due  Also, please note that scanned in results may not display below  Preventive Screenings:  Service Recommendations Previous Testing/Comments   Colorectal Cancer Screening  · Colonoscopy    · Fecal Occult Blood Test (FOBT)/Fecal Immunochemical Test (FIT)  · Fecal DNA/Cologuard Test  · Flexible Sigmoidoscopy Age: 39-70 years old   Colonoscopy: every 10 years (May be performed more frequently if at higher risk)  OR  FOBT/FIT: every 1 year  OR  Cologuard: every 3 years  OR  Sigmoidoscopy: every 5 years  Screening may be recommended earlier than age 39 if at higher risk for colorectal cancer  Also, an individualized decision between you and your healthcare provider will decide whether screening between the ages of 74-80 would be appropriate   Colonoscopy: 09/12/2017  FOBT/FIT: Not on file  Cologuard: Not on file  Sigmoidoscopy: Not on file    Screening Current     Prostate Cancer Screening Individualized decision between patient and health care provider in men between ages of 53-78   Medicare will cover every 12 months beginning on the day after your 50th birthday PSA: 0 9 ng/mL     Screening Current     Hepatitis C Screening Once for adults born between 1945 and 1965  More frequently in patients at high risk for Hepatitis C Hep C Antibody: 09/17/2019    Screening Current   Diabetes Screening 1-2 times per year if you're at risk for diabetes or have pre-diabetes Fasting glucose: 140 mg/dL (1/24/2023)  A1C: 5 8 (1/27/2023)  Screening Not Indicated  History Diabetes Cholesterol Screening Once every 5 years if you don't have a lipid disorder  May order more often based on risk factors  Lipid panel: 01/24/2023  Screening Not Indicated  History Lipid Disorder      Other Preventive Screenings Covered by Medicare:  1  Abdominal Aortic Aneurysm (AAA) Screening: covered once if your at risk  You're considered to be at risk if you have a family history of AAA or a male between the age of 73-68 who smoking at least 100 cigarettes in your lifetime  2  Lung Cancer Screening: covers low dose CT scan once per year if you meet all of the following conditions: (1) Age 50-69; (2) No signs or symptoms of lung cancer; (3) Current smoker or have quit smoking within the last 15 years; (4) You have a tobacco smoking history of at least 20 pack years (packs per day x number of years you smoked); (5) You get a written order from a healthcare provider  3  Glaucoma Screening: covered annually if you're considered high risk: (1) You have diabetes OR (2) Family history of glaucoma OR (3)  aged 48 and older OR (3)  American aged 72 and older  3  Osteoporosis Screening: covered every 2 years if you meet one of the following conditions: (1) Have a vertebral abnormality; (2) On glucocorticoid therapy for more than 3 months; (3) Have primary hyperparathyroidism; (4) On osteoporosis medications and need to assess response to drug therapy  5  HIV Screening: covered annually if you're between the age of 12-76  Also covered annually if you are younger than 13 and older than 72 with risk factors for HIV infection  For pregnant patients, it is covered up to 3 times per pregnancy      Immunizations:  Immunization Recommendations   Influenza Vaccine Annual influenza vaccination during flu season is recommended for all persons aged >= 6 months who do not have contraindications   Pneumococcal Vaccine   * Pneumococcal conjugate vaccine = PCV13 (Prevnar 13), PCV15 (Vaxneuvance), PCV20 (Prevnar 20)  * Pneumococcal polysaccharide vaccine = PPSV23 (Pneumovax) Adults 2364 years old: 1-3 doses may be recommended based on certain risk factors  Adults 72 years old: 1-2 doses may be recommended based off what pneumonia vaccine you previously received   Hepatitis B Vaccine 3 dose series if at intermediate or high risk (ex: diabetes, end stage renal disease, liver disease)   Tetanus (Td) Vaccine - COST NOT COVERED BY MEDICARE PART B Following completion of primary series, a booster dose should be given every 10 years to maintain immunity against tetanus  Td may also be given as tetanus wound prophylaxis  Tdap Vaccine - COST NOT COVERED BY MEDICARE PART B Recommended at least once for all adults  For pregnant patients, recommended with each pregnancy  Shingles Vaccine (Shingrix) - COST NOT COVERED BY MEDICARE PART B  2 shot series recommended in those aged 48 and above     Health Maintenance Due:      Topic Date Due   • Colorectal Cancer Screening  09/12/2027   • Hepatitis C Screening  Completed     Immunizations Due:      Topic Date Due   • Pneumococcal Vaccine: 65+ Years (2 - PPSV23 if available, else PCV20) 08/14/2018   • COVID-19 Vaccine (4 - Moderna series) 02/07/2022   • Influenza Vaccine (Season Ended) 09/01/2023     Advance Directives   What are advance directives? Advance directives are legal documents that state your wishes and plans for medical care  These plans are made ahead of time in case you lose your ability to make decisions for yourself  Advance directives can apply to any medical decision, such as the treatments you want, and if you want to donate organs  What are the types of advance directives? There are many types of advance directives, and each state has rules about how to use them  You may choose a combination of any of the following:  · Living will: This is a written record of the treatment you want   You can also choose which treatments you do not want, which to limit, and which to stop at a certain time  This includes surgery, medicine, IV fluid, and tube feedings  · Durable power of  for healthcare [facility]): This is a written record that states who you want to make healthcare choices for you when you are unable to make them for yourself  This person, called a proxy, is usually a family member or a friend  You may choose more than 1 proxy  · Do not resuscitate (DNR) order:  A DNR order is used in case your heart stops beating or you stop breathing  It is a request not to have certain forms of treatment, such as CPR  A DNR order may be included in other types of advance directives  · Medical directive: This covers the care that you want if you are in a coma, near death, or unable to make decisions for yourself  You can list the treatments you want for each condition  Treatment may include pain medicine, surgery, blood transfusions, dialysis, IV or tube feedings, and a ventilator (breathing machine)  · Values history: This document has questions about your views, beliefs, and how you feel and think about life  This information can help others choose the care that you would choose  Why are advance directives important? An advance directive helps you control your care  Although spoken wishes may be used, it is better to have your wishes written down  Spoken wishes can be misunderstood, or not followed  Treatments may be given even if you do not want them  An advance directive may make it easier for your family to make difficult choices about your care  Weight Management   Why it is important to manage your weight:  Being overweight increases your risk of health conditions such as heart disease, high blood pressure, type 2 diabetes, and certain types of cancer  It can also increase your risk for osteoarthritis, sleep apnea, and other respiratory problems  Aim for a slow, steady weight loss  Even a small amount of weight loss can lower your risk of health problems    How to lose weight safely:  A safe and healthy way to lose weight is to eat fewer calories and get regular exercise  You can lose up about 1 pound a week by decreasing the number of calories you eat by 500 calories each day  Healthy meal plan for weight management:  A healthy meal plan includes a variety of foods, contains fewer calories, and helps you stay healthy  A healthy meal plan includes the following:  · Eat whole-grain foods more often  A healthy meal plan should contain fiber  Fiber is the part of grains, fruits, and vegetables that is not broken down by your body  Whole-grain foods are healthy and provide extra fiber in your diet  Some examples of whole-grain foods are whole-wheat breads and pastas, oatmeal, brown rice, and bulgur  · Eat a variety of vegetables every day  Include dark, leafy greens such as spinach, kale, melissa greens, and mustard greens  Eat yellow and orange vegetables such as carrots, sweet potatoes, and winter squash  · Eat a variety of fruits every day  Choose fresh or canned fruit (canned in its own juice or light syrup) instead of juice  Fruit juice has very little or no fiber  · Eat low-fat dairy foods  Drink fat-free (skim) milk or 1% milk  Eat fat-free yogurt and low-fat cottage cheese  Try low-fat cheeses such as mozzarella and other reduced-fat cheeses  · Choose meat and other protein foods that are low in fat  Choose beans or other legumes such as split peas or lentils  Choose fish, skinless poultry (chicken or turkey), or lean cuts of red meat (beef or pork)  Before you cook meat or poultry, cut off any visible fat  · Use less fat and oil  Try baking foods instead of frying them  Add less fat, such as margarine, sour cream, regular salad dressing and mayonnaise to foods  Eat fewer high-fat foods  Some examples of high-fat foods include french fries, doughnuts, ice cream, and cakes  · Eat fewer sweets  Limit foods and drinks that are high in sugar   This includes candy, cookies, regular soda, and sweetened drinks  Exercise:  Exercise at least 30 minutes per day on most days of the week  Some examples of exercise include walking, biking, dancing, and swimming  You can also fit in more physical activity by taking the stairs instead of the elevator or parking farther away from stores  Ask your healthcare provider about the best exercise plan for you  © Copyright [employer] 2018 Information is for End User's use only and may not be sold, redistributed or otherwise used for commercial purposes   All illustrations and images included in CareNotes® are the copyrighted property of A D A M , Inc  or [address] [place] [address]

## 2023-06-16 NOTE — PROGRESS NOTES
Assessment and Plan:     Problem List Items Addressed This Visit        Cardiovascular and Mediastinum    Hypertension    Relevant Medications    losartan (COZAAR) 100 MG tablet    furosemide (LASIX) 20 mg tablet       Other    Localized edema  - Primary     Discontinue losartan hct and change to losartan and lasix  Directions for use and possible side effects discussed and patient verbalized understanding of these  Other Visit Diagnoses     Medicare annual wellness visit, subsequent        Hypertrophy of both inferior nasal turbinates        Relevant Medications    azelastine (ASTELIN) 0 1 % nasal spray          Depression Screening and Follow-up Plan: Patient was screened for depression during today's encounter  They screened negative with a PHQ-2 score of 0  Preventive health issues were discussed with patient, and age appropriate screening tests were ordered as noted in patient's After Visit Summary  Personalized health advice and appropriate referrals for health education or preventive services given if needed, as noted in patient's After Visit Summary  History of Present Illness:     Patient presents for a Medicare Wellness Visit    Pt presents for routine visit  Subsequent AWV also performed  He is up to date with labs and CRC Screening  He is noting issues with lower extremity swelling and weight gain as a result  Denies cough or SOB  He is on losartan hct  Patient Care Team:  Xiomara Enciso PA-C as PCP - General (Internal Medicine)  Martinez Byrne MD (Pulmonary Disease)     Review of Systems:     Review of Systems   Constitutional: Negative for chills and fever  HENT: Negative for congestion, ear pain, hearing loss, postnasal drip, rhinorrhea, sinus pressure, sinus pain, sore throat and trouble swallowing  Eyes: Negative for pain and visual disturbance  Respiratory: Negative for cough, chest tightness, shortness of breath and wheezing      Cardiovascular: Positive for leg swelling  Negative for chest pain and palpitations  Gastrointestinal: Negative for abdominal pain, blood in stool, constipation, diarrhea, nausea and vomiting  Endocrine: Negative for cold intolerance, heat intolerance, polydipsia, polyphagia and polyuria  Genitourinary: Negative for difficulty urinating, dysuria, flank pain and urgency  Musculoskeletal: Negative for arthralgias, back pain, gait problem and myalgias  Skin: Negative for rash  Allergic/Immunologic: Negative  Neurological: Negative for dizziness, weakness, light-headedness and headaches  Hematological: Negative  Psychiatric/Behavioral: Negative for behavioral problems, dysphoric mood and sleep disturbance  The patient is not nervous/anxious           Problem List:     Patient Active Problem List   Diagnosis   • Other insomnia   • Hyperlipidemia   • Hypertension   • Abnormal stress echo   • Elevated fasting glucose   • Restless legs syndrome   • Obesity, morbid (HCC)   • Anxiety about health   • Seasonal allergies   • Localized edema    • Callus   • Peripheral arterial disease (HCC)   • Acute respiratory insufficiency   • Bronchitis      Past Medical and Surgical History:     Past Medical History:   Diagnosis Date   • Arthritis    • Hyperlipidemia    • Hypertension    • Obesity      Past Surgical History:   Procedure Laterality Date   • CARDIAC SURGERY      heart catherization   • CARPAL TUNNEL RELEASE Bilateral    • COLONOSCOPY     • ELBOW SURGERY Bilateral     implants in place   • FINGER SURGERY Left     pin in place - for arthritis   • GANGLION CYST EXCISION Left    • HAND SURGERY Bilateral    • KNEE SURGERY Left     with repair of meniscus      Family History:     Family History   Problem Relation Age of Onset   • Rheum arthritis Mother    • Lupus Mother    • Heart disease Father    • Diabetes Sister    • Kidney disease Sister    • Heart disease Paternal Grandmother       Social History:     Social History     Socioeconomic History   • Marital status: /Civil Union     Spouse name: None   • Number of children: None   • Years of education: None   • Highest education level: None   Occupational History   • Occupation: RETIRED   Tobacco Use   • Smoking status: Former     Years: 10 00     Types: Cigarettes   • Smokeless tobacco: Never   Vaping Use   • Vaping Use: Never used   Substance and Sexual Activity   • Alcohol use: Yes     Comment: SOCIAL   • Drug use: No   • Sexual activity: None   Other Topics Concern   • None   Social History Narrative    RETIRED         Social Determinants of Health     Financial Resource Strain: Low Risk  (6/16/2023)    Overall Financial Resource Strain (CARDIA)    • Difficulty of Paying Living Expenses: Not very hard   Food Insecurity: Not on file   Transportation Needs: No Transportation Needs (6/16/2023)    PRAPARE - Transportation    • Lack of Transportation (Medical): No    • Lack of Transportation (Non-Medical):  No   Physical Activity: Not on file   Stress: Not on file   Social Connections: Not on file   Intimate Partner Violence: Not on file   Housing Stability: Not on file      Medications and Allergies:     Current Outpatient Medications   Medication Sig Dispense Refill   • ALPRAZolam (XANAX) 0 25 mg tablet TAKE 1 TABLET BY MOUTH ONCE DAILY AT BEDTIME AS NEEDED FOR ANXIETY OR SLEEP 30 tablet 0   • aspirin 81 MG tablet Take 81 mg by mouth daily     • azelastine (ASTELIN) 0 1 % nasal spray 1 spray into each nostril 2 (two) times a day 30 mL 11   • fluticasone (FLONASE) 50 mcg/act nasal spray 1 spray into each nostril daily     • furosemide (LASIX) 20 mg tablet Take 1 tablet (20 mg total) by mouth daily as needed (swelling) 30 tablet 5   • losartan (COZAAR) 100 MG tablet Take 1 tablet (100 mg total) by mouth daily 30 tablet 5   • metoprolol succinate (Toprol XL) 25 mg 24 hr tablet Take 1 tablet (25 mg total) by mouth daily 30 tablet 5   • pravastatin (PRAVACHOL) 40 mg tablet Take 1 tablet (40 mg total) by mouth daily 90 tablet 1   • rOPINIRole (REQUIP) 2 mg tablet Take 1 tablet (2 mg total) by mouth daily at bedtime 30 tablet 5   • zolpidem (AMBIEN) 10 mg tablet Take 1 tablet (10 mg total) by mouth daily at bedtime as needed for sleep 30 tablet 1   • albuterol (Ventolin HFA) 90 mcg/act inhaler Inhale 2 puffs every 6 (six) hours as needed for wheezing (Patient not taking: Reported on 6/14/2023) 18 g 0     No current facility-administered medications for this visit  Allergies   Allergen Reactions   • Other      Summertime grass, weeds      Immunizations:     Immunization History   Administered Date(s) Administered   • COVID-19 MODERNA VACC 0 5 ML IM 04/02/2021, 05/03/2021, 12/13/2021   • INFLUENZA 11/03/2016, 09/15/2017, 10/18/2019, 12/04/2019   • Influenza Split High Dose Preservative Free IM 10/18/2019   • Influenza, high dose seasonal 0 7 mL 12/18/2018, 09/14/2021   • Pneumococcal Conjugate 13-Valent 06/19/2018      Health Maintenance:         Topic Date Due   • Colorectal Cancer Screening  09/12/2027   • Hepatitis C Screening  Completed         Topic Date Due   • Pneumococcal Vaccine: 65+ Years (2 - PPSV23 if available, else PCV20) 08/14/2018   • COVID-19 Vaccine (4 - Moderna series) 02/07/2022   • Influenza Vaccine (Season Ended) 09/01/2023      Medicare Screening Tests and Risk Assessments:     Clementine Martínez is here for his Subsequent Wellness visit  Last Medicare Wellness visit information reviewed, patient interviewed and updates made to the record today  Health Risk Assessment:   Patient rates overall health as good  Patient feels that their physical health rating is same  Patient is satisfied with their life  Eyesight was rated as same  Hearing was rated as slightly worse  Patient feels that their emotional and mental health rating is same  Patients states they are never, rarely angry  Patient states they are sometimes unusually tired/fatigued   Pain experienced in the last 7 days has been none  Patient states that he has experienced no weight loss or gain in last 6 months  Depression Screening:   PHQ-2 Score: 0      Fall Risk Screening: In the past year, patient has experienced: no history of falling in past year      Home Safety:  Patient does not have trouble with stairs inside or outside of their home  Patient has working smoke alarms and has working carbon monoxide detector  Home safety hazards include: none  Nutrition:   Current diet is Regular  Medications:   Patient is currently taking over-the-counter supplements  OTC medications include: see medication list  Patient is able to manage medications  Activities of Daily Living (ADLs)/Instrumental Activities of Daily Living (IADLs):   Walk and transfer into and out of bed and chair?: Yes  Dress and groom yourself?: Yes    Bathe or shower yourself?: Yes    Feed yourself? Yes  Do your laundry/housekeeping?: Yes  Manage your money, pay your bills and track your expenses?: Yes  Make your own meals?: Yes    Do your own shopping?: Yes    Previous Hospitalizations:   Any hospitalizations or ED visits within the last 12 months?: No      Advance Care Planning:   Living will: No    Durable POA for healthcare:  Yes    Advanced directive: No      Cognitive Screening:   Provider or family/friend/caregiver concerned regarding cognition?: No    PREVENTIVE SCREENINGS      Cardiovascular Screening:    General: Screening Not Indicated and History Lipid Disorder      Diabetes Screening:     General: Screening Not Indicated and History Diabetes      Colorectal Cancer Screening:     General: Screening Current      Prostate Cancer Screening:    General: Screening Current      Osteoporosis Screening:    General: Screening Not Indicated      Abdominal Aortic Aneurysm (AAA) Screening:    Risk factors include: age between 73-69 yo and tobacco use        Lung Cancer Screening:     General: Screening Not Indicated      Hepatitis C Screening:    General: "Screening Current    Screening, Brief Intervention, and Referral to Treatment (SBIRT)    Screening  Typical number of drinks in a day: 0  Typical number of drinks in a week: 4  Interpretation: Low risk drinking behavior  Single Item Drug Screening:  How often have you used an illegal drug (including marijuana) or a prescription medication for non-medical reasons in the past year? never    Single Item Drug Screen Score: 0  Interpretation: Negative screen for possible drug use disorder    No results found  Physical Exam:     /66 (BP Location: Left arm, Patient Position: Sitting)   Pulse 102   Temp 98 5 °F (36 9 °C)   Ht 5' 11\" (1 803 m)   Wt 126 kg (278 lb 2 oz)   SpO2 96%   BMI 38 79 kg/m²     Physical Exam  Vitals and nursing note reviewed  Constitutional:       General: He is not in acute distress  Appearance: He is well-developed  HENT:      Head: Normocephalic and atraumatic  Nose: Nose normal    Eyes:      Conjunctiva/sclera: Conjunctivae normal    Cardiovascular:      Rate and Rhythm: Normal rate and regular rhythm  Heart sounds: No murmur heard  Pulmonary:      Effort: Pulmonary effort is normal  No respiratory distress  Breath sounds: Normal breath sounds  Abdominal:      Palpations: Abdomen is soft  Tenderness: There is no abdominal tenderness  Musculoskeletal:         General: No swelling  Normal range of motion  Cervical back: Normal range of motion and neck supple  Skin:     General: Skin is warm and dry  Capillary Refill: Capillary refill takes less than 2 seconds  Neurological:      Mental Status: He is alert and oriented to person, place, and time  Psychiatric:         Mood and Affect: Mood normal          Behavior: Behavior normal          Thought Content:  Thought content normal          Judgment: Judgment normal           Eva Smart PA-C  "

## 2023-06-19 RX ORDER — LOSARTAN POTASSIUM 100 MG/1
100 TABLET ORAL DAILY
Qty: 30 TABLET | Refills: 5 | Status: SHIPPED | OUTPATIENT
Start: 2023-06-19

## 2023-06-19 RX ORDER — FUROSEMIDE 20 MG/1
20 TABLET ORAL DAILY PRN
Qty: 30 TABLET | Refills: 5 | Status: SHIPPED | OUTPATIENT
Start: 2023-06-19

## 2023-06-19 NOTE — ASSESSMENT & PLAN NOTE
Discontinue losartan hct and change to losartan and lasix  Directions for use and possible side effects discussed and patient verbalized understanding of these

## 2023-06-25 ENCOUNTER — HOSPITAL ENCOUNTER (OUTPATIENT)
Facility: HOSPITAL | Age: 73
Setting detail: OBSERVATION
Discharge: HOME | End: 2023-06-25
Attending: EMERGENCY MEDICINE | Admitting: INTERNAL MEDICINE
Payer: MEDICARE

## 2023-06-25 ENCOUNTER — APPOINTMENT (EMERGENCY)
Dept: RADIOLOGY | Facility: HOSPITAL | Age: 73
End: 2023-06-25
Payer: MEDICARE

## 2023-06-25 VITALS
DIASTOLIC BLOOD PRESSURE: 79 MMHG | SYSTOLIC BLOOD PRESSURE: 148 MMHG | RESPIRATION RATE: 18 BRPM | WEIGHT: 287.26 LBS | OXYGEN SATURATION: 93 % | HEIGHT: 71 IN | TEMPERATURE: 97.7 F | HEART RATE: 114 BPM | BODY MASS INDEX: 40.22 KG/M2

## 2023-06-25 DIAGNOSIS — J40 BRONCHITIS: ICD-10-CM

## 2023-06-25 DIAGNOSIS — J98.01 BRONCHOSPASM: Primary | ICD-10-CM

## 2023-06-25 DIAGNOSIS — R06.2 WHEEZING: ICD-10-CM

## 2023-06-25 DIAGNOSIS — E66.9 OBESITY: ICD-10-CM

## 2023-06-25 DIAGNOSIS — R06.82 TACHYPNEA: ICD-10-CM

## 2023-06-25 PROBLEM — R06.89 ACUTE RESPIRATORY INSUFFICIENCY: Status: RESOLVED | Noted: 2023-01-29 | Resolved: 2023-06-25

## 2023-06-25 LAB
2HR DELTA HS TROPONIN: -1 NG/L
ALBUMIN SERPL BCP-MCNC: 3.8 G/DL (ref 3.5–5)
ALP SERPL-CCNC: 42 U/L (ref 34–104)
ALT SERPL W P-5'-P-CCNC: 28 U/L (ref 7–52)
ANION GAP SERPL CALCULATED.3IONS-SCNC: 6 MMOL/L
AST SERPL W P-5'-P-CCNC: 21 U/L (ref 13–39)
ATRIAL RATE: 104 BPM
ATRIAL RATE: 89 BPM
BASOPHILS # BLD AUTO: 0.04 THOUSANDS/ÂΜL (ref 0–0.1)
BASOPHILS NFR BLD AUTO: 1 % (ref 0–1)
BILIRUB SERPL-MCNC: 0.35 MG/DL (ref 0.2–1)
BNP SERPL-MCNC: 27 PG/ML (ref 0–100)
BUN SERPL-MCNC: 19 MG/DL (ref 5–25)
CALCIUM SERPL-MCNC: 8.6 MG/DL (ref 8.4–10.2)
CARDIAC TROPONIN I PNL SERPL HS: 8 NG/L
CARDIAC TROPONIN I PNL SERPL HS: 9 NG/L
CHLORIDE SERPL-SCNC: 105 MMOL/L (ref 96–108)
CO2 SERPL-SCNC: 29 MMOL/L (ref 21–32)
CREAT SERPL-MCNC: 1.2 MG/DL (ref 0.6–1.3)
D DIMER PPP FEU-MCNC: 0.39 UG/ML FEU
EOSINOPHIL # BLD AUTO: 0.54 THOUSAND/ÂΜL (ref 0–0.61)
EOSINOPHIL NFR BLD AUTO: 6 % (ref 0–6)
ERYTHROCYTE [DISTWIDTH] IN BLOOD BY AUTOMATED COUNT: 13.6 % (ref 11.6–15.1)
FLUAV RNA RESP QL NAA+PROBE: NEGATIVE
FLUBV RNA RESP QL NAA+PROBE: NEGATIVE
GFR SERPL CREATININE-BSD FRML MDRD: 59 ML/MIN/1.73SQ M
GLUCOSE SERPL-MCNC: 117 MG/DL (ref 65–140)
HCT VFR BLD AUTO: 41.9 % (ref 36.5–49.3)
HGB BLD-MCNC: 13.4 G/DL (ref 12–17)
IMM GRANULOCYTES # BLD AUTO: 0.03 THOUSAND/UL (ref 0–0.2)
IMM GRANULOCYTES NFR BLD AUTO: 0 % (ref 0–2)
LYMPHOCYTES # BLD AUTO: 2.09 THOUSANDS/ÂΜL (ref 0.6–4.47)
LYMPHOCYTES NFR BLD AUTO: 24 % (ref 14–44)
MCH RBC QN AUTO: 31.2 PG (ref 26.8–34.3)
MCHC RBC AUTO-ENTMCNC: 32 G/DL (ref 31.4–37.4)
MCV RBC AUTO: 97 FL (ref 82–98)
MONOCYTES # BLD AUTO: 0.94 THOUSAND/ÂΜL (ref 0.17–1.22)
MONOCYTES NFR BLD AUTO: 11 % (ref 4–12)
NEUTROPHILS # BLD AUTO: 5.11 THOUSANDS/ÂΜL (ref 1.85–7.62)
NEUTS SEG NFR BLD AUTO: 58 % (ref 43–75)
NRBC BLD AUTO-RTO: 0 /100 WBCS
P AXIS: 57 DEGREES
P AXIS: 71 DEGREES
PLATELET # BLD AUTO: 177 THOUSANDS/UL (ref 149–390)
PMV BLD AUTO: 11.7 FL (ref 8.9–12.7)
POTASSIUM SERPL-SCNC: 3.8 MMOL/L (ref 3.5–5.3)
PR INTERVAL: 148 MS
PR INTERVAL: 152 MS
PROCALCITONIN SERPL-MCNC: <0.05 NG/ML
PROT SERPL-MCNC: 6.1 G/DL (ref 6.4–8.4)
QRS AXIS: -42 DEGREES
QRS AXIS: -45 DEGREES
QRSD INTERVAL: 146 MS
QRSD INTERVAL: 146 MS
QT INTERVAL: 402 MS
QT INTERVAL: 440 MS
QTC INTERVAL: 528 MS
QTC INTERVAL: 535 MS
RBC # BLD AUTO: 4.3 MILLION/UL (ref 3.88–5.62)
RSV RNA RESP QL NAA+PROBE: NEGATIVE
SARS-COV-2 RNA RESP QL NAA+PROBE: NEGATIVE
SODIUM SERPL-SCNC: 140 MMOL/L (ref 135–147)
T WAVE AXIS: 34 DEGREES
T WAVE AXIS: 8 DEGREES
VENTRICULAR RATE: 104 BPM
VENTRICULAR RATE: 89 BPM
WBC # BLD AUTO: 8.75 THOUSAND/UL (ref 4.31–10.16)

## 2023-06-25 PROCEDURE — 96374 THER/PROPH/DIAG INJ IV PUSH: CPT

## 2023-06-25 PROCEDURE — 99285 EMERGENCY DEPT VISIT HI MDM: CPT

## 2023-06-25 PROCEDURE — 85379 FIBRIN DEGRADATION QUANT: CPT | Performed by: EMERGENCY MEDICINE

## 2023-06-25 PROCEDURE — 94760 N-INVAS EAR/PLS OXIMETRY 1: CPT

## 2023-06-25 PROCEDURE — 99223 1ST HOSP IP/OBS HIGH 75: CPT | Performed by: INTERNAL MEDICINE

## 2023-06-25 PROCEDURE — 36415 COLL VENOUS BLD VENIPUNCTURE: CPT | Performed by: EMERGENCY MEDICINE

## 2023-06-25 PROCEDURE — 94644 CONT INHLJ TX 1ST HOUR: CPT

## 2023-06-25 PROCEDURE — 83880 ASSAY OF NATRIURETIC PEPTIDE: CPT | Performed by: EMERGENCY MEDICINE

## 2023-06-25 PROCEDURE — 80053 COMPREHEN METABOLIC PANEL: CPT | Performed by: EMERGENCY MEDICINE

## 2023-06-25 PROCEDURE — 94640 AIRWAY INHALATION TREATMENT: CPT

## 2023-06-25 PROCEDURE — 84145 PROCALCITONIN (PCT): CPT | Performed by: EMERGENCY MEDICINE

## 2023-06-25 PROCEDURE — 93010 ELECTROCARDIOGRAM REPORT: CPT | Performed by: INTERNAL MEDICINE

## 2023-06-25 PROCEDURE — 71045 X-RAY EXAM CHEST 1 VIEW: CPT

## 2023-06-25 PROCEDURE — 84484 ASSAY OF TROPONIN QUANT: CPT | Performed by: EMERGENCY MEDICINE

## 2023-06-25 PROCEDURE — NC001 PR NO CHARGE: Performed by: INTERNAL MEDICINE

## 2023-06-25 PROCEDURE — 0241U HB NFCT DS VIR RESP RNA 4 TRGT: CPT | Performed by: EMERGENCY MEDICINE

## 2023-06-25 PROCEDURE — 93005 ELECTROCARDIOGRAM TRACING: CPT

## 2023-06-25 PROCEDURE — 85025 COMPLETE CBC W/AUTO DIFF WBC: CPT | Performed by: EMERGENCY MEDICINE

## 2023-06-25 RX ORDER — PRAVASTATIN SODIUM 40 MG
40 TABLET ORAL DAILY
Status: DISCONTINUED | OUTPATIENT
Start: 2023-06-25 | End: 2023-06-25 | Stop reason: HOSPADM

## 2023-06-25 RX ORDER — METHYLPREDNISOLONE SODIUM SUCCINATE 40 MG/ML
40 INJECTION, POWDER, LYOPHILIZED, FOR SOLUTION INTRAMUSCULAR; INTRAVENOUS EVERY 8 HOURS SCHEDULED
Status: DISCONTINUED | OUTPATIENT
Start: 2023-06-25 | End: 2023-06-25 | Stop reason: HOSPADM

## 2023-06-25 RX ORDER — LEVALBUTEROL INHALATION SOLUTION 1.25 MG/3ML
1.25 SOLUTION RESPIRATORY (INHALATION)
Status: DISCONTINUED | OUTPATIENT
Start: 2023-06-25 | End: 2023-06-25 | Stop reason: HOSPADM

## 2023-06-25 RX ORDER — AZELASTINE 1 MG/ML
1 SPRAY, METERED NASAL 2 TIMES DAILY
Status: DISCONTINUED | OUTPATIENT
Start: 2023-06-25 | End: 2023-06-25 | Stop reason: HOSPADM

## 2023-06-25 RX ORDER — ACETAMINOPHEN 325 MG/1
650 TABLET ORAL EVERY 6 HOURS PRN
Status: DISCONTINUED | OUTPATIENT
Start: 2023-06-25 | End: 2023-06-25 | Stop reason: HOSPADM

## 2023-06-25 RX ORDER — LEVALBUTEROL INHALATION SOLUTION 1.25 MG/3ML
1.25 SOLUTION RESPIRATORY (INHALATION)
Status: DISCONTINUED | OUTPATIENT
Start: 2023-06-25 | End: 2023-06-25

## 2023-06-25 RX ORDER — LOSARTAN POTASSIUM 50 MG/1
100 TABLET ORAL DAILY
Status: DISCONTINUED | OUTPATIENT
Start: 2023-06-25 | End: 2023-06-25 | Stop reason: HOSPADM

## 2023-06-25 RX ORDER — METOPROLOL SUCCINATE 25 MG/1
25 TABLET, EXTENDED RELEASE ORAL DAILY
Status: DISCONTINUED | OUTPATIENT
Start: 2023-06-25 | End: 2023-06-25 | Stop reason: HOSPADM

## 2023-06-25 RX ORDER — SODIUM CHLORIDE FOR INHALATION 0.9 %
3 VIAL, NEBULIZER (ML) INHALATION
Status: DISCONTINUED | OUTPATIENT
Start: 2023-06-25 | End: 2023-06-25

## 2023-06-25 RX ORDER — PREDNISONE 20 MG/1
40 TABLET ORAL DAILY
Qty: 10 TABLET | Refills: 0 | Status: SHIPPED | OUTPATIENT
Start: 2023-06-25 | End: 2023-06-30

## 2023-06-25 RX ORDER — ROPINIROLE 1 MG/1
2 TABLET, FILM COATED ORAL
Status: DISCONTINUED | OUTPATIENT
Start: 2023-06-25 | End: 2023-06-25 | Stop reason: HOSPADM

## 2023-06-25 RX ORDER — FUROSEMIDE 20 MG/1
20 TABLET ORAL DAILY
Status: DISCONTINUED | OUTPATIENT
Start: 2023-06-25 | End: 2023-06-25 | Stop reason: HOSPADM

## 2023-06-25 RX ORDER — GUAIFENESIN 100 MG/5ML
200 SOLUTION ORAL EVERY 4 HOURS PRN
Status: DISCONTINUED | OUTPATIENT
Start: 2023-06-25 | End: 2023-06-25 | Stop reason: HOSPADM

## 2023-06-25 RX ORDER — ALPRAZOLAM 0.25 MG/1
0.25 TABLET ORAL
Status: DISCONTINUED | OUTPATIENT
Start: 2023-06-25 | End: 2023-06-25 | Stop reason: HOSPADM

## 2023-06-25 RX ORDER — ONDANSETRON 2 MG/ML
4 INJECTION INTRAMUSCULAR; INTRAVENOUS EVERY 6 HOURS PRN
Status: DISCONTINUED | OUTPATIENT
Start: 2023-06-25 | End: 2023-06-25

## 2023-06-25 RX ORDER — FLUTICASONE PROPIONATE 50 MCG
1 SPRAY, SUSPENSION (ML) NASAL DAILY
Status: DISCONTINUED | OUTPATIENT
Start: 2023-06-25 | End: 2023-06-25 | Stop reason: HOSPADM

## 2023-06-25 RX ORDER — METHYLPREDNISOLONE SODIUM SUCCINATE 125 MG/2ML
125 INJECTION, POWDER, LYOPHILIZED, FOR SOLUTION INTRAMUSCULAR; INTRAVENOUS ONCE
Status: COMPLETED | OUTPATIENT
Start: 2023-06-25 | End: 2023-06-25

## 2023-06-25 RX ORDER — ALBUTEROL SULFATE 90 UG/1
2 AEROSOL, METERED RESPIRATORY (INHALATION) EVERY 4 HOURS PRN
Qty: 18 G | Refills: 0 | Status: SHIPPED | OUTPATIENT
Start: 2023-06-25

## 2023-06-25 RX ORDER — SODIUM CHLORIDE FOR INHALATION 0.9 %
3 VIAL, NEBULIZER (ML) INHALATION ONCE
Status: COMPLETED | OUTPATIENT
Start: 2023-06-25 | End: 2023-06-25

## 2023-06-25 RX ADMIN — ALBUTEROL SULFATE 10 MG: 2.5 SOLUTION RESPIRATORY (INHALATION) at 05:04

## 2023-06-25 RX ADMIN — METHYLPREDNISOLONE SODIUM SUCCINATE 125 MG: 125 INJECTION, POWDER, FOR SOLUTION INTRAMUSCULAR; INTRAVENOUS at 04:15

## 2023-06-25 RX ADMIN — IPRATROPIUM BROMIDE 1 MG: 0.5 SOLUTION RESPIRATORY (INHALATION) at 05:04

## 2023-06-25 RX ADMIN — ISODIUM CHLORIDE 3 ML: 0.03 SOLUTION RESPIRATORY (INHALATION) at 05:04

## 2023-06-25 NOTE — DISCHARGE SUMMARY
Carmen 43  Discharge- [place] 1950, 74 y o  male MRN: [id number]  Unit/Bed#: ICU 04-01 Encounter: [id number]  Primary Care Provider: Clarice Gosselin, PA-C   Date and time admitted to hospital: 6/25/2023  3:35 AM    * Bronchospasm  Assessment & Plan  · Placed in observation medicine  · Patient had similar presentation in January of this year requiring hospitalization  · During that admission patient was seen and evaluated by pulmonary  · Was advised to follow with pulmonary as an outpatient but never went to follow-up appointment  · We will place on Solu-Medrol 40 mg IV every 8 hours  · Give Xopenex and Atrovent nebs 3 times daily  · Continue prehospital Flonase and Astelin  · Placed on O2 and respiratory protocol  · Consult pulmonary in a m  Prednisone 40 mg x 5 days  Fluticasone 1 puff daily  Albuterol as needed  Outpatient follow-up with pulmonology    Seasonal allergies  Assessment & Plan  · Continue prehospital Flonase daily and Astelin twice daily    Obesity, morbid (HCC)  Assessment & Plan  · Encourage healthy weight loss and supportive care    Hypertension  Assessment & Plan  · Continue prehospital Toprol-XL 25 mg p o  daily, Cozaar 100 mg p o  daily and Lasix 20 mg p o  daily    Hyperlipidemia  Assessment & Plan  · Continue prehospital Pravachol 40 mg p o  daily      Discharging Physician / Practitioner: Fay Oliveira DO  PCP: Clarice Gosselin, PA-C  Admission Date:   Admission Orders (From admission, onward)     Ordered        06/25/23 0536  Place in Observation  Once                      Discharge Date: 06/25/23    Medical Problems     Resolved Problems  Date Reviewed: 6/25/2023   None         Consultations During Hospital Stay: Not applicable    Procedures Performed: Not applicable    Significant Findings / Test Results:     No results found  Incidental Findings: Not applicable    Test Results Pending at Discharge (will require follow up):  Not applicable     Outpatient Tests Requested: Obtain pulmonologist    Reason for Admission: Shortness of breath/wheezing    Hospital Course:     Palma Osborne is a 74 y o  male who presents with shortness of breath x2 days  Patient presents ER for further evaluation treatment of 2-day history of shortness of breath and wheezing  Patient states that he was at a birthday party we had acute onset of dyspnea as well as wheezing and he used 3 doses of his inhaler she stated helped somewhat only to have a return and worsening of his symptoms  While in the ER patient achieved an hour-long nebulizer treatment continues with diffuse wheezing      Patient was recently admitted to our facility in January of this year with similar complaints was seen and evaluated by pulmonary during that admission was advised to follow-up with them as an outpatient  Patient did not keep his pulmonary appointment  Patient does have seasonal allergies is on Flonase and Astelin nasal sprays at home      At time of exam patient is resting comfortably in bed, he does have wheezing audible from across the room and appears to be in mild respiratory distress  On the morning of 6/25/2023, the patient was found to be hemodynamically stable and saturating well on room air  Wheezes auscultated on physical exam   The patient states he feels well enough to be discharged in the hospital   A 5-day course of prednisone 40 mg daily was sent to the patient's pharmacy along with albuterol rescue inhaler and maintenance inhaler in the setting of likely undiagnosed COPD  The patient was strongly encouraged to obtain a pulmonologist in the outpatient setting  He was also encouraged to resume all preadmission medications at all preadmission dosages  He was discharged in stable condition and will transport himself home  Condition at Discharge: stable     Discharge Day Visit / Exam:     Subjective:  Patient seen and examined at bedside   No acute events "overnight  Denies chest pain, SOB, diaphoresis, nausea/vomiting/diarrhea, fevers/chills  Endorses wheezing  Vitals: Blood Pressure: 148/79 (06/25/23 0335)  Pulse: (!) 114 (06/25/23 0623)  Temperature: 97 7 °F (36 5 °C) (06/25/23 0412)  Respirations: 18 (06/25/23 0623)  Height: 5' 11\" (180 3 cm) (06/25/23 0335)  Weight - Scale: 130 kg (287 lb 4 2 oz) (06/25/23 0623)  SpO2: 93 % (06/25/23 1675)     Exam:   Physical Exam  Vitals and nursing note reviewed  Constitutional:       General: He is not in acute distress  Appearance: He is well-developed  HENT:      Head: Normocephalic and atraumatic  Eyes:      Conjunctiva/sclera: Conjunctivae normal    Cardiovascular:      Rate and Rhythm: Normal rate and regular rhythm  Heart sounds: No murmur heard  Pulmonary:      Effort: Pulmonary effort is normal  No respiratory distress  Breath sounds: Wheezing present  Abdominal:      Palpations: Abdomen is soft  Tenderness: There is no abdominal tenderness  Musculoskeletal:         General: No swelling  Cervical back: Neck supple  Skin:     General: Skin is warm and dry  Capillary Refill: Capillary refill takes less than 2 seconds  Neurological:      Mental Status: He is alert  Psychiatric:         Mood and Affect: Mood normal            Discharge instructions/Information to patient and family:   See after visit summary for information provided to patient and family  Provisions for Follow-Up Care:  See after visit summary for information related to follow-up care and any pertinent home health orders  Disposition:     Home with family support  Obtain pulmonologist  Prednisone 40 mg x 5 days  Maintenance and rescue inhalers  Resume preadmission medications     Discharge Statement:  I spent 60 minutes discharging the patient  This time was spent on the day of discharge  I had direct contact with the patient on the day of discharge   Greater than 50% of the total time was spent " examining patient, answering all patient questions, arranging and discussing plan of care with patient as well as directly providing post-discharge instructions  Additional time then spent on discharge activities  Discharge Medications:  See after visit summary for reconciled discharge medications provided to patient and family        ** Please Note: This note has been constructed using a voice recognition system **

## 2023-06-25 NOTE — ASSESSMENT & PLAN NOTE
Continue prehospital Toprol-XL 25 mg p o  daily, Cozaar 100 mg p o  daily and Lasix 20 mg p o  daily

## 2023-06-25 NOTE — NURSING NOTE
Hospitalist in to see patient at this time, ok for discharge home  IV site removed, belongings taken with patient  AVS given and reviewed with patient  All questions/concerns answered  Transported to main entrance via wheelchair with RN at this time

## 2023-06-25 NOTE — ASSESSMENT & PLAN NOTE
· Placed in observation medicine  · Patient had similar presentation in January of this year requiring hospitalization  · During that admission patient was seen and evaluated by pulmonary  · Was advised to follow with pulmonary as an outpatient but never went to follow-up appointment  · We will place on Solu-Medrol 40 mg IV every 8 hours  · Give Xopenex and Atrovent nebs 3 times daily  · Continue prehospital Flonase and Astelin  · Placed on O2 and respiratory protocol  · Consult pulmonary in a m      Prednisone 40 mg x 5 days  Fluticasone 1 puff daily  Albuterol as needed  Outpatient follow-up with pulmonology

## 2023-06-25 NOTE — ED PROVIDER NOTES
History  Chief Complaint   Patient presents with   • Shortness of Breath     Started yesterday with wheezing; feels like bronchitis that he had about a year ago; audible wheezing     76-year-old male with hypertension and hyperlipidemia presents to the emergency department for evaluation of shortness of breath and wheezing  He states symptoms started yesterday evening and acutely worsened tonight  Shortness of breath is associated with some chest tightness  No headache, fever, chills, cough, chest pain, abdominal pain, nausea or vomiting  Patient states he had a similar presentation a few months ago where he was diagnosed with bronchitis  He denies any history of CHF, COPD, or asthma  Prior history of DVT or PE  Prior to Admission Medications   Prescriptions Last Dose Informant Patient Reported? Taking?    ALPRAZolam (XANAX) 0 25 mg tablet   No No   Sig: TAKE 1 TABLET BY MOUTH ONCE DAILY AT BEDTIME AS NEEDED FOR ANXIETY OR SLEEP   albuterol (Ventolin HFA) 90 mcg/act inhaler   No No   Sig: Inhale 2 puffs every 6 (six) hours as needed for wheezing   Patient not taking: Reported on 2023   aspirin 81 MG tablet   Yes No   Sig: Take 81 mg by mouth daily   azelastine (ASTELIN) 0 1 % nasal spray   No No   Si spray into each nostril 2 (two) times a day   fluticasone (FLONASE) 50 mcg/act nasal spray   Yes No   Si spray into each nostril daily   furosemide (LASIX) 20 mg tablet   No No   Sig: Take 1 tablet (20 mg total) by mouth daily as needed (swelling)   losartan (COZAAR) 100 MG tablet   No No   Sig: Take 1 tablet (100 mg total) by mouth daily   metoprolol succinate (Toprol XL) 25 mg 24 hr tablet   No No   Sig: Take 1 tablet (25 mg total) by mouth daily   pravastatin (PRAVACHOL) 40 mg tablet   No No   Sig: Take 1 tablet (40 mg total) by mouth daily   rOPINIRole (REQUIP) 2 mg tablet   No No   Sig: Take 1 tablet (2 mg total) by mouth daily at bedtime   zolpidem (AMBIEN) 10 mg tablet   No No   Sig: Take 1 tablet (10 mg total) by mouth daily at bedtime as needed for sleep      Facility-Administered Medications: None       Past Medical History:   Diagnosis Date   • Arthritis    • Hyperlipidemia    • Hypertension    • Obesity        Past Surgical History:   Procedure Laterality Date   • CARDIAC SURGERY      heart catherization   • CARPAL TUNNEL RELEASE Bilateral    • COLONOSCOPY     • ELBOW SURGERY Bilateral     implants in place   • FINGER SURGERY Left     pin in place - for arthritis   • GANGLION CYST EXCISION Left    • HAND SURGERY Bilateral    • KNEE SURGERY Left     with repair of meniscus       Family History   Problem Relation Age of Onset   • Rheum arthritis Mother    • Lupus Mother    • Heart disease Father    • Diabetes Sister    • Kidney disease Sister    • Heart disease Paternal Grandmother      I have reviewed and agree with the history as documented  E-Cigarette/Vaping   • E-Cigarette Use Never User      E-Cigarette/Vaping Substances   • Nicotine No    • THC No    • CBD No    • Flavoring No    • Other No    • Unknown No      Social History     Tobacco Use   • Smoking status: Former     Years: 10 00     Types: Cigarettes   • Smokeless tobacco: Never   Vaping Use   • Vaping Use: Never used   Substance Use Topics   • Alcohol use: Yes     Comment: SOCIAL   • Drug use: No       Review of Systems   Constitutional: Negative for chills and fever  HENT: Negative for ear pain and sore throat  Eyes: Negative for pain and visual disturbance  Respiratory: Positive for chest tightness and shortness of breath  Negative for cough  Cardiovascular: Negative for chest pain and palpitations  Gastrointestinal: Negative for abdominal pain and vomiting  Genitourinary: Negative for dysuria and hematuria  Musculoskeletal: Negative for arthralgias and back pain  Skin: Negative for color change and rash  Neurological: Negative for seizures and syncope     All other systems reviewed and are negative  Physical Exam  Physical Exam  Vitals and nursing note reviewed  Constitutional:       General: He is not in acute distress  Appearance: He is obese  He is ill-appearing  HENT:      Head: Normocephalic and atraumatic  Right Ear: External ear normal       Left Ear: External ear normal       Nose: Nose normal       Mouth/Throat:      Mouth: Mucous membranes are moist    Eyes:      Extraocular Movements: Extraocular movements intact  Conjunctiva/sclera: Conjunctivae normal       Pupils: Pupils are equal, round, and reactive to light  Cardiovascular:      Rate and Rhythm: Regular rhythm  Tachycardia present  Pulses: Normal pulses  Heart sounds: No murmur heard  Pulmonary:      Effort: Tachypnea present  Breath sounds: Wheezing present  Chest:      Chest wall: No tenderness or crepitus  Abdominal:      Palpations: Abdomen is soft  Tenderness: There is no abdominal tenderness  There is no guarding or rebound  Musculoskeletal:         General: Normal range of motion  Cervical back: Normal range of motion and neck supple  Right lower leg: No tenderness  Edema present  Left lower leg: No tenderness  Edema present  Skin:     General: Skin is warm and dry  Capillary Refill: Capillary refill takes less than 2 seconds  Neurological:      General: No focal deficit present  Mental Status: He is alert     Psychiatric:         Mood and Affect: Mood normal          Behavior: Behavior normal          Vital Signs  ED Triage Vitals   Temperature Pulse Respirations Blood Pressure SpO2   06/25/23 0412 06/25/23 0335 06/25/23 0335 06/25/23 0335 06/25/23 0335   97 7 °F (36 5 °C) (!) 114 18 148/79 93 %      Temp src Heart Rate Source Patient Position - Orthostatic VS BP Location FiO2 (%)   -- 06/25/23 0335 06/25/23 0335 06/25/23 0335 --    Monitor Sitting Left arm       Pain Score       06/25/23 0335       No Pain           Vitals:    06/25/23 0335   BP: 148/79   Pulse: (!) 114   Patient Position - Orthostatic VS: Sitting         Visual Acuity      ED Medications  Medications   albuterol inhalation solution 10 mg (10 mg Nebulization Given 6/25/23 0504)     And   ipratropium (ATROVENT) 0 02 % inhalation solution 1 mg (1 mg Nebulization Given 6/25/23 0504)     And   sodium chloride 0 9 % inhalation solution 3 mL (3 mL Nebulization Given 6/25/23 0504)   methylPREDNISolone sodium succinate (Solu-MEDROL) injection 125 mg (125 mg Intravenous Given 6/25/23 0415)       Diagnostic Studies  Results Reviewed     Procedure Component Value Units Date/Time    COVID/FLU/RSV [[id number]]  (Normal) Collected: 06/25/23 0418    Lab Status: Final result Specimen: Nares from Nose Updated: 06/25/23 0502     SARS-CoV-2 Negative     INFLUENZA A PCR Negative     INFLUENZA B PCR Negative     RSV PCR Negative    Narrative:      FOR PEDIATRIC PATIENTS - copy/paste COVID Guidelines URL to browser: [email or web address]  [facility]    SARS-CoV-2 assay is a Nucleic Acid Amplification assay intended for the  qualitative detection of nucleic acid from SARS-CoV-2 in nasopharyngeal  swabs  Results are for the presumptive identification of SARS-CoV-2 RNA  Positive results are indicative of infection with SARS-CoV-2, the virus  causing COVID-19, but do not rule out bacterial infection or co-infection  with other viruses  Laboratories within the [place] and its  territories are required to report all positive results to the appropriate  public health authorities  Negative results do not preclude SARS-CoV-2  infection and should not be used as the sole basis for treatment or other  patient management decisions  Negative results must be combined with  clinical observations, patient history, and epidemiological information  This test has not been FDA cleared or approved  This test has been authorized by FDA under an Emergency Use Authorization  (EUA)  This test is only authorized for the duration of time the  declaration that circumstances exist justifying the authorization of the  emergency use of an in vitro diagnostic tests for detection of SARS-CoV-2  virus and/or diagnosis of COVID-19 infection under section 564(b)(1) of  the Act, 21 U  S C  872KXF-8(Q)(7), unless the authorization is terminated  or revoked sooner  The test has been validated but independent review by FDA  and CLIA is pending  Test performed using [employer] Gene[employer]: This RT-PCR assay targets N2,  a region unique to SARS-CoV-2  A conserved region in the E-gene was chosen  for pan-Sarbecovirus detection which includes SARS-CoV-2  According to CMS-2020-01-R, this platform meets the definition of high-throughput technology      Comprehensive metabolic panel [[id number]]  (Abnormal) Collected: 06/25/23 0412    Lab Status: Final result Specimen: Blood from Arm, Left Updated: 06/25/23 0456     Sodium 140 mmol/L      Potassium 3 8 mmol/L      Chloride 105 mmol/L      CO2 29 mmol/L      ANION GAP 6 mmol/L      BUN 19 mg/dL      Creatinine 1 20 mg/dL      Glucose 117 mg/dL      Calcium 8 6 mg/dL      AST 21 U/L      ALT 28 U/L      Alkaline Phosphatase 42 U/L      Total Protein 6 1 g/dL      Albumin 3 8 g/dL      Total Bilirubin 0 35 mg/dL      eGFR 59 ml/min/1 73sq m     Narrative:      Blake guidelines for Chronic Kidney Disease (CKD):   •  Stage 1 with normal or high GFR (GFR > 90 mL/min/1 73 square meters)  •  Stage 2 Mild CKD (GFR = 60-89 mL/min/1 73 square meters)  •  Stage 3A Moderate CKD (GFR = 45-59 mL/min/1 73 square meters)  •  Stage 3B Moderate CKD (GFR = 30-44 mL/min/1 73 square meters)  •  Stage 4 Severe CKD (GFR = 15-29 mL/min/1 73 square meters)  •  Stage 5 End Stage CKD (GFR <15 mL/min/1 73 square meters)  Note: GFR calculation is accurate only with a steady state creatinine    Procalcitonin [[id number]]  (Normal) Collected: 06/25/23 0412    Lab Status: Final result Specimen: Blood from Arm, Left Updated: 06/25/23 0456     Procalcitonin <0 05 ng/ml     HS Troponin I 2hr [[id number]]     Lab Status: No result Specimen: Blood     HS Troponin 0hr (reflex protocol) [[id number]]  (Normal) Collected: 06/25/23 0412    Lab Status: Final result Specimen: Blood from Arm, Left Updated: 06/25/23 0454     hs TnI 0hr 9 ng/L     B-Type Natriuretic Peptide(BNP) [[id number]]  (Normal) Collected: 06/25/23 0412    Lab Status: Final result Specimen: Blood from Arm, Left Updated: 06/25/23 0453     BNP 27 pg/mL     D-dimer, quantitative [[id number]]  (Normal) Collected: 06/25/23 0412    Lab Status: Final result Specimen: Blood from Arm, Left Updated: 06/25/23 0445     D-Dimer, Quant 0 39 ug/ml FEU     Narrative: In the evaluation for possible pulmonary embolism, in the appropriate (Well's Score of 4 or less) patient, the age adjusted d-dimer cutoff for this patient can be calculated as:    Age x 0 01 (in ug/mL) for Age-adjusted D-dimer exclusion threshold for a patient over 50 years      CBC and differential [[id number]] Collected: 06/25/23 0412    Lab Status: Final result Specimen: Blood from Arm, Left Updated: 06/25/23 0422     WBC 8 75 Thousand/uL      RBC 4 30 Million/uL      Hemoglobin 13 4 g/dL      Hematocrit 41 9 %      MCV 97 fL      MCH 31 2 pg      MCHC 32 0 g/dL      RDW 13 6 %      MPV 11 7 fL      Platelets 343 Thousands/uL      nRBC 0 /100 WBCs      Neutrophils Relative 58 %      Immat GRANS % 0 %      Lymphocytes Relative 24 %      Monocytes Relative 11 %      Eosinophils Relative 6 %      Basophils Relative 1 %      Neutrophils Absolute 5 11 Thousands/µL      Immature Grans Absolute 0 03 Thousand/uL      Lymphocytes Absolute 2 09 Thousands/µL      Monocytes Absolute 0 94 Thousand/µL      Eosinophils Absolute 0 54 Thousand/µL      Basophils Absolute 0 04 Thousands/µL                  XR chest 1 view portable   ED Interpretation by Alysha Edwards MD (06/25 0426)   No acute cardiopulmonary abnormality, appears unchanged from prior                 Procedures  CriticalCare Time    Date/Time: 6/25/2023 5:32 AM    Performed by: Stephan Alexandra MD  Authorized by: Stephan Alexandra MD    Critical care provider statement:     Critical care time (minutes):  36    Critical care time was exclusive of:  Separately billable procedures and treating other patients and teaching time    Critical care was necessary to treat or prevent imminent or life-threatening deterioration of the following conditions:  Respiratory failure    Critical care was time spent personally by me on the following activities:  Obtaining history from patient or surrogate, development of treatment plan with patient or surrogate, evaluation of patient's response to treatment, examination of patient, ordering and performing treatments and interventions, ordering and review of laboratory studies, re-evaluation of patient's condition, ordering and review of radiographic studies and review of old charts             ED Course  ED Course as of 06/25/23 0532   Sun Jun 25, 2023   0408 EKG interpreted by myself demonstrates sinus tachycardia with occasional PVCs, left axis deviation, right bundle branch block, nonspecific ST segment and T waves   0446 D-Dimer, Quant: 0 39   0530 Patient remains tachypneic with diffuse wheezing, he was updated on results  Given continued increased work of breathing, will admit to medicine for further management and evaluation               HEART Risk Score    Flowsheet Row Most Recent Value   Heart Score Risk Calculator    History 0 Filed at: 06/25/2023 0508   ECG 1 Filed at: 06/25/2023 6574   Age 2 Filed at: 06/25/2023 0425   Risk Factors 2 Filed at: 06/25/2023 5304   Troponin 0 Filed at: 06/25/2023 6081   HEART Score 5 Filed at: 06/25/2023 9062                            Wells' Criteria for PE    Flowsheet Row Most Recent Value   Wells' Criteria for PE    Clinical signs and symptoms of DVT 0 Filed at: 06/25/2023 0446   PE is primary diagnosis or equally likely 3 Filed at: 06/25/2023 0446   HR >100 1 5 Filed at: 06/25/2023 0446   Immobilization at least 3 days or Surgery in the previous 4 weeks --   Previous, objectively diagnosed PE or DVT 0 Filed at: 06/25/2023 0446   Hemoptysis 0 Filed at: 06/25/2023 0446   Malignancy with treatment within 6 months or palliative 0 Filed at: 06/25/2023 0446   Wells' Criteria Total 4 5 Filed at: 06/25/2023 [address] Making  75-year-old male presents emergency department for evaluation of shortness of breath  On exam, he is tachypneic with diffuse audible wheezing  Differential diagnosis includes but is not limited to bronchitis, pneumonia, volume overload, pulmonary embolism, arrhythmia, pneumothorax  Check cardiac work-up in addition to D-dimer and procalcitonin  We will get chest x-ray and reassess  We will treat with heart neb and steroids given bronchospasm  D-dimer was within normal limits  Labs grossly unremarkable  X-ray negative for any obvious infiltrates  On repeat assessment, patient still tachypneic with diffuse wheezing and increased work of breathing, will admit to medicine for further management and evaluation  Suspect his symptomatology is multifactorial in the setting of bronchospasm and obesity hypoventilation  Bronchospasm: acute illness or injury  Obesity: acute illness or injury  Tachypnea: acute illness or injury  Amount and/or Complexity of Data Reviewed  Labs: ordered  Decision-making details documented in ED Course  Radiology: ordered and independent interpretation performed  ECG/medicine tests: ordered and independent interpretation performed  Risk  Prescription drug management  Decision regarding hospitalization            Disposition  Final diagnoses:   Obesity   Bronchospasm   Tachypnea     Time reflects when diagnosis was documented in both MDM as applicable and the Disposition within this note Time User Action Codes Description Comment    6/25/2023  5:30 AM Check, Giselle Gull [E66 9] Obesity     6/25/2023  5:30 AM Check, Sabiha Cuthbert Add [J98 01] Bronchospasm     6/25/2023  5:30 AM Check, Giselle Gull [R06 82] Tachypnea     6/25/2023  5:31 AM Check, Sabiha Cuthbert Modify [E66 9] Obesity     6/25/2023  5:31 AM Check, Sabiha Cuthbert Modify [J98 01] Bronchospasm       ED Disposition     ED Disposition   Admit    Condition   Stable    Date/Time   Sun Jun 25, 2023  5:31 AM    Comment   Case was discussed with roger and the patient's admission status was agreed to be Admission Status: observation status to the service of Dr Fanny Foley  Follow-up Information    None         Patient's Medications   Discharge Prescriptions    No medications on file       No discharge procedures on file      PDMP Review       Value Time User    PDMP Reviewed  Yes 2/13/2023  2:36 PM Susan Brito, [facility]           ED Provider  Electronically Signed by           Yolanda Will MD  06/25/23 3382

## 2023-06-25 NOTE — ASSESSMENT & PLAN NOTE
· Continue prehospital Toprol-XL 25 mg p o  daily, Cozaar 100 mg p o  daily and Lasix 20 mg p o  daily

## 2023-06-25 NOTE — ASSESSMENT & PLAN NOTE
· Placed in observation medicine  · Patient had similar presentation in January of this year requiring hospitalization  · During that admission patient was seen and evaluated by pulmonary  · Was advised to follow with pulmonary as an outpatient but never went to follow-up appointment  · We will place on Solu-Medrol 40 mg IV every 8 hours  · Give Xopenex and Atrovent nebs 3 times daily  · Continue prehospital Flonase and Astelin  · Placed on O2 and respiratory protocol  · Consult pulmonary in a m

## 2023-06-25 NOTE — H&P
[facility]  H&P  Name: Marychuy Sewell 74 y o  male I MRN: [id number]  Unit/Bed#: ICU 04-01 I Date of Admission: 6/25/2023   Date of Service: 6/25/2023 I Hospital Day: 0      Assessment/Plan   * Bronchospasm  Assessment & Plan  · Placed in observation medicine  · Patient had similar presentation in January of this year requiring hospitalization  · During that admission patient was seen and evaluated by pulmonary  · Was advised to follow with pulmonary as an outpatient but never went to follow-up appointment  · We will place on Solu-Medrol 40 mg IV every 8 hours  · Give Xopenex and Atrovent nebs 3 times daily  · Continue prehospital Flonase and Astelin  · Placed on O2 and respiratory protocol  · Consult pulmonary in a m  Prednisone 40 mg x 5 days  Fluticasone 1 puff daily  Albuterol as needed  Outpatient follow-up with pulmonology    Seasonal allergies  Assessment & Plan  · Continue prehospital Flonase daily and Astelin twice daily    Obesity, morbid (HCC)  Assessment & Plan  · Encourage healthy weight loss and supportive care    Hypertension  Assessment & Plan  · Continue prehospital Toprol-XL 25 mg p o  daily, Cozaar 100 mg p o  daily and Lasix 20 mg p o  daily    Hyperlipidemia  Assessment & Plan  · Continue prehospital Pravachol 40 mg p o  daily         Discharging Physician / Practitioner: Ladarius Paz DO  PCP: Reanna Escoto PA-C  Admission Date:   Admission Orders (From admission, onward)     Ordered        06/25/23 0536  Place in Observation  Once                      Discharge Date: 06/25/23    Medical Problems     Resolved Problems  Date Reviewed: 6/25/2023   None         Consultations During Hospital Stay: Not applicable    Procedures Performed: Not applicable    Significant Findings / Test Results:     No results found  Incidental Findings: Not applicable    Test Results Pending at Discharge (will require follow up):  Not applicable     Outpatient Tests Requested: Outpatient follow-up with pulmonology    Reason for Admission: Shortness of breath    Hospital Course:     Joaquin Gillette is a 74 y o  male who presents with shortness of breath x2 days  Patient presents ER for further evaluation treatment of 2-day history of shortness of breath and wheezing  Patient states that he was at a birthday party we had acute onset of dyspnea as well as wheezing and he used 3 doses of his inhaler she stated helped somewhat only to have a return and worsening of his symptoms  While in the ER patient achieved an hour-long nebulizer treatment continues with diffuse wheezing      Patient was recently admitted to our facility in January of this year with similar complaints was seen and evaluated by pulmonary during that admission was advised to follow-up with them as an outpatient  Patient did not keep his pulmonary appointment  Patient does have seasonal allergies is on Flonase and Astelin nasal sprays at home      At time of exam patient is resting comfortably in bed, he does have wheezing audible from across the room and appears to be in mild respiratory distress  On the morning of 6/25/2023, the patient was found to be hemodynamically stable and saturating well on room air  Wheezes auscultated on physical exam   The patient states he feels well enough to be discharged in the hospital   A 5-day course of prednisone 40 mg daily was sent to the patient's pharmacy along with albuterol rescue inhaler and maintenance inhaler in the setting of likely undiagnosed COPD  The patient was strongly encouraged to obtain a pulmonologist in the outpatient setting  He was also encouraged to resume all preadmission medications at all preadmission dosages  He was discharged in stable condition and will transport himself home  Condition at Discharge: stable     Discharge Day Visit / Exam:     Subjective:  Patient seen and examined at bedside  No acute events overnight   Denies chest pain, SOB, "diaphoresis, nausea/vomiting/diarrhea, fevers/chills  Vitals: Blood Pressure: 148/79 (06/25/23 0335)  Pulse: (!) 114 (06/25/23 0623)  Temperature: 97 7 °F (36 5 °C) (06/25/23 0412)  Respirations: 18 (06/25/23 0623)  Height: 5' 11\" (180 3 cm) (06/25/23 0335)  Weight - Scale: 130 kg (287 lb 4 2 oz) (06/25/23 0623)  SpO2: 93 % (06/25/23 5534)     Exam:   Physical Exam  Vitals and nursing note reviewed  Constitutional:       General: He is not in acute distress  Appearance: He is well-developed  HENT:      Head: Normocephalic and atraumatic  Eyes:      Conjunctiva/sclera: Conjunctivae normal    Cardiovascular:      Rate and Rhythm: Normal rate and regular rhythm  Heart sounds: No murmur heard  Pulmonary:      Effort: Pulmonary effort is normal  No respiratory distress  Breath sounds: Wheezing present  Abdominal:      Palpations: Abdomen is soft  Tenderness: There is no abdominal tenderness  Musculoskeletal:         General: No swelling  Cervical back: Neck supple  Skin:     General: Skin is warm and dry  Capillary Refill: Capillary refill takes less than 2 seconds  Neurological:      Mental Status: He is alert  Psychiatric:         Mood and Affect: Mood normal            Discharge instructions/Information to patient and family:   See after visit summary for information provided to patient and family  Provisions for Follow-Up Care:  See after visit summary for information related to follow-up care and any pertinent home health orders  Disposition:     Home with family support  Obtain pulmonologist  Prednisone 40 mg x 5 days  Maintenance and rescue inhalers  Resume preadmission medications     Discharge Statement:  I spent 60 minutes discharging the patient  This time was spent on the day of discharge  I had direct contact with the patient on the day of discharge   Greater than 50% of the total time was spent examining patient, answering all patient questions, " arranging and discussing plan of care with patient as well as directly providing post-discharge instructions  Additional time then spent on discharge activities  Discharge Medications:  See after visit summary for reconciled discharge medications provided to patient and family        ** Please Note: This note has been constructed using a voice recognition system **

## 2023-06-25 NOTE — H&P
[address]  H&P  Name: Issa Morel 74 y o  male I MRN: [id number]  Unit/Bed#: ED 24 I Date of Admission: 6/25/2023   Date of Service: 6/25/2023 I Hospital Day: 0      Assessment/Plan   * Bronchospasm  Assessment & Plan  · Placed in observation medicine  · Patient had similar presentation in January of this year requiring hospitalization  · During that admission patient was seen and evaluated by pulmonary  · Was advised to follow with pulmonary as an outpatient but never went to follow-up appointment  · We will place on Solu-Medrol 40 mg IV every 8 hours  · Give Xopenex and Atrovent nebs 3 times daily  · Continue prehospital Flonase and Astelin  · Placed on O2 and respiratory protocol  · Consult pulmonary in a m  Seasonal allergies  Assessment & Plan  Continue prehospital Flonase daily and Astelin twice daily    Obesity, morbid (HCC)  Assessment & Plan  Encourage healthy weight loss and supportive care    Hypertension  Assessment & Plan  Continue prehospital Toprol-XL 25 mg p o  daily, Cozaar 100 mg p o  daily and Lasix 20 mg p o  daily    Hyperlipidemia  Assessment & Plan  Continue prehospital Pravachol 40 mg p o  daily         VTE Prophylaxis: Patient is low risk, will ambulate  Code Status: Level 1  POLST: There is no POLST form on file for this patient (pre-hospital)  Discussion with family: None present at bedside at time of exam    Anticipated Length of Stay:  Patient will be admitted on an Observation basis with an anticipated length of stay of  < 2 midnights  Justification for Hospital Stay: Bronchospasm requiring O2 support, frequent nebulizer treatments, IV steroids and further pulmonary evaluation    Chief Complaint:   Shortness of breath x2 days    History of Present Illness:    Issa Morel is a 74 y o  male who presents with shortness of breath x2 days  Patient presents ER for further evaluation treatment of 2-day history of shortness of breath and wheezing  Patient states that he was at a birthday party we had acute onset of dyspnea as well as wheezing and he used 3 doses of his inhaler she stated helped somewhat only to have a return and worsening of his symptoms  While in the ER patient achieved an hour-long nebulizer treatment continues with diffuse wheezing  Patient was recently admitted to our facility in January of this year with similar complaints was seen and evaluated by pulmonary during that admission was advised to follow-up with them as an outpatient  Patient did not keep his pulmonary appointment  Patient does have seasonal allergies is on Flonase and Astelin nasal sprays at home  At time of exam patient is resting comfortably in bed, he does have wheezing audible from across the room and appears to be in mild respiratory distress  Review of Systems:  Review of Systems   Constitutional: Negative for chills and fever  Respiratory: Positive for cough, shortness of breath and wheezing  Cardiovascular: Negative for chest pain and palpitations  Gastrointestinal: Negative for abdominal pain, diarrhea, nausea and vomiting  Genitourinary: Negative for dysuria, frequency, hematuria and urgency  Neurological: Negative for weakness, light-headedness and headaches  All other systems reviewed and are negative  Past Medical and Surgical History:   Past Medical History:   Diagnosis Date   • Arthritis    • Hyperlipidemia    • Hypertension    • Obesity        Past Surgical History:   Procedure Laterality Date   • CARDIAC SURGERY      heart catherization   • CARPAL TUNNEL RELEASE Bilateral    • COLONOSCOPY     • ELBOW SURGERY Bilateral     implants in place   • FINGER SURGERY Left     pin in place - for arthritis   • GANGLION CYST EXCISION Left    • HAND SURGERY Bilateral    • KNEE SURGERY Left     with repair of meniscus       Meds/Allergies:  Prior to Admission medications    Medication Sig Start Date End Date Taking?  Authorizing Provider albuterol (Ventolin HFA) 90 mcg/act inhaler Inhale 2 puffs every 6 (six) hours as needed for wheezing  Patient not taking: Reported on 6/14/2023 1/30/23   Ernesto Love MD   ALPRAZolam Kiana Lei) 0 25 mg tablet TAKE 1 TABLET BY MOUTH ONCE DAILY AT BEDTIME AS NEEDED FOR ANXIETY OR SLEEP 12/2/22   Eva Smart PA-C   aspirin 81 MG tablet Take 81 mg by mouth daily    Historical Provider, MD   azelastine (ASTELIN) 0 1 % nasal spray 1 spray into each nostril 2 (two) times a day 6/16/23   Eva Smart PA-C   fluticasone (FLONASE) 50 mcg/act nasal spray 1 spray into each nostril daily    Historical Provider, MD   furosemide (LASIX) 20 mg tablet Take 1 tablet (20 mg total) by mouth daily as needed (swelling) 6/19/23   Eva Smart PA-C   losartan (COZAAR) 100 MG tablet Take 1 tablet (100 mg total) by mouth daily 6/19/23   Eva Smart PA-C   metoprolol succinate (Toprol XL) 25 mg 24 hr tablet Take 1 tablet (25 mg total) by mouth daily 12/12/22   Eva Smart PA-C   pravastatin (PRAVACHOL) 40 mg tablet Take 1 tablet (40 mg total) by mouth daily 3/21/23   Eva Smart PA-C   rOPINIRole (REQUIP) 2 mg tablet Take 1 tablet (2 mg total) by mouth daily at bedtime 6/6/23   Eva Smart PA-C   zolpidem (AMBIEN) 10 mg tablet Take 1 tablet (10 mg total) by mouth daily at bedtime as needed for sleep 5/11/23   Hannah Lynn PA-C     I have reviewed home medications with patient personally  Allergies:    Allergies   Allergen Reactions   • Other      Summertime grass, weeds       Social History:  Marital Status: /Civil Union   Occupation: Retired printer  Patient Pre-hospital Living Situation: Resides at home with wife  Patient Pre-hospital Level of Mobility: Full without assist  Patient Pre-hospital Diet Restrictions: None    Social History     Substance and Sexual Activity   Alcohol Use Yes    Comment: SOCIAL     Social History     Tobacco Use   Smoking Status Former   • Years: 10 00   • "Types: Cigarettes   Smokeless Tobacco Never     Social History     Substance and Sexual Activity   Drug Use No       Family History:  I have reviewed the patients family history    Physical Exam:   Vitals:   Blood Pressure: 148/79 (06/25/23 0335)  Pulse: (!) 114 (06/25/23 0335)  Temperature: 97 7 °F (36 5 °C) (06/25/23 0412)  Respirations: 18 (06/25/23 0335)  Height: 5' 11\" (180 3 cm) (06/25/23 0335)  Weight - Scale: 122 kg (270 lb) (06/25/23 0335)  SpO2: 93 % (06/25/23 0506)    Physical Exam  Vitals and nursing note reviewed  Constitutional:       General: He is not in acute distress  Appearance: Normal appearance  He is obese  HENT:      Head: Normocephalic and atraumatic  Right Ear: Tympanic membrane normal       Left Ear: Tympanic membrane normal       Nose: Nose normal       Mouth/Throat:      Mouth: Mucous membranes are moist       Pharynx: No oropharyngeal exudate or posterior oropharyngeal erythema  Eyes:      Extraocular Movements: Extraocular movements intact  Pupils: Pupils are equal, round, and reactive to light  Cardiovascular:      Rate and Rhythm: Normal rate and regular rhythm  Pulses: Normal pulses  Heart sounds: Normal heart sounds  Pulmonary:      Effort: Respiratory distress present  Breath sounds: Wheezing present  Comments: Mild respiratory distress with increased work of breathing  Abdominal:      General: Abdomen is flat  Bowel sounds are normal       Palpations: Abdomen is soft  Musculoskeletal:         General: Normal range of motion  Cervical back: Normal range of motion and neck supple  Right lower leg: Edema present  Left lower leg: Edema present  Skin:     General: Skin is warm and dry  Capillary Refill: Capillary refill takes less than 2 seconds  Neurological:      General: No focal deficit present  Mental Status: He is alert and oriented to person, place, and time           Additional Data:   Lab Results: I have " personally reviewed pertinent reports  Results from last 7 days   Lab Units 06/25/23  0412   WBC Thousand/uL 8 75   HEMOGLOBIN g/dL 13 4   HEMATOCRIT % 41 9   PLATELETS Thousands/uL 177   NEUTROS PCT % 58   LYMPHS PCT % 24   MONOS PCT % 11   EOS PCT % 6     Results from last 7 days   Lab Units 06/25/23  0412   SODIUM mmol/L 140   POTASSIUM mmol/L 3 8   CHLORIDE mmol/L 105   CO2 mmol/L 29   BUN mg/dL 19   CREATININE mg/dL 1 20   CALCIUM mg/dL 8 6   ALK PHOS U/L 42   ALT U/L 28   AST U/L 21                   Imaging: I have personally reviewed pertinent reports  XR chest 1 view portable   ED Interpretation by Ayaan Quiñonez MD (06/25 0426)   No acute cardiopulmonary abnormality, appears unchanged from prior          EKG, Pathology, and Other Studies Reviewed on Admission:   · EKG: N/A    Epic Records Reviewed: Yes     ** Please Note: This note has been constructed using a voice recognition system   **

## 2023-06-26 ENCOUNTER — TRANSITIONAL CARE MANAGEMENT (OUTPATIENT)
Dept: INTERNAL MEDICINE CLINIC | Facility: CLINIC | Age: 73
End: 2023-06-26

## 2023-06-28 ENCOUNTER — OFFICE VISIT (OUTPATIENT)
Dept: INTERNAL MEDICINE CLINIC | Facility: CLINIC | Age: 73
End: 2023-06-28
Payer: MEDICARE

## 2023-06-28 VITALS
HEART RATE: 111 BPM | OXYGEN SATURATION: 95 % | BODY MASS INDEX: 39.53 KG/M2 | DIASTOLIC BLOOD PRESSURE: 72 MMHG | WEIGHT: 282.38 LBS | TEMPERATURE: 98.1 F | SYSTOLIC BLOOD PRESSURE: 132 MMHG | HEIGHT: 71 IN

## 2023-06-28 DIAGNOSIS — I10 PRIMARY HYPERTENSION: ICD-10-CM

## 2023-06-28 DIAGNOSIS — G47.09 OTHER INSOMNIA: ICD-10-CM

## 2023-06-28 DIAGNOSIS — J98.01 BRONCHOSPASM: Primary | ICD-10-CM

## 2023-06-28 DIAGNOSIS — N18.30 STAGE 3 CHRONIC KIDNEY DISEASE, UNSPECIFIED WHETHER STAGE 3A OR 3B CKD (HCC): ICD-10-CM

## 2023-06-28 PROCEDURE — 99496 TRANSJ CARE MGMT HIGH F2F 7D: CPT | Performed by: PHYSICIAN ASSISTANT

## 2023-06-28 RX ORDER — ZOLPIDEM TARTRATE 10 MG/1
10 TABLET ORAL
Qty: 30 TABLET | Refills: 5 | Status: SHIPPED | OUTPATIENT
Start: 2023-06-28

## 2023-06-28 RX ORDER — METOPROLOL SUCCINATE 25 MG/1
25 TABLET, EXTENDED RELEASE ORAL DAILY
Qty: 30 TABLET | Refills: 5 | Status: SHIPPED | OUTPATIENT
Start: 2023-06-28

## 2023-06-28 NOTE — PROGRESS NOTES
Transition of Care  Follow-up After Hospitalization    Ellie Pace 74 y o  male   Date:  6/28/2023    TCM Call     Date and time call was made  6/26/2023 12:39 PM    Hospital care reviewed  Records reviewed    Patient was hospitialized at  [address]    Date of Admission  06/25/23    Date of discharge  06/25/23    Diagnosis  Bronchospasm    Disposition  Home    Were the patients medications reviewed and updated  Yes    Current Symptoms  None      TCM Call     Post hospital issues  None    Should patient be enrolled in anticoag monitoring? No    Scheduled for follow up? Yes    Did you obtain your prescribed medications  Yes    Do you need help managing your prescriptions or medications  No    Is transportation to your appointment needed  No    I have advised the patient to call PCP with any new or worsening symptoms  josh schwartz ma    Are you recieving any outpatient services  No    Are you recieving home care services  No    Are you using any community resources  No    Current waiver services  No    Have you fallen in the last 12 months  No    Interperter language line needed  No    Counseling  Patient    Counseling topics  Diagnostic results; instructions for management; Risk factor reduction; Prognosis; patient and family education; Importance of RX compliance; Activities of daily living        [address] [place] [address] Date: 6/5  Discharge Date: 6/5  Diagnosis: wheezing  Location: [facility] records were reviewed  Medications upon discharge reviewed/updated  Medication Changes: arnuity and albuterol started  Imaging: CXR  Consults: none  Discharge Disposition:  home  Follow up visits with other specialists:pulmonary      Assessment and Plan:    1  Brochospasm: Continue Arnuity and albuterol as directed  Pulmonary referral provided  Maddie Howard was seen today for transition of care management      Diagnoses and all orders for this visit:    Bronchospasm  -     Ambulatory Referral to Pulmonology; Future    Stage 3 chronic kidney disease, unspecified whether stage 3a or 3b CKD (HCC)    Other insomnia  -     zolpidem (AMBIEN) 10 mg tablet; Take 1 tablet (10 mg total) by mouth daily at bedtime as needed for sleep    Primary hypertension  -     metoprolol succinate (Toprol XL) 25 mg 24 hr tablet; Take 1 tablet (25 mg total) by mouth daily            HPI:  Pt presents for TCM  He went to [facility] on 6/25 due to SOB and wheezing for 2 days that worsened after being at his daughters house and she has multiple pets  He was given 1 hour nebulizer  He was admitted for observation  CXR normal  He was discharged home with prednisone, albuterol prn and arnuity  He is agreeable to pulmonary consult  ROS: Review of Systems   Constitutional: Negative for chills and fever  HENT: Negative for congestion, ear pain, hearing loss, postnasal drip, rhinorrhea, sinus pressure, sinus pain, sore throat and trouble swallowing  Eyes: Negative for pain and visual disturbance  Respiratory: Negative for cough, chest tightness, shortness of breath and wheezing  Cardiovascular: Negative  Negative for chest pain, palpitations and leg swelling  Gastrointestinal: Negative for abdominal pain, blood in stool, constipation, diarrhea, nausea and vomiting  Endocrine: Negative for cold intolerance, heat intolerance, polydipsia, polyphagia and polyuria  Genitourinary: Negative for difficulty urinating, dysuria, flank pain and urgency  Musculoskeletal: Negative for arthralgias, back pain, gait problem and myalgias  Skin: Negative for rash  Allergic/Immunologic: Negative  Neurological: Negative for dizziness, weakness, light-headedness and headaches  Hematological: Negative  Psychiatric/Behavioral: Negative for behavioral problems, dysphoric mood and sleep disturbance  The patient is not nervous/anxious          Past Medical History:   Diagnosis Date   • Arthritis    • Hyperlipidemia    • Hypertension    • Obesity    • Stage 3 chronic kidney disease, unspecified whether stage 3a or 3b CKD ([address] ) 6/28/2023       Past Surgical History:   Procedure Laterality Date   • CARDIAC SURGERY      heart catherization   • CARPAL TUNNEL RELEASE Bilateral    • COLONOSCOPY     • ELBOW SURGERY Bilateral     implants in place   • FINGER SURGERY Left     pin in place - for arthritis   • GANGLION CYST EXCISION Left    • HAND SURGERY Bilateral    • KNEE SURGERY Left     with repair of meniscus       Social History     Socioeconomic History   • Marital status: /Civil Union     Spouse name: None   • Number of children: None   • Years of education: None   • Highest education level: None   Occupational History   • Occupation: RETIRED   Tobacco Use   • Smoking status: Former     Years: 10 00     Types: Cigarettes   • Smokeless tobacco: Never   Vaping Use   • Vaping Use: Never used   Substance and Sexual Activity   • Alcohol use: Yes     Comment: SOCIAL   • Drug use: No   • Sexual activity: None   Other Topics Concern   • None   Social History Narrative    RETIRED         Social Determinants of Health     Financial Resource Strain: Low Risk  (6/16/2023)    Overall Financial Resource Strain (CARDIA)    • Difficulty of Paying Living Expenses: Not very hard   Food Insecurity: Not on file   Transportation Needs: No Transportation Needs (6/16/2023)    PRAPARE - Transportation    • Lack of Transportation (Medical): No    • Lack of Transportation (Non-Medical):  No   Physical Activity: Not on file   Stress: Not on file   Social Connections: Not on file   Intimate Partner Violence: Not on file   Housing Stability: Not on file       Family History   Problem Relation Age of Onset   • Rheum arthritis Mother    • Lupus Mother    • Heart disease Father    • Diabetes Sister    • Kidney disease Sister    • Heart disease Paternal Grandmother        Allergies   Allergen Reactions   • Other      Summertime grass, weeds         Current Outpatient Medications:   • "albuterol (Ventolin HFA) 90 mcg/act inhaler, Inhale 2 puffs every 4 (four) hours as needed for wheezing or shortness of breath, Disp: 18 g, Rfl: 0  •  ALPRAZolam (XANAX) 0 25 mg tablet, TAKE 1 TABLET BY MOUTH ONCE DAILY AT BEDTIME AS NEEDED FOR ANXIETY OR SLEEP, Disp: 30 tablet, Rfl: 0  •  aspirin 81 MG tablet, Take 81 mg by mouth daily, Disp: , Rfl:   •  azelastine (ASTELIN) 0 1 % nasal spray, 1 spray into each nostril 2 (two) times a day, Disp: 30 mL, Rfl: 11  •  fluticasone (ARNUITY ELLIPTA) 100 MCG/ACT AEPB inhaler, Inhale 1 puff daily Rinse mouth after use , Disp: 30 blister, Rfl: 0  •  fluticasone (FLONASE) 50 mcg/act nasal spray, 1 spray into each nostril daily, Disp: , Rfl:   •  furosemide (LASIX) 20 mg tablet, Take 1 tablet (20 mg total) by mouth daily as needed (swelling), Disp: 30 tablet, Rfl: 5  •  losartan (COZAAR) 100 MG tablet, Take 1 tablet (100 mg total) by mouth daily, Disp: 30 tablet, Rfl: 5  •  metoprolol succinate (Toprol XL) 25 mg 24 hr tablet, Take 1 tablet (25 mg total) by mouth daily, Disp: 30 tablet, Rfl: 5  •  pravastatin (PRAVACHOL) 40 mg tablet, Take 1 tablet (40 mg total) by mouth daily, Disp: 90 tablet, Rfl: 1  •  predniSONE 20 mg tablet, Take 2 tablets (40 mg total) by mouth daily for 5 days, Disp: 10 tablet, Rfl: 0  •  rOPINIRole (REQUIP) 2 mg tablet, Take 1 tablet (2 mg total) by mouth daily at bedtime, Disp: 30 tablet, Rfl: 5  •  zolpidem (AMBIEN) 10 mg tablet, Take 1 tablet (10 mg total) by mouth daily at bedtime as needed for sleep, Disp: 30 tablet, Rfl: 5      Physical Exam:  /72 (BP Location: Left arm, Patient Position: Sitting)   Pulse (!) 111   Temp 98 1 °F (36 7 °C)   Ht 5' 11\" (1 803 m)   Wt 128 kg (282 lb 6 oz)   SpO2 95%   BMI 39 38 kg/m²     Physical Exam  Vitals and nursing note reviewed  Constitutional:       General: He is not in acute distress  Appearance: He is well-developed  He is not diaphoretic  HENT:      Head: Normocephalic and atraumatic   " Right Ear: External ear normal       Left Ear: External ear normal       Nose: Nose normal       Mouth/Throat:      Pharynx: No oropharyngeal exudate  Eyes:      General: No scleral icterus  Right eye: No discharge  Left eye: No discharge  Conjunctiva/sclera: Conjunctivae normal       Pupils: Pupils are equal, round, and reactive to light  Neck:      Thyroid: No thyromegaly  Cardiovascular:      Rate and Rhythm: Normal rate and regular rhythm  Heart sounds: Normal heart sounds  No murmur heard  No friction rub  No gallop  Pulmonary:      Effort: Pulmonary effort is normal  No respiratory distress  Breath sounds: Normal breath sounds  No wheezing or rales  Abdominal:      General: Bowel sounds are normal  There is no distension  Palpations: Abdomen is soft  Tenderness: There is no abdominal tenderness  Musculoskeletal:         General: No tenderness or deformity  Normal range of motion  Cervical back: Normal range of motion and neck supple  Skin:     General: Skin is warm and dry  Neurological:      Mental Status: He is alert and oriented to person, place, and time  Cranial Nerves: No cranial nerve deficit  Psychiatric:         Behavior: Behavior normal          Thought Content:  Thought content normal          Judgment: Judgment normal              Labs:  Lab Results   Component Value Date    WBC 8 75 06/25/2023    HGB 13 4 06/25/2023    HCT 41 9 06/25/2023    MCV 97 06/25/2023     06/25/2023     Lab Results   Component Value Date    K 3 8 06/25/2023     06/25/2023    CO2 29 06/25/2023    BUN 19 06/25/2023    CREATININE 1 20 06/25/2023    GLUF 140 (H) 01/24/2023    CALCIUM 8 6 06/25/2023    AST 21 06/25/2023    ALT 28 06/25/2023    ALKPHOS 42 06/25/2023    EGFR 59 06/25/2023

## 2023-07-10 DIAGNOSIS — J40 BRONCHITIS: ICD-10-CM

## 2023-07-10 DIAGNOSIS — R06.2 WHEEZING: ICD-10-CM

## 2023-07-10 RX ORDER — ALBUTEROL SULFATE 90 UG/1
2 AEROSOL, METERED RESPIRATORY (INHALATION) EVERY 4 HOURS PRN
Qty: 18 G | Refills: 2 | Status: SHIPPED | OUTPATIENT
Start: 2023-07-10

## 2023-07-14 ENCOUNTER — OFFICE VISIT (OUTPATIENT)
Dept: INTERNAL MEDICINE CLINIC | Facility: CLINIC | Age: 73
End: 2023-07-14
Payer: MEDICARE

## 2023-07-14 VITALS
WEIGHT: 274.13 LBS | DIASTOLIC BLOOD PRESSURE: 72 MMHG | BODY MASS INDEX: 38.38 KG/M2 | OXYGEN SATURATION: 97 % | HEIGHT: 71 IN | HEART RATE: 116 BPM | SYSTOLIC BLOOD PRESSURE: 136 MMHG | TEMPERATURE: 99 F

## 2023-07-14 DIAGNOSIS — J98.01 BRONCHOSPASM: Primary | ICD-10-CM

## 2023-07-14 DIAGNOSIS — I10 PRIMARY HYPERTENSION: ICD-10-CM

## 2023-07-14 DIAGNOSIS — R60.0 LOCALIZED EDEMA: ICD-10-CM

## 2023-07-14 PROCEDURE — 99214 OFFICE O/P EST MOD 30 MIN: CPT | Performed by: PHYSICIAN ASSISTANT

## 2023-07-14 RX ORDER — METHYLPREDNISOLONE 4 MG/1
TABLET ORAL
Qty: 21 EACH | Refills: 0 | Status: SHIPPED | OUTPATIENT
Start: 2023-07-14

## 2023-07-14 RX ORDER — FUROSEMIDE 40 MG/1
40 TABLET ORAL DAILY PRN
Qty: 30 TABLET | Refills: 0 | Status: SHIPPED | OUTPATIENT
Start: 2023-07-14

## 2023-07-14 NOTE — ASSESSMENT & PLAN NOTE
Medrol dose pack ordered. If lack of improvement or if improvement is short lived will get PFTS and start daily inhaler. Continue albuterol prn.

## 2023-07-14 NOTE — PROGRESS NOTES
Name: Chery Agustin      :       MRN: [id number]  Encounter Provider: Thomas Langley PA-C  Encounter Date: 2023   Encounter department: [id number] [place] [address] [place]     1. Bronchospasm  Assessment & Plan:  Medrol dose pack ordered. If lack of improvement or if improvement is short lived will get PFTS and start daily inhaler. Continue albuterol prn. Orders:  -     methylPREDNISolone 4 MG tablet therapy pack; Use as directed on package    2. Primary hypertension  -     furosemide (LASIX) 40 mg tablet; Take 1 tablet (40 mg total) by mouth daily as needed (swelling)    3. Localized edema   Assessment & Plan:  Continue lasix but increase to 40mg daily. Recheck 1 month             Subjective      Wheezing  The current episode started in the past 7 days. The problem occurs constantly. The problem is unchanged. The problem is mild. Associated symptoms include coughing, leg swelling and wheezing. Pertinent negatives include no chest pain, chest pressure, dizziness, palpitations, rhinorrhea, sore throat, stridor or sweats. The symptoms are aggravated by activity. There was no intake of a foreign body. He has had no prior steroid use. Treatments tried: albuterol. The treatment provided mild relief. His past medical history is significant for allergies. There is no history of anxiety/panic attacks, asthma, bronchiolitis, PE, spontaneous pneumothorax or tobacco use. He has been behaving normally. Urine output has been normal.     Review of Systems   Constitutional: Negative for chills and fever. HENT: Negative for congestion, ear pain, hearing loss, postnasal drip, rhinorrhea, sinus pressure, sinus pain, sore throat and trouble swallowing. Eyes: Negative for pain and visual disturbance. Respiratory: Positive for cough and wheezing. Negative for chest tightness, shortness of breath and stridor. Cardiovascular: Positive for leg swelling.  Negative for chest pain and palpitations. Gastrointestinal: Negative for abdominal pain, blood in stool, constipation, diarrhea, nausea and vomiting. Endocrine: Negative for cold intolerance, heat intolerance, polydipsia, polyphagia and polyuria. Genitourinary: Negative for difficulty urinating, dysuria, flank pain and urgency. Musculoskeletal: Negative for arthralgias, back pain, gait problem and myalgias. Skin: Negative for rash. Allergic/Immunologic: Negative. Neurological: Negative for dizziness, weakness, light-headedness and headaches. Hematological: Negative. Psychiatric/Behavioral: Negative for behavioral problems, dysphoric mood and sleep disturbance. The patient is not nervous/anxious.         Current Outpatient Medications on File Prior to Visit   Medication Sig   • albuterol (Ventolin HFA) 90 mcg/act inhaler Inhale 2 puffs every 4 (four) hours as needed for wheezing or shortness of breath   • aspirin 81 MG tablet Take 81 mg by mouth daily   • azelastine (ASTELIN) 0.1 % nasal spray 1 spray into each nostril 2 (two) times a day   • fluticasone (FLONASE) 50 mcg/act nasal spray 1 spray into each nostril daily   • losartan (COZAAR) 100 MG tablet Take 1 tablet (100 mg total) by mouth daily   • metoprolol succinate (Toprol XL) 25 mg 24 hr tablet Take 1 tablet (25 mg total) by mouth daily   • pravastatin (PRAVACHOL) 40 mg tablet Take 1 tablet (40 mg total) by mouth daily   • rOPINIRole (REQUIP) 2 mg tablet Take 1 tablet (2 mg total) by mouth daily at bedtime   • zolpidem (AMBIEN) 10 mg tablet Take 1 tablet (10 mg total) by mouth daily at bedtime as needed for sleep   • [DISCONTINUED] furosemide (LASIX) 20 mg tablet Take 1 tablet (20 mg total) by mouth daily as needed (swelling)   • ALPRAZolam (XANAX) 0.25 mg tablet TAKE 1 TABLET BY MOUTH ONCE DAILY AT BEDTIME AS NEEDED FOR ANXIETY OR SLEEP (Patient not taking: Reported on 7/14/2023)   • [DISCONTINUED] fluticasone (ARNUITY ELLIPTA) 100 MCG/ACT AEPB inhaler Inhale 1 puff daily Rinse mouth after use. (Patient not taking: Reported on 2023)       Objective     /72 (BP Location: Left arm, Patient Position: Sitting)   Pulse (!) 116   Temp 99 °F (37.2 °C)   Ht 5' 11" (1.803 m)   Wt 124 kg (274 lb 2 oz)   SpO2 97%   BMI 38.23 kg/m²     Physical Exam  Vitals and nursing note reviewed. Constitutional:       General: He is not in acute distress. Appearance: He is well-developed. He is not diaphoretic. HENT:      Head: Normocephalic and atraumatic. Right Ear: External ear normal.      Left Ear: External ear normal.      Nose: Nose normal.      Mouth/Throat:      Pharynx: No oropharyngeal exudate. Eyes:      General: No scleral icterus. Right eye: No discharge. Left eye: No discharge. Conjunctiva/sclera: Conjunctivae normal.      Pupils: Pupils are equal, round, and reactive to light. Neck:      Thyroid: No thyromegaly. Cardiovascular:      Rate and Rhythm: Normal rate and regular rhythm. Heart sounds: Normal heart sounds. No murmur heard. No friction rub. No gallop. Pulmonary:      Effort: Pulmonary effort is normal. No respiratory distress. Breath sounds: Examination of the right-upper field reveals wheezing. Examination of the left-upper field reveals wheezing. Examination of the right-middle field reveals wheezing. Examination of the left-middle field reveals wheezing. Examination of the right-lower field reveals wheezing. Examination of the left-lower field reveals wheezing. Wheezing present. No rales. Abdominal:      General: Bowel sounds are normal. There is no distension. Palpations: Abdomen is soft. Tenderness: There is no abdominal tenderness. Musculoskeletal:         General: No tenderness or deformity. Normal range of motion. Cervical back: Normal range of motion and neck supple. Right lower le+ Edema present. Left lower le+ Edema present.    Skin:     General: Skin is warm and dry. Neurological:      Mental Status: He is alert and oriented to person, place, and time. Cranial Nerves: No cranial nerve deficit. Psychiatric:         Behavior: Behavior normal.         Thought Content:  Thought content normal.         Judgment: Judgment normal.       Eva Smart PA-C

## 2023-07-24 ENCOUNTER — APPOINTMENT (OUTPATIENT)
Dept: LAB | Facility: CLINIC | Age: 73
End: 2023-07-24
Payer: MEDICARE

## 2023-07-24 ENCOUNTER — OFFICE VISIT (OUTPATIENT)
Dept: PULMONOLOGY | Facility: CLINIC | Age: 73
End: 2023-07-24
Payer: MEDICARE

## 2023-07-24 VITALS
HEART RATE: 104 BPM | RESPIRATION RATE: 17 BRPM | OXYGEN SATURATION: 95 % | SYSTOLIC BLOOD PRESSURE: 136 MMHG | HEIGHT: 71 IN | BODY MASS INDEX: 37.8 KG/M2 | DIASTOLIC BLOOD PRESSURE: 74 MMHG | WEIGHT: 270 LBS | TEMPERATURE: 98.3 F

## 2023-07-24 DIAGNOSIS — J31.0 CHRONIC RHINITIS: ICD-10-CM

## 2023-07-24 DIAGNOSIS — E08.40 DIABETES MELLITUS DUE TO UNDERLYING CONDITION WITH DIABETIC NEUROPATHY, UNSPECIFIED WHETHER LONG TERM INSULIN USE (HCC): ICD-10-CM

## 2023-07-24 DIAGNOSIS — J40 BRONCHITIS: ICD-10-CM

## 2023-07-24 DIAGNOSIS — I87.2 VENOUS STASIS ULCER OF RIGHT CALF WITH FAT LAYER EXPOSED WITHOUT VARICOSE VEINS (HCC): ICD-10-CM

## 2023-07-24 DIAGNOSIS — J30.2 SEASONAL ALLERGIES: ICD-10-CM

## 2023-07-24 DIAGNOSIS — J98.01 BRONCHOSPASM: Primary | ICD-10-CM

## 2023-07-24 DIAGNOSIS — L97.212 VENOUS STASIS ULCER OF RIGHT CALF WITH FAT LAYER EXPOSED WITHOUT VARICOSE VEINS (HCC): ICD-10-CM

## 2023-07-24 PROBLEM — J42 CHRONIC BRONCHITIS (HCC): Status: ACTIVE | Noted: 2023-02-19

## 2023-07-24 PROBLEM — J42 CHRONIC BRONCHITIS (HCC): Status: RESOLVED | Noted: 2023-02-19 | Resolved: 2023-07-24

## 2023-07-24 PROCEDURE — 86003 ALLG SPEC IGE CRUDE XTRC EA: CPT

## 2023-07-24 PROCEDURE — 99214 OFFICE O/P EST MOD 30 MIN: CPT

## 2023-07-24 PROCEDURE — 82785 ASSAY OF IGE: CPT

## 2023-07-24 PROCEDURE — 36415 COLL VENOUS BLD VENIPUNCTURE: CPT

## 2023-07-24 RX ORDER — FLUTICASONE FUROATE AND VILANTEROL 100; 25 UG/1; UG/1
1 POWDER RESPIRATORY (INHALATION) DAILY
Qty: 28 BLISTER | Refills: 0 | OUTPATIENT
Start: 2023-07-24 | End: 2023-08-23

## 2023-07-24 RX ORDER — FLUTICASONE PROPIONATE AND SALMETEROL 232; 14 UG/1; UG/1
1 POWDER, METERED RESPIRATORY (INHALATION) 2 TIMES DAILY
Qty: 1 EACH | Refills: 0 | Status: SHIPPED | OUTPATIENT
Start: 2023-07-24

## 2023-07-24 NOTE — PROGRESS NOTES
Pulmonary Follow Up Note  Brook Tafoya 74 y.o. male MRN: [id number]  7/24/2023    Assessment:    Chronic rhinitis  Patient will continue Flonase and Astelin nasal spray. Claritin daily. We will check [facility] allergy panel. If patient has significant allergens and elevated IgE, can consider starting on Singulair at bedtime. Bronchitis  S/p hospitalization for bronchospasm with history of bronchitis. He has had 3 rounds of steroids since January. will check PFTs and [facility] allergy panel to evaluate for possible underlying asthma versus COPD. I started patient on ICS/LABA maintenance inhaler. Air duo seemed to be the only inhaler that was covered by his insurance at this time. Sent into his pharmacy. I did provide Breo sample inhaler and instructed on use in the office today that he may use in the meantime. We will evaluate patient's symptoms and inhaler effectiveness at his next visit. Will also review PFT and allergy testing results. Plan:    Diagnoses and all orders for this visit:    Bronchospasm  -     fluticasone-salmeterol (AirDuo RespiClick 186/11) 629-48 mcg/act dry powder inhaler; Inhale 1 puff 2 (two) times a day Rinse mouth after use. -     Complete PFT with post bronchodilator; Future    Seasonal allergies  -     [facility] Allergy Panel, Adult; Future    Chronic rhinitis  -     [facility] Allergy Panel, Adult; Future    Bronchitis          Return in about 1 month (around 8/24/2023).       History of Present Illness     Chief Complaint:   Chief Complaint   Patient presents with   • Follow-up       Patient ID: Maurice Donnelly is a 74 y.o. y.o. male has a past medical history of Arthritis, Hyperlipidemia, Hypertension, Obesity, and Stage 3 chronic kidney disease, unspecified whether stage 3a or 3b CKD ([facility]) (6/28/2023).      7/24/2023  HPI: Brook Tafoya is a 74 y.o. male with a history significant for hypertension, bronchitis, PAD, CKD 3, seasonal allergies, morbid obesity, and hyperlipidemia who is here today for hospital follow-up visit. On 6/25/2023 he presented to the ED with shortness of breath and wheezing for 2 days. He had no relief with his rescue inhaler. He was admitted for observation and treated with nebulizers and steroids for acute bronchospasm. Discharged on steroid taper. Also started on fluticasone inhaler and albuterol as needed. Patient also had a similar episode in January of this year where he was admitted and treated for acute bronchitis and was discharged with recommendations to follow-up with pulmonary, however did not keep his appointment. Two weeks ago, patient presented to his PCP with complaints of wheezing and was prescribed a Medrol Dosepak. Patient states he did not have increased shortness of breath at that time. Symptoms improved with the steroids. He is not on a daily maintenance inhaler currently. Uses his albuterol rescue inhaler approximately twice per week. Patient does not report any shortness of breath, chest pain/chest tightness, or any nighttime symptoms. Patient has an occasional mucousy cough that is nonproductive, and chronic postnasal drainage. Denies any GERD symptoms. Denies snoring, however patient does feel sleepy during the day. Patient has chronic lower extremity swelling due to his PAD, however no increase in swelling recently. He is on chronic Lasix. He has no known personal or family history of lung disease. Patient has no pets in the home, however he states prior to his recent hospital admission, he was exposed to his daughter's dogs and cats when he went to visit, which he thinks flared up his symptoms. Has had no previous allergy testing. He takes Claritin daily as well as Astelin and Flonase nasal spray. He does have a chronic postnasal drip. He is currently retired, however spends a lot of time outdoors hunting, fishing, and gardening. 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 No known asbestosis exposure. Had a brief remote history of smoking when he was a teenager. States he smoked for approximately 5 years and then quit. Review of Systems   Constitutional: Negative for activity change, chills, fever and unexpected weight change. HENT: Negative for congestion, postnasal drip, rhinorrhea, sore throat and trouble swallowing. Respiratory: Positive for cough and wheezing. Negative for chest tightness and shortness of breath. Cardiovascular: Positive for leg swelling. Negative for chest pain and palpitations. Allergic/Immunologic: Negative. Historical Information   Past Medical History:   Diagnosis Date   • Arthritis    • Hyperlipidemia    • Hypertension    • Obesity    • Stage 3 chronic kidney disease, unspecified whether stage 3a or 3b CKD ([address]) 6/28/2023     Past Surgical History:   Procedure Laterality Date   • CARDIAC SURGERY      heart catherization   • CARPAL TUNNEL RELEASE Bilateral    • COLONOSCOPY     • ELBOW SURGERY Bilateral     implants in place   • FINGER SURGERY Left     pin in place - for arthritis   • GANGLION CYST EXCISION Left    • HAND SURGERY Bilateral    • KNEE SURGERY Left     with repair of meniscus     Family History   Problem Relation Age of Onset   • Rheum arthritis Mother    • Lupus Mother    • Heart disease Father    • Diabetes Sister    • Kidney disease Sister    • Heart disease Paternal Grandmother        Smoking history: He reports that he has quit smoking. His smoking use included cigarettes.  He has never used smokeless tobacco.    Occupational History:     Immunization History   Administered Date(s) Administered   • COVID-19 MODERNA VACC 0.5 ML IM 04/02/2021, 05/03/2021, 12/13/2021   • INFLUENZA 11/03/2016, 09/15/2017, 10/18/2019, 12/04/2019   • Influenza Split High Dose Preservative Free IM 10/18/2019   • Influenza, high dose seasonal 0.7 mL 12/18/2018, 09/14/2021   • Pneumococcal Conjugate 13-Valent 06/19/2018       Meds/Allergies Current Outpatient Medications:   •  albuterol (Ventolin HFA) 90 mcg/act inhaler, Inhale 2 puffs every 4 (four) hours as needed for wheezing or shortness of breath, Disp: 18 g, Rfl: 2  •  aspirin 81 MG tablet, Take 81 mg by mouth daily, Disp: , Rfl:   •  azelastine (ASTELIN) 0.1 % nasal spray, 1 spray into each nostril 2 (two) times a day, Disp: 30 mL, Rfl: 11  •  fluticasone (FLONASE) 50 mcg/act nasal spray, 1 spray into each nostril daily, Disp: , Rfl:   •  fluticasone-salmeterol (AirDuo RespiClick 405/80) 674-87 mcg/act dry powder inhaler, Inhale 1 puff 2 (two) times a day Rinse mouth after use., Disp: 1 each, Rfl: 0  •  furosemide (LASIX) 40 mg tablet, Take 1 tablet (40 mg total) by mouth daily as needed (swelling), Disp: 30 tablet, Rfl: 0  •  losartan (COZAAR) 100 MG tablet, Take 1 tablet (100 mg total) by mouth daily, Disp: 30 tablet, Rfl: 5  •  metoprolol succinate (Toprol XL) 25 mg 24 hr tablet, Take 1 tablet (25 mg total) by mouth daily, Disp: 30 tablet, Rfl: 5  •  pravastatin (PRAVACHOL) 40 mg tablet, Take 1 tablet (40 mg total) by mouth daily, Disp: 90 tablet, Rfl: 1  •  zolpidem (AMBIEN) 10 mg tablet, Take 1 tablet (10 mg total) by mouth daily at bedtime as needed for sleep, Disp: 30 tablet, Rfl: 5  •  ALPRAZolam (XANAX) 0.25 mg tablet, TAKE 1 TABLET BY MOUTH ONCE DAILY AT BEDTIME AS NEEDED FOR ANXIETY OR SLEEP (Patient not taking: Reported on 7/14/2023), Disp: 30 tablet, Rfl: 0  •  methylPREDNISolone 4 MG tablet therapy pack, Use as directed on package (Patient not taking: Reported on 7/24/2023), Disp: 21 each, Rfl: 0  •  rOPINIRole (REQUIP) 2 mg tablet, Take 1 tablet (2 mg total) by mouth daily at bedtime (Patient not taking: Reported on 7/24/2023), Disp: 30 tablet, Rfl: 5  Allergies:    Allergies   Allergen Reactions   • Other      Summertime grass, weeds         Vitals:  Vitals:    07/24/23 0753   BP: 136/74   Pulse: 104   Resp: 17   Temp: 98.3 °F (36.8 °C)   SpO2: 95%   Weight: 122 kg (270 lb) Height: 5' 11" (1.803 m)   Oxygen Therapy  SpO2: 95 %  . Wt Readings from Last 3 Encounters:   23 122 kg (270 lb)   23 124 kg (274 lb 2 oz)   23 128 kg (282 lb 6 oz)     Body mass index is 37.66 kg/m². Physical Exam  Vitals and nursing note reviewed. Constitutional:       General: He is not in acute distress. Appearance: Normal appearance. He is well-developed. He is obese. Cardiovascular:      Rate and Rhythm: Normal rate and regular rhythm. Heart sounds: Normal heart sounds. No murmur heard. Pulmonary:      Effort: Pulmonary effort is normal. No respiratory distress. Breath sounds: Examination of the right-upper field reveals wheezing. Examination of the left-upper field reveals wheezing. Wheezing present. No decreased breath sounds, rhonchi or rales. Comments: Faint b/l upper lobe expiratory wheezing. Improved with cough. Musculoskeletal:         General: No swelling. Right lower le+ Edema present. Left lower le+ Edema present. Psychiatric:         Mood and Affect: Mood and affect normal.         Behavior: Behavior normal. Behavior is cooperative. Labs: I have personally reviewed pertinent lab results.   Lab Results   Component Value Date    WBC 8.75 2023    HGB 13.4 2023    HCT 41.9 2023    MCV 97 2023     2023     Lab Results   Component Value Date    CALCIUM 8.6 2023    K 3.8 2023    CO2 29 2023     2023    BUN 19 2023    CREATININE 1.20 2023     No results found for: "IGE"  Lab Results   Component Value Date    ALT 28 2023    AST 21 2023    ALKPHOS 42 2023       Component      Latest Ref Rng 2018   Absolute Eosinophils      0.00 - 0.61 Thousand/µL 0.23  0.19  0.39      Component      Latest Ref Rng 3/9/2020 8/10/2020 2022   Absolute Eosinophils      0.00 - 0.61 Thousand/µL 0.20  0.13  0.31      Component Latest Ref Rng 1/24/2023 1/28/2023 1/29/2023   Absolute Eosinophils      0.00 - 0.61 Thousand/µL 0.27  0.38  0.55      Component      Latest Ref Rng 1/30/2023 6/25/2023   Absolute Eosinophils      0.00 - 0.61 Thousand/µL 0.00  0.54          Imaging and other studies: I have personally reviewed pertinent reports and I have personally reviewed pertinent films in PACS.     Chest x-ray 6/25/2023  No acute cardiopulmonary disease    CTA chest PE study 1/29/2023  Bilateral lower lobe bronchial wall thickening, suggestive of bronchitis    Pulmonary function testing: none prior for review

## 2023-07-24 NOTE — ASSESSMENT & PLAN NOTE
Patient will continue Flonase and Astelin nasal spray. Claritin daily. We will check [facility] allergy panel. If patient has significant allergens and elevated IgE, can consider starting on Singulair at bedtime.

## 2023-07-24 NOTE — ASSESSMENT & PLAN NOTE
S/p hospitalization for bronchospasm with history of bronchitis. He has had 3 rounds of steroids since January. will check PFTs and [facility] allergy panel to evaluate for possible underlying asthma versus COPD. I started patient on ICS/LABA maintenance inhaler. Air duo seemed to be the only inhaler that was covered by his insurance at this time. Sent into his pharmacy. I did provide Breo sample inhaler and instructed on use in the office today that he may use in the meantime. We will evaluate patient's symptoms and inhaler effectiveness at his next visit. Will also review PFT and allergy testing results.

## 2023-07-27 LAB
A ALTERNATA IGE QN: <0.1 KUA/I
A FUMIGATUS IGE QN: <0.1 KUA/I
BERMUDA GRASS IGE QN: 0.64 KUA/I
BOXELDER IGE QN: <0.1 KUA/I
C HERBARUM IGE QN: <0.1 KUA/I
CAT DANDER IGE QN: 7.26 KUA/I
CMN PIGWEED IGE QN: <0.1 KUA/I
COMMON RAGWEED IGE QN: 1 KUA/I
COTTONWOOD IGE QN: <0.1 KUA/I
D FARINAE IGE QN: 2.79 KUA/I
D PTERONYSS IGE QN: 2.58 KUA/I
DOG DANDER IGE QN: 2.43 KUA/I
LONDON PLANE IGE QN: <0.1 KUA/I
MOUSE URINE PROT IGE QN: <0.1 KUA/I
MT JUNIPER IGE QN: <0.1 KUA/I
MUGWORT IGE QN: <0.1 KUA/I
P NOTATUM IGE QN: <0.1 KUA/I
ROACH IGE QN: <0.1 KUA/I
SHEEP SORREL IGE QN: <0.1 KUA/I
SILVER BIRCH IGE QN: 0.52 KUA/I
TIMOTHY IGE QN: 6.56 KUA/I
TOTAL IGE SMQN RAST: 181 KU/L (ref 0–113)
WALNUT IGE QN: <0.1 KUA/I
WHITE ASH IGE QN: <0.1 KUA/I
WHITE ELM IGE QN: <0.1 KUA/I
WHITE MULBERRY IGE QN: <0.1 KUA/I
WHITE OAK IGE QN: <0.1 KUA/I

## 2023-07-28 ENCOUNTER — HOSPITAL ENCOUNTER (OUTPATIENT)
Dept: PULMONOLOGY | Facility: HOSPITAL | Age: 73
End: 2023-07-28
Payer: MEDICARE

## 2023-07-28 DIAGNOSIS — J98.01 BRONCHOSPASM: ICD-10-CM

## 2023-07-28 PROCEDURE — 94729 DIFFUSING CAPACITY: CPT

## 2023-07-28 PROCEDURE — 94726 PLETHYSMOGRAPHY LUNG VOLUMES: CPT

## 2023-07-28 PROCEDURE — 94726 PLETHYSMOGRAPHY LUNG VOLUMES: CPT | Performed by: INTERNAL MEDICINE

## 2023-07-28 PROCEDURE — 94060 EVALUATION OF WHEEZING: CPT | Performed by: INTERNAL MEDICINE

## 2023-07-28 PROCEDURE — 94760 N-INVAS EAR/PLS OXIMETRY 1: CPT

## 2023-07-28 PROCEDURE — 94060 EVALUATION OF WHEEZING: CPT

## 2023-07-28 PROCEDURE — 94729 DIFFUSING CAPACITY: CPT | Performed by: INTERNAL MEDICINE

## 2023-07-28 RX ORDER — ALBUTEROL SULFATE 2.5 MG/3ML
2.5 SOLUTION RESPIRATORY (INHALATION) ONCE AS NEEDED
Status: COMPLETED | OUTPATIENT
Start: 2023-07-28 | End: 2023-07-28

## 2023-07-28 RX ADMIN — ALBUTEROL SULFATE 2.5 MG: 2.5 SOLUTION RESPIRATORY (INHALATION) at 08:21

## 2023-08-15 ENCOUNTER — OFFICE VISIT (OUTPATIENT)
Dept: INTERNAL MEDICINE CLINIC | Facility: CLINIC | Age: 73
End: 2023-08-15
Payer: MEDICARE

## 2023-08-15 VITALS
OXYGEN SATURATION: 93 % | TEMPERATURE: 98.1 F | HEART RATE: 101 BPM | DIASTOLIC BLOOD PRESSURE: 72 MMHG | BODY MASS INDEX: 38.78 KG/M2 | SYSTOLIC BLOOD PRESSURE: 128 MMHG | HEIGHT: 71 IN | WEIGHT: 277 LBS

## 2023-08-15 DIAGNOSIS — G47.09 OTHER INSOMNIA: ICD-10-CM

## 2023-08-15 DIAGNOSIS — F41.8 ANXIETY ABOUT HEALTH: ICD-10-CM

## 2023-08-15 DIAGNOSIS — I10 PRIMARY HYPERTENSION: ICD-10-CM

## 2023-08-15 DIAGNOSIS — J98.01 BRONCHOSPASM: Primary | ICD-10-CM

## 2023-08-15 PROCEDURE — 99214 OFFICE O/P EST MOD 30 MIN: CPT | Performed by: PHYSICIAN ASSISTANT

## 2023-08-15 RX ORDER — FUROSEMIDE 40 MG/1
40 TABLET ORAL DAILY PRN
Qty: 30 TABLET | Refills: 0 | Status: SHIPPED | OUTPATIENT
Start: 2023-08-15

## 2023-08-15 RX ORDER — FLUTICASONE FUROATE AND VILANTEROL 200; 25 UG/1; UG/1
1 POWDER RESPIRATORY (INHALATION) DAILY
Qty: 60 BLISTER | Refills: 0 | Status: SHIPPED | OUTPATIENT
Start: 2023-08-15 | End: 2023-08-21

## 2023-08-15 RX ORDER — ALPRAZOLAM 0.5 MG/1
0.5 TABLET ORAL 2 TIMES DAILY PRN
Qty: 60 TABLET | Refills: 1 | Status: SHIPPED | OUTPATIENT
Start: 2023-08-15

## 2023-08-15 NOTE — PROGRESS NOTES
Name: Charu Guadarrama      :       MRN: [id number]  Encounter Provider: Rene Gonzalez PA-C  Encounter Date: 8/15/2023   Encounter department: [address] [address] [place]     1. Bronchospasm  Assessment & Plan:  Continue breo daily and albuterol prn. Previously tried and failed atrovent, xopenex, duoneb, arnuity and airduo     Orders:  -     fluticasone-vilanterol (Breo Ellipta) 200-25 mcg/actuation inhaler; Inhale 1 puff daily Rinse mouth after use. 2. Other insomnia  -     ALPRAZolam (XANAX) 0.5 mg tablet; Take 1 tablet (0.5 mg total) by mouth 2 (two) times a day as needed for anxiety    3. Anxiety about health  Assessment & Plan:  Xanax refilled. Patient was informed that this medicine has an abuse potential and may be habit forming. We discussed that this may also cause drowsiness. The medication should not be combined with alcohol. The patient was informed not to operate machinery while taking this medication and should not drive until they know how they respond to the medication. The patient verbalized understanding of this. Orders:  -     ALPRAZolam (XANAX) 0.5 mg tablet; Take 1 tablet (0.5 mg total) by mouth 2 (two) times a day as needed for anxiety    4. Primary hypertension  -     furosemide (LASIX) 40 mg tablet; Take 1 tablet (40 mg total) by mouth daily as needed (swelling)           Subjective      Pt presents for routine visit. He is doing better overall. He is noting that he had PFTs and saw pulmonary. He was given Breo with great results. He is noting intermittent anxiety and desires a refill of his xanax. Leg swelling is overall improved. He is up to date with AWV, CRC screening and labs. Review of Systems   Constitutional: Negative for chills and fever. HENT: Negative for congestion, ear pain, hearing loss, postnasal drip, rhinorrhea, sinus pressure, sinus pain, sore throat and trouble swallowing.     Eyes: Negative for pain and visual disturbance. Respiratory: Positive for cough. Negative for chest tightness, shortness of breath and wheezing. Cardiovascular: Negative. Negative for chest pain, palpitations and leg swelling. Gastrointestinal: Negative for abdominal pain, blood in stool, constipation, diarrhea, nausea and vomiting. Endocrine: Negative for cold intolerance, heat intolerance, polydipsia, polyphagia and polyuria. Genitourinary: Negative for difficulty urinating, dysuria, flank pain and urgency. Musculoskeletal: Negative for arthralgias, back pain, gait problem and myalgias. Skin: Negative for rash. Allergic/Immunologic: Negative. Neurological: Negative for dizziness, weakness, light-headedness and headaches. Hematological: Negative. Psychiatric/Behavioral: Negative for behavioral problems, dysphoric mood and sleep disturbance. The patient is not nervous/anxious. Current Outpatient Medications on File Prior to Visit   Medication Sig   • albuterol (Ventolin HFA) 90 mcg/act inhaler Inhale 2 puffs every 4 (four) hours as needed for wheezing or shortness of breath   • aspirin 81 MG tablet Take 81 mg by mouth daily   • azelastine (ASTELIN) 0.1 % nasal spray 1 spray into each nostril 2 (two) times a day   • fluticasone (FLONASE) 50 mcg/act nasal spray 1 spray into each nostril daily   • fluticasone-salmeterol (AirDuo RespiClick 936/43) 136-52 mcg/act dry powder inhaler Inhale 1 puff 2 (two) times a day Rinse mouth after use.    • losartan (COZAAR) 100 MG tablet Take 1 tablet (100 mg total) by mouth daily   • metoprolol succinate (Toprol XL) 25 mg 24 hr tablet Take 1 tablet (25 mg total) by mouth daily   • pravastatin (PRAVACHOL) 40 mg tablet Take 1 tablet (40 mg total) by mouth daily   • rOPINIRole (REQUIP) 2 mg tablet Take 1 tablet (2 mg total) by mouth daily at bedtime   • zolpidem (AMBIEN) 10 mg tablet Take 1 tablet (10 mg total) by mouth daily at bedtime as needed for sleep   • [DISCONTINUED] ALPRAZolam (XANAX) 0.25 mg tablet TAKE 1 TABLET BY MOUTH ONCE DAILY AT BEDTIME AS NEEDED FOR ANXIETY OR SLEEP   • [DISCONTINUED] furosemide (LASIX) 40 mg tablet Take 1 tablet (40 mg total) by mouth daily as needed (swelling)   • [DISCONTINUED] methylPREDNISolone 4 MG tablet therapy pack Use as directed on package (Patient not taking: Reported on 7/24/2023)       Objective     /72 (BP Location: Left arm, Patient Position: Sitting)   Pulse 101   Temp 98.1 °F (36.7 °C)   Ht 5' 11" (1.803 m)   Wt 126 kg (277 lb)   SpO2 93%   BMI 38.63 kg/m²     Physical Exam  Vitals and nursing note reviewed. Constitutional:       General: He is not in acute distress. Appearance: He is well-developed. He is not diaphoretic. HENT:      Head: Normocephalic and atraumatic. Right Ear: External ear normal.      Left Ear: External ear normal.      Nose: Nose normal.      Mouth/Throat:      Pharynx: No oropharyngeal exudate. Eyes:      General: No scleral icterus. Right eye: No discharge. Left eye: No discharge. Conjunctiva/sclera: Conjunctivae normal.      Pupils: Pupils are equal, round, and reactive to light. Neck:      Thyroid: No thyromegaly. Cardiovascular:      Rate and Rhythm: Normal rate and regular rhythm. Heart sounds: Normal heart sounds. No murmur heard. No friction rub. No gallop. Pulmonary:      Effort: Pulmonary effort is normal. No respiratory distress. Breath sounds: Normal breath sounds. No wheezing or rales. Abdominal:      General: Bowel sounds are normal. There is no distension. Palpations: Abdomen is soft. Tenderness: There is no abdominal tenderness. Musculoskeletal:         General: No tenderness or deformity. Normal range of motion. Cervical back: Normal range of motion and neck supple. Skin:     General: Skin is warm and dry. Neurological:      Mental Status: He is alert and oriented to person, place, and time. Cranial Nerves:  No cranial nerve deficit. Psychiatric:         Behavior: Behavior normal.         Thought Content:  Thought content normal.         Judgment: Judgment normal.       Eva Smart PA-C

## 2023-08-15 NOTE — ASSESSMENT & PLAN NOTE
Continue breo daily and albuterol prn.  Previously tried and failed atrovent, xopenex, duoneb, arnuity and airduo

## 2023-08-15 NOTE — ASSESSMENT & PLAN NOTE
Xanax refilled. Patient was informed that this medicine has an abuse potential and may be habit forming. We discussed that this may also cause drowsiness. The medication should not be combined with alcohol. The patient was informed not to operate machinery while taking this medication and should not drive until they know how they respond to the medication. The patient verbalized understanding of this.

## 2023-08-16 ENCOUNTER — OFFICE VISIT (OUTPATIENT)
Dept: PODIATRY | Facility: CLINIC | Age: 73
End: 2023-08-16
Payer: MEDICARE

## 2023-08-16 VITALS
HEIGHT: 71 IN | DIASTOLIC BLOOD PRESSURE: 66 MMHG | HEART RATE: 111 BPM | SYSTOLIC BLOOD PRESSURE: 133 MMHG | WEIGHT: 277 LBS | BODY MASS INDEX: 38.78 KG/M2

## 2023-08-16 DIAGNOSIS — G62.9 NEUROPATHY: Primary | ICD-10-CM

## 2023-08-16 DIAGNOSIS — L84 CALLUS: ICD-10-CM

## 2023-08-16 DIAGNOSIS — B35.1 ONYCHOMYCOSIS: ICD-10-CM

## 2023-08-16 PROCEDURE — 11721 DEBRIDE NAIL 6 OR MORE: CPT | Performed by: PODIATRIST

## 2023-08-16 PROCEDURE — 11056 PARNG/CUTG B9 HYPRKR LES 2-4: CPT | Performed by: PODIATRIST

## 2023-08-16 NOTE — PROGRESS NOTES
[facility]  1950  AT RISK FOOT CARE    1. Neuropathy        2. Onychomycosis        3. Callus            Patient presents for at-risk foot care. Patient has no acute concerns today. Patient has significant lower extremity risk due to diminished pulses in the feet and trophic skin changes to the lower extremity including thick toenail, atrophic skin, and decreased hair growth. On exam patient has thickened, hypertrophic, discolored, brittle toenails with subungual debris and tenderness x10   Callus: 4  Patient has diminished pedal pulses and decreased perfusion to the lower extremities  Patient has significant trophic changes to the skin including thick toenails, decreased pedal hair and atrophic skin. Today's treatment includes:  Debridement of toenails. Using nail nipper, jerica, and curette, nails were sharply debrided, reduced in thickness and length. Devitalized nail tissue and fungal debris excised and removed. Patient tolerated well. Discussed proper shoe gear, daily inspections of feet, and general foot health with patient. Patient has Q8  findings and is recommended for at risk foot care every 9-10 weeks. Procedure: All mycotic toenails were reduced and debrided in length, width, and girth using a nail nipper and dremel. All hyperkeratotic skin lesion(s) were sharply pared with a scalpel with no bleeding or evidence of ulceration. Patient tolerated procedure(s) well without complications.

## 2023-08-18 ENCOUNTER — TELEPHONE (OUTPATIENT)
Dept: INTERNAL MEDICINE CLINIC | Facility: CLINIC | Age: 73
End: 2023-08-18

## 2023-08-21 ENCOUNTER — OFFICE VISIT (OUTPATIENT)
Dept: PULMONOLOGY | Facility: CLINIC | Age: 73
End: 2023-08-21
Payer: MEDICARE

## 2023-08-21 VITALS
DIASTOLIC BLOOD PRESSURE: 70 MMHG | TEMPERATURE: 97.8 F | HEART RATE: 112 BPM | SYSTOLIC BLOOD PRESSURE: 118 MMHG | RESPIRATION RATE: 16 BRPM | WEIGHT: 278.2 LBS | HEIGHT: 71 IN | OXYGEN SATURATION: 93 % | BODY MASS INDEX: 38.95 KG/M2

## 2023-08-21 DIAGNOSIS — J30.89 ENVIRONMENTAL AND SEASONAL ALLERGIES: ICD-10-CM

## 2023-08-21 DIAGNOSIS — J45.909 REACTIVE AIRWAY DISEASE WITHOUT COMPLICATION, UNSPECIFIED ASTHMA SEVERITY, UNSPECIFIED WHETHER PERSISTENT: Primary | ICD-10-CM

## 2023-08-21 DIAGNOSIS — E66.09 CLASS 2 OBESITY DUE TO EXCESS CALORIES WITH BODY MASS INDEX (BMI) OF 38.0 TO 38.9 IN ADULT, UNSPECIFIED WHETHER SERIOUS COMORBIDITY PRESENT: ICD-10-CM

## 2023-08-21 PROBLEM — E66.812 CLASS 2 OBESITY DUE TO EXCESS CALORIES WITH BODY MASS INDEX (BMI) OF 38.0 TO 38.9 IN ADULT: Status: ACTIVE | Noted: 2021-03-08

## 2023-08-21 PROCEDURE — 99214 OFFICE O/P EST MOD 30 MIN: CPT

## 2023-08-21 RX ORDER — MONTELUKAST SODIUM 10 MG/1
10 TABLET ORAL
Qty: 30 TABLET | Refills: 3 | Status: SHIPPED | OUTPATIENT
Start: 2023-08-21

## 2023-08-21 RX ORDER — FLUTICASONE PROPIONATE AND SALMETEROL 232; 14 UG/1; UG/1
1 POWDER, METERED RESPIRATORY (INHALATION) 2 TIMES DAILY
Qty: 1 EACH | Refills: 3 | Status: SHIPPED | OUTPATIENT
Start: 2023-08-21

## 2023-08-21 NOTE — ASSESSMENT & PLAN NOTE
Patient had evidence of restriction on recent PFTs. Normal diffusion capacity. This is likely related to his obesity. I have explained this to the patient and have encouraged him to lose some weight. Patient also at high risk for JENAE given his elevated BMI and large neck. Does not know if he snores at night. Denies any excessive daytime sleepiness. I offered diagnostic sleep study, however he declined at this time.

## 2023-08-21 NOTE — ASSESSMENT & PLAN NOTE
Reviewed [facility] allergy panel with patient today. He has significant allergies to dogs, cats, dust mites, and grass with an elevated IgE of 181. Patient not finding benefit from Claritin. Recommend he start on singular 10 mg daily at bedtime. Continue Astelin nasal spray. Avoid known allergens when able.

## 2023-08-21 NOTE — ASSESSMENT & PLAN NOTE
Reviewed recent PFTs with patient today. He has no evidence of obstruction on spirometry and no significant BD response. Explained to the patient he does not have COPD or evidence of asthma. Most likely has reactive airway disease which caused his prior hospitalization for acute bronchospasm following exposure to his daughter's dogs and cats. [facility] allergy panel revealed multiple allergies including dogs, cats, grass, and dust mites. He continues with daily wheezing and dry cough. Has not been taking a daily maintenance inhaler. Recommend patient start using air duo that was prescribed at his last office visit, 1 puff twice per day. Reminded him to rinse mouth after. Continue to use albuterol rescue inhaler every 6 hours as needed for shortness of breath or wheezing. Continue allergy treatment as above. Once symptoms stabilize, wean air duo.

## 2023-08-21 NOTE — PROGRESS NOTES
Pulmonary Follow Up Note  Addison Pedroza 74 y.o. male MRN: [id number]  8/21/2023    Assessment:    Seasonal allergies  Reviewed [facility] allergy panel with patient today. He has significant allergies to dogs, cats, dust mites, and grass with an elevated IgE of 181. Patient not finding benefit from Claritin. Recommend he start on singular 10 mg daily at bedtime. Continue Astelin nasal spray. Avoid known allergens when able. Bronchospasm  Reviewed recent PFTs with patient today. He has no evidence of obstruction on spirometry and no significant BD response. Explained to the patient he does not have COPD or evidence of asthma. Most likely has reactive airway disease which caused his prior hospitalization for acute bronchospasm following exposure to his daughter's dogs and cats. [facility] allergy panel revealed multiple allergies including dogs, cats, grass, and dust mites. He continues with daily wheezing and dry cough. Has not been taking a daily maintenance inhaler. Recommend patient start using air duo that was prescribed at his last office visit, 1 puff twice per day. Reminded him to rinse mouth after. Continue to use albuterol rescue inhaler every 6 hours as needed for shortness of breath or wheezing. Continue allergy treatment as above. Once symptoms stabilize, wean air duo. Class 2 obesity due to excess calories with body mass index (BMI) of 38.0 to 38.9 in adult  Patient had evidence of restriction on recent PFTs. Normal diffusion capacity. This is likely related to his obesity. I have explained this to the patient and have encouraged him to lose some weight. Patient also at high risk for JENAE given his elevated BMI and large neck. Does not know if he snores at night. Denies any excessive daytime sleepiness. I offered diagnostic sleep study, however he declined at this time.         Plan:    Diagnoses and all orders for this visit:    Environmental and seasonal allergies  - montelukast (SINGULAIR) 10 mg tablet; Take 1 tablet (10 mg total) by mouth daily at bedtime    Reactive airway disease without complication, unspecified asthma severity, unspecified whether persistent  -     fluticasone-salmeterol (AirDuo RespiClick 022/37) 626-91 mcg/act dry powder inhaler; Inhale 1 puff 2 (two) times a day Rinse mouth after use. Class 2 obesity due to excess calories with body mass index (BMI) of 38.0 to 38.9 in adult, unspecified whether serious comorbidity present          Return in about 3 months (around 11/21/2023). History of Present Illness     Chief Complaint:   Chief Complaint   Patient presents with   • Follow-up       Patient ID: Nery Quigley is a 74 y.o. y.o. male has a past medical history of Arthritis, Hyperlipidemia, Hypertension, Obesity, and Stage 3 chronic kidney disease, unspecified whether stage 3a or 3b CKD ([address]) (6/28/2023).      8/21/2023  HPI: Meka Salazar is a 74 y.o. male with a history significant for environmental and seasonal allergies, bronchitis, hypertension, PAD, CKD 3, obesity, and hyperlipidemia who is here today for a follow-up visit. He was previously seen in the office 1 month ago following a hospitalization for acute bronchospasm. He was sent for [place] allergy testing and pulmonary function test.  There was question whether or not he had underlying allergies with suspected exposure to dogs and cats triggering his possible hospitalization. Patient states he continues with a dry cough and wheezing during the day. Using his albuterol inhaler few times per week. Denies any nighttime symptoms regards to his breathing, however, he states he does not sleep well due to difficulty falling asleep. He denies any snoring, or excessive daytime sleepiness. Patient has not been on a daily maintenance inhaler since his last office visit, stating he did not know why it was prescribed.   He was on Breo previously and had significant improvement of his symptoms, however not covered by his insurance. He denies any chest pain/chest tightness, or sputum production. He has chronic lower extremity swelling, and denies any increase. Review of Systems   Constitutional: Negative for activity change, chills, fever and unexpected weight change. HENT: Negative for congestion, postnasal drip, rhinorrhea, sore throat and trouble swallowing. Respiratory: Positive for cough and wheezing. Negative for chest tightness and shortness of breath. Cardiovascular: Positive for leg swelling. Negative for chest pain and palpitations. Allergic/Immunologic: Positive for environmental allergies. Psychiatric/Behavioral: Positive for sleep disturbance. Historical Information   Past Medical History:   Diagnosis Date   • Arthritis    • Hyperlipidemia    • Hypertension    • Obesity    • Stage 3 chronic kidney disease, unspecified whether stage 3a or 3b CKD ([address]) 6/28/2023     Past Surgical History:   Procedure Laterality Date   • CARDIAC SURGERY      heart catherization   • CARPAL TUNNEL RELEASE Bilateral    • COLONOSCOPY     • ELBOW SURGERY Bilateral     implants in place   • FINGER SURGERY Left     pin in place - for arthritis   • GANGLION CYST EXCISION Left    • HAND SURGERY Bilateral    • KNEE SURGERY Left     with repair of meniscus     Family History   Problem Relation Age of Onset   • Rheum arthritis Mother    • Lupus Mother    • Heart disease Father    • Diabetes Sister    • Kidney disease Sister    • Heart disease Paternal Grandmother        Smoking history: He reports that he has quit smoking. His smoking use included cigarettes.  He has never used smokeless tobacco.    Occupational History:     Immunization History   Administered Date(s) Administered   • COVID-19 MODERNA VACC 0.5 ML IM 04/02/2021, 05/03/2021, 12/13/2021   • INFLUENZA 11/03/2016, 09/15/2017, 10/18/2019, 12/04/2019   • Influenza Split High Dose Preservative Free IM 10/18/2019   • Influenza, high dose seasonal 0.7 mL 12/18/2018, 09/14/2021   • Pneumococcal Conjugate 13-Valent 06/19/2018       Meds/Allergies     Current Outpatient Medications:   •  albuterol (Ventolin HFA) 90 mcg/act inhaler, Inhale 2 puffs every 4 (four) hours as needed for wheezing or shortness of breath, Disp: 18 g, Rfl: 2  •  ALPRAZolam (XANAX) 0.5 mg tablet, Take 1 tablet (0.5 mg total) by mouth 2 (two) times a day as needed for anxiety, Disp: 60 tablet, Rfl: 1  •  aspirin 81 MG tablet, Take 81 mg by mouth daily, Disp: , Rfl:   •  azelastine (ASTELIN) 0.1 % nasal spray, 1 spray into each nostril 2 (two) times a day, Disp: 30 mL, Rfl: 11  •  fluticasone (FLONASE) 50 mcg/act nasal spray, 1 spray into each nostril daily, Disp: , Rfl:   •  fluticasone-salmeterol (AirDuo RespiClick 957/21) 478-91 mcg/act dry powder inhaler, Inhale 1 puff 2 (two) times a day Rinse mouth after use., Disp: 1 each, Rfl: 3  •  furosemide (LASIX) 40 mg tablet, Take 1 tablet (40 mg total) by mouth daily as needed (swelling), Disp: 30 tablet, Rfl: 0  •  losartan (COZAAR) 100 MG tablet, Take 1 tablet (100 mg total) by mouth daily, Disp: 30 tablet, Rfl: 5  •  metoprolol succinate (Toprol XL) 25 mg 24 hr tablet, Take 1 tablet (25 mg total) by mouth daily, Disp: 30 tablet, Rfl: 5  •  montelukast (SINGULAIR) 10 mg tablet, Take 1 tablet (10 mg total) by mouth daily at bedtime, Disp: 30 tablet, Rfl: 3  •  pravastatin (PRAVACHOL) 40 mg tablet, Take 1 tablet (40 mg total) by mouth daily, Disp: 90 tablet, Rfl: 1  •  rOPINIRole (REQUIP) 2 mg tablet, Take 1 tablet (2 mg total) by mouth daily at bedtime, Disp: 30 tablet, Rfl: 5  •  zolpidem (AMBIEN) 10 mg tablet, Take 1 tablet (10 mg total) by mouth daily at bedtime as needed for sleep, Disp: 30 tablet, Rfl: 5  Allergies:    Allergies   Allergen Reactions   • Other      Summertime grass, weeds         Vitals:  Vitals:    08/21/23 0826   BP: 118/70   BP Location: Left arm   Patient Position: Sitting   Cuff Size: Large   Pulse: (!) 112 Resp: 16   Temp: 97.8 °F (36.6 °C)   TempSrc: Temporal   SpO2: 93%   Weight: 126 kg (278 lb 3.2 oz)   Height: 5' 11" (1.803 m)   Oxygen Therapy  SpO2: 93 %  . Wt Readings from Last 3 Encounters:   23 126 kg (278 lb 3.2 oz)   23 126 kg (277 lb)   08/15/23 126 kg (277 lb)     Body mass index is 38.8 kg/m². Physical Exam  Vitals and nursing note reviewed. Constitutional:       General: He is not in acute distress. Appearance: Normal appearance. He is well-developed. He is obese. Cardiovascular:      Rate and Rhythm: Normal rate and regular rhythm. Heart sounds: Normal heart sounds. No murmur heard. Pulmonary:      Effort: Pulmonary effort is normal. No respiratory distress. Breath sounds: Normal breath sounds. No decreased breath sounds, wheezing, rhonchi or rales. Musculoskeletal:         General: No swelling. Right lower le+ Edema present. Left lower le+ Edema present. Psychiatric:         Mood and Affect: Mood and affect normal.         Behavior: Behavior normal. Behavior is cooperative. Labs: I have personally reviewed pertinent lab results. Lab Results   Component Value Date    WBC 8.75 2023    HGB 13.4 2023    HCT 41.9 2023    MCV 97 2023     2023     Lab Results   Component Value Date    CALCIUM 8.6 2023    K 3.8 2023    CO2 29 2023     2023    BUN 19 2023    CREATININE 1.20 2023     Lab Results   Component Value Date     (H) 2023     Lab Results   Component Value Date    ALT 28 2023    AST 21 2023    ALKPHOS 42 2023       Imaging and other studies: I have personally reviewed pertinent reports.       Pulmonary function testing:     Pulmonary Functions Testing Results: 23    FEV1/FVC ratio 73%    FEV1 57% predicted  FVC 58% predicted  (-) response to bronchodilators  TLC 70 % predicted  RV 99 % predicted  DLCO corrected for hemoglobin 83 % predicted    Impression: Reduced total lung capacity at 70% predicted with a relatively normal residual volume. Restrictive pattern on spirometry. No significant improvement in airflow or vital capacity following the administration of bronchodilators. Normal corrected diffusion capacity. Normal airway resistance as indicated by the specific airway conductance.

## 2023-08-29 ENCOUNTER — OFFICE VISIT (OUTPATIENT)
Dept: INTERNAL MEDICINE CLINIC | Facility: CLINIC | Age: 73
End: 2023-08-29
Payer: MEDICARE

## 2023-08-29 VITALS
WEIGHT: 279.25 LBS | OXYGEN SATURATION: 94 % | HEIGHT: 71 IN | DIASTOLIC BLOOD PRESSURE: 68 MMHG | TEMPERATURE: 99.2 F | HEART RATE: 70 BPM | SYSTOLIC BLOOD PRESSURE: 128 MMHG | BODY MASS INDEX: 39.1 KG/M2

## 2023-08-29 DIAGNOSIS — I10 PRIMARY HYPERTENSION: ICD-10-CM

## 2023-08-29 DIAGNOSIS — R60.0 LOCALIZED EDEMA: Primary | ICD-10-CM

## 2023-08-29 PROCEDURE — 99213 OFFICE O/P EST LOW 20 MIN: CPT | Performed by: PHYSICIAN ASSISTANT

## 2023-08-29 RX ORDER — CEPHALEXIN 500 MG/1
500 CAPSULE ORAL 2 TIMES DAILY
Qty: 14 CAPSULE | Refills: 0 | Status: SHIPPED | OUTPATIENT
Start: 2023-08-29 | End: 2023-09-05

## 2023-08-29 NOTE — PROGRESS NOTES
Name: Valentino Hsieh      :       MRN: [id number]  Encounter Provider: Arthur Garnica PA-C  Encounter Date: 2023   Encounter department: [id number] [place] [address] [place]     1. Localized edema   Assessment & Plan: Will have pt increase lasix to 40mg twice a day for the next week. Will also start keflex to avoid any cellulitis. Call if symptoms fail to improve. Orders:  -     cephalexin (KEFLEX) 500 mg capsule; Take 1 capsule (500 mg total) by mouth 2 (two) times a day for 7 days    2. Primary hypertension           Subjective      Pt presents for evaluation of weeping fluid from his right leg and foot. He has a hx of bilateral lower extremity edema that had responded well with lasix use. He notes he saw podiatry and had callus pared away with a scalpel and since then his right foot has been consistently weeping clear fluid. This is visible as it collects in the sole of his shoe. He denies any pain. He has redness and warmth. Review of Systems   Constitutional: Negative for chills and fever. HENT: Negative for congestion, ear pain, hearing loss, postnasal drip, rhinorrhea, sinus pressure, sinus pain, sore throat and trouble swallowing. Eyes: Negative for pain and visual disturbance. Respiratory: Negative for cough, chest tightness, shortness of breath and wheezing. Cardiovascular: Positive for leg swelling. Negative for chest pain and palpitations. Gastrointestinal: Negative for abdominal pain, blood in stool, constipation, diarrhea, nausea and vomiting. Endocrine: Negative for cold intolerance, heat intolerance, polydipsia, polyphagia and polyuria. Genitourinary: Negative for difficulty urinating, dysuria, flank pain and urgency. Musculoskeletal: Negative for arthralgias, back pain, gait problem and myalgias. Skin: Negative for rash. Allergic/Immunologic: Negative.     Neurological: Negative for dizziness, weakness, light-headedness and headaches. Hematological: Negative. Psychiatric/Behavioral: Negative for behavioral problems, dysphoric mood and sleep disturbance. The patient is not nervous/anxious. Current Outpatient Medications on File Prior to Visit   Medication Sig   • albuterol (Ventolin HFA) 90 mcg/act inhaler Inhale 2 puffs every 4 (four) hours as needed for wheezing or shortness of breath   • ALPRAZolam (XANAX) 0.5 mg tablet Take 1 tablet (0.5 mg total) by mouth 2 (two) times a day as needed for anxiety   • aspirin 81 MG tablet Take 81 mg by mouth daily   • azelastine (ASTELIN) 0.1 % nasal spray 1 spray into each nostril 2 (two) times a day   • fluticasone (FLONASE) 50 mcg/act nasal spray 1 spray into each nostril daily   • fluticasone-salmeterol (AirDuo RespiClick 497/83) 074-57 mcg/act dry powder inhaler Inhale 1 puff 2 (two) times a day Rinse mouth after use. • furosemide (LASIX) 40 mg tablet Take 1 tablet (40 mg total) by mouth daily as needed (swelling)   • losartan (COZAAR) 100 MG tablet Take 1 tablet (100 mg total) by mouth daily   • metoprolol succinate (Toprol XL) 25 mg 24 hr tablet Take 1 tablet (25 mg total) by mouth daily   • montelukast (SINGULAIR) 10 mg tablet Take 1 tablet (10 mg total) by mouth daily at bedtime   • pravastatin (PRAVACHOL) 40 mg tablet Take 1 tablet (40 mg total) by mouth daily   • rOPINIRole (REQUIP) 2 mg tablet Take 1 tablet (2 mg total) by mouth daily at bedtime   • zolpidem (AMBIEN) 10 mg tablet Take 1 tablet (10 mg total) by mouth daily at bedtime as needed for sleep       Objective     /68 (BP Location: Left arm, Patient Position: Sitting)   Pulse 70   Temp 99.2 °F (37.3 °C)   Ht 5' 11" (1.803 m)   Wt 127 kg (279 lb 4 oz)   SpO2 94%   BMI 38.95 kg/m²     Physical Exam  Vitals and nursing note reviewed. Constitutional:       General: He is not in acute distress. Appearance: He is well-developed. He is not diaphoretic. HENT:      Head: Normocephalic and atraumatic. Right Ear: External ear normal.      Left Ear: External ear normal.      Nose: Nose normal.      Mouth/Throat:      Pharynx: No oropharyngeal exudate. Eyes:      General: No scleral icterus. Right eye: No discharge. Left eye: No discharge. Conjunctiva/sclera: Conjunctivae normal.      Pupils: Pupils are equal, round, and reactive to light. Neck:      Thyroid: No thyromegaly. Cardiovascular:      Rate and Rhythm: Normal rate and regular rhythm. Heart sounds: Normal heart sounds. No murmur heard. No friction rub. No gallop. Pulmonary:      Effort: Pulmonary effort is normal. No respiratory distress. Breath sounds: Normal breath sounds. No wheezing or rales. Abdominal:      General: Bowel sounds are normal. There is no distension. Palpations: Abdomen is soft. Tenderness: There is no abdominal tenderness. Musculoskeletal:         General: No tenderness or deformity. Normal range of motion. Cervical back: Normal range of motion and neck supple. Right lower leg: Edema present. Left lower leg: Edema present. Skin:     General: Skin is warm and dry. Neurological:      Mental Status: He is alert and oriented to person, place, and time. Cranial Nerves: No cranial nerve deficit. Psychiatric:         Behavior: Behavior normal.         Thought Content:  Thought content normal.         Judgment: Judgment normal.       Eva Smart PA-C

## 2023-08-29 NOTE — ASSESSMENT & PLAN NOTE
Will have pt increase lasix to 40mg twice a day for the next week. Will also start keflex to avoid any cellulitis. Call if symptoms fail to improve.

## 2023-09-02 ENCOUNTER — NURSE TRIAGE (OUTPATIENT)
Dept: OTHER | Facility: OTHER | Age: 73
End: 2023-09-02

## 2023-09-02 NOTE — TELEPHONE ENCOUNTER
Regarding: Fluid coming out of heel  ----- Message from [facility] sent at 9/2/2023  9:52 AM EDT -----  " I had fluid in my Leg, fluid was coming out of my Foot, I was put on a water pill Monday and then she doubled it, The fluid is still coming out of my heel, My shoe feels wet when I take my shoe Off."

## 2023-09-02 NOTE — TELEPHONE ENCOUNTER
Patient calling to update provider on right leg edema. Patient was seen Monday by PCP for right leg swelling and leaking. He was placed on an antibiotic and advised to double his Lasix. He is calling to inform provider that symptoms have slowly improved but are still there. He denies cp/ trouble breathing. Denies signs of infection, and denies pain. Patient advised to continue taking medication, if anything changes over the weekend he should be seen in ED. Please follow up when office is open. Reason for Disposition  • Nursing judgment or information in reference    Answer Assessment - Initial Assessment Questions  1. REASON FOR CALL: "What is your main concern right now?"      Follow up regarding leg swelling and leaking of right leg/ right foot    2. ONSET: "When did the symptoms start?"      Monday 8/28    3. SEVERITY: "How bad is the symptoms?"      Moderate    4. FEVER: "Do you have a fever?"      Denies    5. OTHER SYMPTOMS: "Do you have any other new symptoms?"      Denies    6. INTERVENTIONS AND RESPONSE: "What have you done so far to try to make this better? What medications have you used?"      Patient taking antibiotic and doubled lasix dose    7.  PREGNANCY: "Is there any chance you are pregnant?"      N/A    Protocols used: NO GUIDELINE AVAILABLE-ADULT-

## 2023-09-05 ENCOUNTER — TELEPHONE (OUTPATIENT)
Dept: INTERNAL MEDICINE CLINIC | Facility: CLINIC | Age: 73
End: 2023-09-05

## 2023-09-06 NOTE — TELEPHONE ENCOUNTER
He said he has an appt with podiatry 11/2/23. He does not think he is going to go-he is not happy with that doctor-dr. Corona March.

## 2023-09-06 NOTE — TELEPHONE ENCOUNTER
I would like him to start wearing compression stocking and restricting his salt intake.  Let me know how it is helping with an update before the weekend

## 2023-09-08 ENCOUNTER — TELEPHONE (OUTPATIENT)
Dept: INTERNAL MEDICINE CLINIC | Facility: CLINIC | Age: 73
End: 2023-09-08

## 2023-09-08 DIAGNOSIS — I10 PRIMARY HYPERTENSION: ICD-10-CM

## 2023-09-08 RX ORDER — FUROSEMIDE 40 MG/1
40 TABLET ORAL DAILY PRN
Qty: 30 TABLET | Refills: 0 | Status: SHIPPED | OUTPATIENT
Start: 2023-09-08

## 2023-09-08 NOTE — TELEPHONE ENCOUNTER
Patient has been wearing the compression socks 24/7 since 9/6/23. He removed them today and his leg is much improved. He stopped using salt as well. He is going to continue taking 2 water pills for another week. He said "you are a genius" thank you so much.

## 2023-09-11 DIAGNOSIS — I10 PRIMARY HYPERTENSION: ICD-10-CM

## 2023-09-11 RX ORDER — FUROSEMIDE 40 MG/1
40 TABLET ORAL DAILY PRN
Qty: 30 TABLET | Refills: 0 | OUTPATIENT
Start: 2023-09-11

## 2023-09-28 DIAGNOSIS — E78.5 HYPERLIPIDEMIA, UNSPECIFIED HYPERLIPIDEMIA TYPE: ICD-10-CM

## 2023-09-28 RX ORDER — PRAVASTATIN SODIUM 40 MG
40 TABLET ORAL DAILY
Qty: 90 TABLET | Refills: 0 | Status: SHIPPED | OUTPATIENT
Start: 2023-09-28

## 2023-10-06 DIAGNOSIS — I10 PRIMARY HYPERTENSION: ICD-10-CM

## 2023-10-06 NOTE — TELEPHONE ENCOUNTER
patient needs refill on furosemide (LASIX) 40 mg tablet Please send to [facility]

## 2023-10-09 RX ORDER — FUROSEMIDE 40 MG/1
40 TABLET ORAL DAILY PRN
Qty: 30 TABLET | Refills: 0 | Status: SHIPPED | OUTPATIENT
Start: 2023-10-09

## 2023-10-17 ENCOUNTER — OFFICE VISIT (OUTPATIENT)
Dept: INTERNAL MEDICINE CLINIC | Facility: CLINIC | Age: 73
End: 2023-10-17
Payer: MEDICARE

## 2023-10-17 VITALS
DIASTOLIC BLOOD PRESSURE: 62 MMHG | SYSTOLIC BLOOD PRESSURE: 116 MMHG | HEART RATE: 103 BPM | HEIGHT: 71 IN | TEMPERATURE: 97.4 F | RESPIRATION RATE: 18 BRPM | WEIGHT: 276.2 LBS | OXYGEN SATURATION: 93 % | BODY MASS INDEX: 38.67 KG/M2

## 2023-10-17 DIAGNOSIS — Z12.5 SCREENING PSA (PROSTATE SPECIFIC ANTIGEN): ICD-10-CM

## 2023-10-17 DIAGNOSIS — Z13.0 SCREENING FOR DEFICIENCY ANEMIA: ICD-10-CM

## 2023-10-17 DIAGNOSIS — I10 PRIMARY HYPERTENSION: Primary | ICD-10-CM

## 2023-10-17 DIAGNOSIS — Z23 ENCOUNTER FOR IMMUNIZATION: ICD-10-CM

## 2023-10-17 DIAGNOSIS — Z13.29 SCREENING FOR THYROID DISORDER: ICD-10-CM

## 2023-10-17 DIAGNOSIS — E55.9 VITAMIN D DEFICIENCY: ICD-10-CM

## 2023-10-17 DIAGNOSIS — R73.01 ELEVATED FASTING GLUCOSE: ICD-10-CM

## 2023-10-17 DIAGNOSIS — E78.2 MIXED HYPERLIPIDEMIA: ICD-10-CM

## 2023-10-17 PROCEDURE — 99213 OFFICE O/P EST LOW 20 MIN: CPT | Performed by: PHYSICIAN ASSISTANT

## 2023-10-17 PROCEDURE — 90677 PCV20 VACCINE IM: CPT

## 2023-10-17 PROCEDURE — G0009 ADMIN PNEUMOCOCCAL VACCINE: HCPCS

## 2023-10-17 PROCEDURE — G0008 ADMIN INFLUENZA VIRUS VAC: HCPCS

## 2023-10-17 PROCEDURE — 90662 IIV NO PRSV INCREASED AG IM: CPT

## 2023-10-17 NOTE — PROGRESS NOTES
Name: Meka Salazar      :       MRN: [id number]  Encounter Provider: Sharonda Hermosillo PA-C  Encounter Date: 10/17/2023   Encounter department: [id number]     1. Primary hypertension  Assessment & Plan:  Pts symptoms are stable with current regime. No changes at present. Orders:  -     Comprehensive metabolic panel; Future    2. Encounter for immunization  -     influenza vaccine, high-dose, PF 0.7 mL (FLUZONE HIGH-DOSE)  -     Pneumococcal Conjugate Vaccine 20-valent (Pcv20)    3. Mixed hyperlipidemia  -     Lipid panel; Future    4. Vitamin D deficiency  -     Vitamin D 25 hydroxy; Future    5. Screening PSA (prostate specific antigen)  -     PSA, Total Screen; Future    6. Screening for thyroid disorder  -     TSH, 3rd generation; Future    7. Screening for deficiency anemia  -     CBC and differential; Future    8. Elevated fasting glucose  -     HEMOGLOBIN A1C W/ EAG ESTIMATION; Future        Depression Screening and Follow-up Plan: Patient was screened for depression during today's encounter. They screened negative with a PHQ-2 score of 0. Subjective      Pt presents for routine visit. He is doing well overall. His lower extremity edema and weeping of fluid from his heels has resolved nicely. His breathing has also been much improved. He denies CP or SOB. BP is nicely controlled. He will be due for labs prior to our next visit. He is due for flu and prevnar and is agreeable to both. Review of Systems   Constitutional:  Negative for chills and fever. HENT:  Negative for congestion, ear pain, hearing loss, postnasal drip, rhinorrhea, sinus pressure, sinus pain, sore throat and trouble swallowing. Eyes:  Negative for pain and visual disturbance. Respiratory:  Negative for cough, chest tightness, shortness of breath and wheezing. Cardiovascular: Negative. Negative for chest pain, palpitations and leg swelling. Gastrointestinal:  Negative for abdominal pain, blood in stool, constipation, diarrhea, nausea and vomiting. Endocrine: Negative for cold intolerance, heat intolerance, polydipsia, polyphagia and polyuria. Genitourinary:  Negative for difficulty urinating, dysuria, flank pain and urgency. Musculoskeletal:  Negative for arthralgias, back pain, gait problem and myalgias. Skin:  Negative for rash. Allergic/Immunologic: Negative. Neurological:  Negative for dizziness, weakness, light-headedness and headaches. Hematological: Negative. Psychiatric/Behavioral:  Negative for behavioral problems, dysphoric mood and sleep disturbance. The patient is not nervous/anxious. Current Outpatient Medications on File Prior to Visit   Medication Sig   • albuterol (Ventolin HFA) 90 mcg/act inhaler Inhale 2 puffs every 4 (four) hours as needed for wheezing or shortness of breath   • ALPRAZolam (XANAX) 0.5 mg tablet Take 1 tablet (0.5 mg total) by mouth 2 (two) times a day as needed for anxiety   • aspirin 81 MG tablet Take 81 mg by mouth daily   • azelastine (ASTELIN) 0.1 % nasal spray 1 spray into each nostril 2 (two) times a day   • fluticasone (FLONASE) 50 mcg/act nasal spray 1 spray into each nostril daily   • fluticasone-salmeterol (AirDuo RespiClick 131/50) 900-36 mcg/act dry powder inhaler Inhale 1 puff 2 (two) times a day Rinse mouth after use.    • furosemide (LASIX) 40 mg tablet Take 1 tablet (40 mg total) by mouth daily as needed (swelling)   • losartan (COZAAR) 100 MG tablet Take 1 tablet (100 mg total) by mouth daily   • metoprolol succinate (Toprol XL) 25 mg 24 hr tablet Take 1 tablet (25 mg total) by mouth daily   • montelukast (SINGULAIR) 10 mg tablet Take 1 tablet (10 mg total) by mouth daily at bedtime   • pravastatin (PRAVACHOL) 40 mg tablet Take 1 tablet by mouth once daily   • rOPINIRole (REQUIP) 2 mg tablet Take 1 tablet (2 mg total) by mouth daily at bedtime   • zolpidem (AMBIEN) 10 mg tablet Take 1 tablet (10 mg total) by mouth daily at bedtime as needed for sleep       Objective     /62   Pulse 103   Temp (!) 97.4 °F (36.3 °C)   Resp 18   Ht 5' 11" (1.803 m)   Wt 125 kg (276 lb 3.2 oz)   SpO2 93%   BMI 38.52 kg/m²     Physical Exam  Vitals and nursing note reviewed. Constitutional:       General: He is not in acute distress. Appearance: Normal appearance. He is well-developed. He is not diaphoretic. HENT:      Head: Normocephalic and atraumatic. Right Ear: External ear normal.      Left Ear: External ear normal.      Nose: Nose normal.      Mouth/Throat:      Pharynx: No oropharyngeal exudate. Eyes:      General: No scleral icterus. Right eye: No discharge. Left eye: No discharge. Conjunctiva/sclera: Conjunctivae normal.      Pupils: Pupils are equal, round, and reactive to light. Neck:      Thyroid: No thyromegaly. Cardiovascular:      Rate and Rhythm: Normal rate and regular rhythm. Heart sounds: Normal heart sounds. No murmur heard. No friction rub. No gallop. Pulmonary:      Effort: Pulmonary effort is normal. No respiratory distress. Breath sounds: Normal breath sounds. No wheezing or rales. Abdominal:      General: Bowel sounds are normal. There is no distension. Palpations: Abdomen is soft. Tenderness: There is no abdominal tenderness. Musculoskeletal:         General: No tenderness or deformity. Normal range of motion. Cervical back: Normal range of motion and neck supple. Skin:     General: Skin is warm and dry. Neurological:      Mental Status: He is alert and oriented to person, place, and time. Cranial Nerves: No cranial nerve deficit. Psychiatric:         Behavior: Behavior normal.         Thought Content:  Thought content normal.         Judgment: Judgment normal.       Eva Smart PA-C

## 2023-11-02 ENCOUNTER — OFFICE VISIT (OUTPATIENT)
Dept: PODIATRY | Facility: CLINIC | Age: 73
End: 2023-11-02
Payer: MEDICARE

## 2023-11-02 VITALS
WEIGHT: 276 LBS | SYSTOLIC BLOOD PRESSURE: 132 MMHG | DIASTOLIC BLOOD PRESSURE: 76 MMHG | HEIGHT: 71 IN | BODY MASS INDEX: 38.64 KG/M2 | HEART RATE: 107 BPM

## 2023-11-02 DIAGNOSIS — I73.9 PERIPHERAL ARTERIAL DISEASE (HCC): ICD-10-CM

## 2023-11-02 DIAGNOSIS — B35.1 ONYCHOMYCOSIS: Primary | ICD-10-CM

## 2023-11-02 DIAGNOSIS — L84 CALLUS: ICD-10-CM

## 2023-11-02 PROCEDURE — 11721 DEBRIDE NAIL 6 OR MORE: CPT | Performed by: PODIATRIST

## 2023-11-02 PROCEDURE — 11056 PARNG/CUTG B9 HYPRKR LES 2-4: CPT | Performed by: PODIATRIST

## 2023-11-02 NOTE — PROGRESS NOTES
Priscila Burton  1950  AT RISK FOOT CARE    1. Onychomycosis        2. Callus        3. Peripheral arterial disease [facility])            Patient presents for at-risk foot care. Patient has no acute concerns today. Patient has significant lower extremity risk due to diminished pulses in the feet and trophic skin changes to the lower extremity including thick toenail, atrophic skin, and decreased hair growth. On exam patient has thickened, hypertrophic, discolored, brittle toenails with subungual debris and tenderness x10   Callus: 4  Patient has diminished pedal pulses and decreased perfusion to the lower extremities  Patient has significant trophic changes to the skin including thick toenails, decreased pedal hair and atrophic skin. Today's treatment includes:  Debridement of toenails. Using nail nipper, jerica, and curette, nails were sharply debrided, reduced in thickness and length. Devitalized nail tissue and fungal debris excised and removed. Patient tolerated well. Discussed proper shoe gear, daily inspections of feet, and general foot health with patient. Patient has Q8  findings and is recommended for at risk foot care every 9-10 weeks. Procedure: All mycotic toenails were reduced and debrided in length, width, and girth using a nail nipper and dremel. All hyperkeratotic skin lesion(s) were sharply pared with a scalpel with no bleeding or evidence of ulceration. Patient tolerated procedure(s) well without complications.

## 2023-11-06 DIAGNOSIS — I10 PRIMARY HYPERTENSION: ICD-10-CM

## 2023-11-06 RX ORDER — FUROSEMIDE 40 MG/1
40 TABLET ORAL DAILY PRN
Qty: 30 TABLET | Refills: 0 | Status: SHIPPED | OUTPATIENT
Start: 2023-11-06

## 2023-11-07 ENCOUNTER — OFFICE VISIT (OUTPATIENT)
Dept: PULMONOLOGY | Facility: CLINIC | Age: 73
End: 2023-11-07
Payer: MEDICARE

## 2023-11-07 VITALS
BODY MASS INDEX: 38.98 KG/M2 | WEIGHT: 278.4 LBS | DIASTOLIC BLOOD PRESSURE: 70 MMHG | OXYGEN SATURATION: 95 % | TEMPERATURE: 98.4 F | SYSTOLIC BLOOD PRESSURE: 128 MMHG | HEIGHT: 71 IN | HEART RATE: 102 BPM

## 2023-11-07 DIAGNOSIS — J45.909 REACTIVE AIRWAY DISEASE WITHOUT COMPLICATION, UNSPECIFIED ASTHMA SEVERITY, UNSPECIFIED WHETHER PERSISTENT: Primary | ICD-10-CM

## 2023-11-07 DIAGNOSIS — J30.89 ENVIRONMENTAL AND SEASONAL ALLERGIES: ICD-10-CM

## 2023-11-07 DIAGNOSIS — E66.09 CLASS 2 OBESITY DUE TO EXCESS CALORIES WITH BODY MASS INDEX (BMI) OF 38.0 TO 38.9 IN ADULT, UNSPECIFIED WHETHER SERIOUS COMORBIDITY PRESENT: ICD-10-CM

## 2023-11-07 PROCEDURE — 99214 OFFICE O/P EST MOD 30 MIN: CPT

## 2023-11-07 RX ORDER — FLUTICASONE PROPIONATE AND SALMETEROL 232; 14 UG/1; UG/1
1 POWDER, METERED RESPIRATORY (INHALATION) 2 TIMES DAILY
Qty: 1 EACH | Refills: 3 | Status: SHIPPED | OUTPATIENT
Start: 2023-11-07

## 2023-11-07 NOTE — ASSESSMENT & PLAN NOTE
-Continue Singulair 10 mg at bedtime, OTC antihistamine daily such as Zyrtec or Allegra, and Astelin nasal spray  -Avoid known allergens when able.

## 2023-11-07 NOTE — PROGRESS NOTES
Pulmonary Follow Up Note  Isadora Sherwood 74 y.o. male MRN: [id number]  11/7/2023    Assessment:    Reactive airway disease without complication  -Recent PFTs normal  -NE allergy panel with allergens to cats, dogs, dust mites, and grass  -Eosinophils 540 on prior CBC  -Symptoms currently stable on air duo as needed  -Continue air duo and albuterol as needed. Continue Singulair nightly.   -Avoid known allergens  -He is UTD on vaccinations  -Follow-up in 6 months or sooner if needed      Environmental and seasonal allergies  -Continue Singulair 10 mg at bedtime, OTC antihistamine daily such as Zyrtec or Allegra, and Astelin nasal spray  -Avoid known allergens when able. Class 2 obesity due to excess calories with body mass index (BMI) of 38.0 to 38.9 in adult  -Encouraged weight loss    Plan:    Diagnoses and all orders for this visit:    Reactive airway disease without complication, unspecified asthma severity, unspecified whether persistent  -     fluticasone-salmeterol (AirDuo RespiClick 780/49) 554-07 mcg/act dry powder inhaler; Inhale 1 puff 2 (two) times a day Rinse mouth after use. Class 2 obesity due to excess calories with body mass index (BMI) of 38.0 to 38.9 in adult, unspecified whether serious comorbidity present    Environmental and seasonal allergies      Return in about 6 months (around 5/7/2024). History of Present Illness     Chief Complaint:   Chief Complaint   Patient presents with    Follow-up       Patient ID: Laquita Biggs is a 74 y.o. y.o. male has a past medical history of Arthritis, Hyperlipidemia, Hypertension, Obesity, and Stage 3 chronic kidney disease, unspecified whether stage 3a or 3b CKD ([facility]) (6/28/2023). 11/7/2023  HPI: Isadora Sherwood is a 74 y.o. male who presents to the office today for a follow up visit. He has a PMH of environmental and seasonal allergies, bronchitis, HTN, PAD, CKD 3, HLD, and obesity.  He has about 8-pack-year smoking history and quit 50 years ago. He was previously seen in the office 3 months ago. Sent for NE allergy testing which revealed significant allergies to cats, dogs, dust mites, and grass with an elevated IgE of 181. He was started on Singulair and AirDuo for continued daily wheezing and dry cough. Patient states he is doing well with his breathing. Has had no exacerbations since his last office visit. Patient reports he has been using air duo as needed and needing albuterol maybe every 3 days for dry tickling cough. Denies any daily symptoms of shortness of breath, wheezing, mucus production, chest pain/chest tightness, or lower extremity swelling. Review of Systems   Constitutional:  Negative for activity change, chills, fever and unexpected weight change. HENT:  Negative for congestion, postnasal drip, rhinorrhea, sore throat and trouble swallowing. Respiratory:  Negative for cough, chest tightness, shortness of breath and wheezing. Cardiovascular:  Negative for chest pain, palpitations and leg swelling. Allergic/Immunologic: Positive for environmental allergies. Historical Information   Past Medical History:   Diagnosis Date    Arthritis     Hyperlipidemia     Hypertension     Obesity     Stage 3 chronic kidney disease, unspecified whether stage 3a or 3b CKD ([address]) 6/28/2023     Past Surgical History:   Procedure Laterality Date    CARDIAC SURGERY      heart catherization    CARPAL TUNNEL RELEASE Bilateral     COLONOSCOPY      ELBOW SURGERY Bilateral     implants in place    FINGER SURGERY Left     pin in place - for arthritis    GANGLION CYST EXCISION Left     HAND SURGERY Bilateral     KNEE SURGERY Left     with repair of meniscus     Family History   Problem Relation Age of Onset    Rheum arthritis Mother     Lupus Mother     Heart disease Father     Diabetes Sister     Kidney disease Sister     Heart disease Paternal Grandmother        Smoking history: He reports that he has quit smoking.  His smoking use included cigarettes.  He has never used smokeless tobacco.    Occupational History:     Immunization History   Administered Date(s) Administered    COVID-19 MODERNA VACC 0.5 ML IM 04/02/2021, 05/03/2021, 12/13/2021    INFLUENZA 11/03/2016, 09/15/2017, 10/18/2019, 12/04/2019    Influenza Split High Dose Preservative Free IM 10/18/2019    Influenza, high dose seasonal 0.7 mL 12/18/2018, 09/14/2021, 10/17/2023    Pneumococcal Conjugate 13-Valent 06/19/2018    Pneumococcal Conjugate Vaccine 20-valent (Pcv20), Polysace 10/17/2023       Meds/Allergies     Current Outpatient Medications:     albuterol (Ventolin HFA) 90 mcg/act inhaler, Inhale 2 puffs every 4 (four) hours as needed for wheezing or shortness of breath, Disp: 18 g, Rfl: 2    ALPRAZolam (XANAX) 0.5 mg tablet, Take 1 tablet (0.5 mg total) by mouth 2 (two) times a day as needed for anxiety, Disp: 60 tablet, Rfl: 1    aspirin 81 MG tablet, Take 81 mg by mouth daily, Disp: , Rfl:     azelastine (ASTELIN) 0.1 % nasal spray, 1 spray into each nostril 2 (two) times a day, Disp: 30 mL, Rfl: 11    fluticasone (FLONASE) 50 mcg/act nasal spray, 1 spray into each nostril daily, Disp: , Rfl:     fluticasone-salmeterol (AirDuo RespiClick 719/76) 150-93 mcg/act dry powder inhaler, Inhale 1 puff 2 (two) times a day Rinse mouth after use., Disp: 1 each, Rfl: 3    furosemide (LASIX) 40 mg tablet, Take 1 tablet (40 mg total) by mouth daily as needed (swelling), Disp: 30 tablet, Rfl: 0    losartan (COZAAR) 100 MG tablet, Take 1 tablet (100 mg total) by mouth daily, Disp: 30 tablet, Rfl: 5    metoprolol succinate (Toprol XL) 25 mg 24 hr tablet, Take 1 tablet (25 mg total) by mouth daily, Disp: 30 tablet, Rfl: 5    montelukast (SINGULAIR) 10 mg tablet, Take 1 tablet (10 mg total) by mouth daily at bedtime, Disp: 30 tablet, Rfl: 3    pravastatin (PRAVACHOL) 40 mg tablet, Take 1 tablet by mouth once daily, Disp: 90 tablet, Rfl: 0    zolpidem (AMBIEN) 10 mg tablet, Take 1 tablet (10 mg total) by mouth daily at bedtime as needed for sleep, Disp: 30 tablet, Rfl: 5    rOPINIRole (REQUIP) 2 mg tablet, Take 1 tablet (2 mg total) by mouth daily at bedtime (Patient not taking: Reported on 11/7/2023), Disp: 30 tablet, Rfl: 5  Allergies: Allergies   Allergen Reactions    Other      Summertime grass, weeds         Vitals:  Vitals:    11/07/23 1005   BP: 128/70   BP Location: Left arm   Patient Position: Sitting   Cuff Size: Large   Pulse: 102   Temp: 98.4 °F (36.9 °C)   TempSrc: Temporal   SpO2: 95%   Weight: 126 kg (278 lb 6.4 oz)   Height: 5' 11" (1.803 m)   Oxygen Therapy  SpO2: 95 %  . Wt Readings from Last 3 Encounters:   11/07/23 126 kg (278 lb 6.4 oz)   11/02/23 125 kg (276 lb)   10/17/23 125 kg (276 lb 3.2 oz)     Body mass index is 38.83 kg/m². Physical Exam  Vitals and nursing note reviewed. Constitutional:       General: He is not in acute distress. Appearance: Normal appearance. He is well-developed. He is obese. Cardiovascular:      Rate and Rhythm: Normal rate and regular rhythm. Heart sounds: Normal heart sounds. No murmur heard. Pulmonary:      Effort: Pulmonary effort is normal. No respiratory distress. Breath sounds: Normal breath sounds. No decreased breath sounds, wheezing, rhonchi or rales. Musculoskeletal:         General: No swelling. Right lower leg: No edema. Left lower leg: No edema. Psychiatric:         Mood and Affect: Mood and affect normal.         Behavior: Behavior normal. Behavior is cooperative. Labs: I have personally reviewed pertinent lab results.   Lab Results   Component Value Date    WBC 8.75 06/25/2023    HGB 13.4 06/25/2023    HCT 41.9 06/25/2023    MCV 97 06/25/2023     06/25/2023     Lab Results   Component Value Date    CALCIUM 8.6 06/25/2023    K 3.8 06/25/2023    CO2 29 06/25/2023     06/25/2023    BUN 19 06/25/2023    CREATININE 1.20 06/25/2023     Lab Results   Component Value Date     (H) 07/24/2023     Lab Results   Component Value Date    ALT 28 06/25/2023    AST 21 06/25/2023    ALKPHOS 42 06/25/2023       Imaging and other studies: I have personally reviewed pertinent reports and I have personally reviewed pertinent films in PACS     Pulmonary function testing:     Pulmonary Functions Testing Results: 7/28/23     FEV1/FVC ratio 73%    FEV1 57% predicted  FVC 58% predicted  (-) response to bronchodilators  TLC 70 % predicted  RV 99 % predicted  DLCO corrected for hemoglobin 83 % predicted     Impression: Reduced total lung capacity at 70% predicted with a relatively normal residual volume. Restrictive pattern on spirometry. No significant improvement in airflow or vital capacity following the administration of bronchodilators. Normal corrected diffusion capacity. Normal airway resistance as indicated by the specific airway conductance.

## 2023-11-07 NOTE — ASSESSMENT & PLAN NOTE
-Recent PFTs no evidence of obstruction/BD response  -NE allergy panel with allergens to cats, dogs, dust mites, and grass  -Eosinophils 540 on prior CBC  -Symptoms improved/stable on air duo as needed  -Continue air duo and albuterol as needed.   Continue Singulair nightly.   -Avoid known allergens  -He is UTD on vaccinations  -Follow-up in 6 months or sooner if needed

## 2023-11-14 ENCOUNTER — TELEPHONE (OUTPATIENT)
Dept: UROLOGY | Facility: CLINIC | Age: 73
End: 2023-11-14

## 2023-11-14 NOTE — TELEPHONE ENCOUNTER
Patient called stating his insurance will not cover the inhaler. They are asking if you can send in one of the cheaper options? Is there any you would prefer?

## 2023-11-16 NOTE — TELEPHONE ENCOUNTER
Per insurance this is cheapest option. If patient calls the insurance he can have it go through [facility] for alittle cheaper. He is calling to discuss with insurance.

## 2023-12-01 DIAGNOSIS — J45.909 REACTIVE AIRWAY DISEASE WITHOUT COMPLICATION, UNSPECIFIED ASTHMA SEVERITY, UNSPECIFIED WHETHER PERSISTENT: ICD-10-CM

## 2023-12-01 RX ORDER — FLUTICASONE PROPIONATE AND SALMETEROL 232; 14 UG/1; UG/1
1 POWDER, METERED RESPIRATORY (INHALATION) 2 TIMES DAILY
Qty: 1 EACH | Refills: 3 | Status: CANCELLED | OUTPATIENT
Start: 2023-12-01

## 2023-12-06 DIAGNOSIS — J45.909 REACTIVE AIRWAY DISEASE WITHOUT COMPLICATION, UNSPECIFIED ASTHMA SEVERITY, UNSPECIFIED WHETHER PERSISTENT: ICD-10-CM

## 2023-12-06 RX ORDER — FLUTICASONE PROPIONATE AND SALMETEROL 232; 14 UG/1; UG/1
1 POWDER, METERED RESPIRATORY (INHALATION) 2 TIMES DAILY
Qty: 1 EACH | Refills: 3 | OUTPATIENT
Start: 2023-12-06

## 2023-12-06 NOTE — TELEPHONE ENCOUNTER
Patient calling regarding AirDuo Respiclick inhaler. He is unable to get filled at [facility] DR KRAIG CARO. He would like it to be sent to [address] [place] [address] for a 90 day supply please. Patient provided fax for [facility] if needed fax# [phone]  Thank you!

## 2023-12-08 RX ORDER — FLUTICASONE PROPIONATE AND SALMETEROL 232; 14 UG/1; UG/1
1 POWDER, METERED RESPIRATORY (INHALATION) 2 TIMES DAILY
Qty: 1 EACH | Refills: 3 | Status: CANCELLED | OUTPATIENT
Start: 2023-12-08

## 2023-12-08 NOTE — TELEPHONE ENCOUNTER
Nicole Yancy stated that she just filled this last month with 3 refills. I tried to call patient and got vm. He will need to have it transferred if he wants to change pharmacies.   Yelitza Ramires

## 2023-12-11 DIAGNOSIS — I10 PRIMARY HYPERTENSION: ICD-10-CM

## 2023-12-11 RX ORDER — FUROSEMIDE 40 MG/1
40 TABLET ORAL DAILY PRN
Qty: 30 TABLET | Refills: 3 | Status: SHIPPED | OUTPATIENT
Start: 2023-12-11

## 2023-12-19 RX ORDER — FLUTICASONE PROPIONATE AND SALMETEROL 232; 14 UG/1; UG/1
1 POWDER, METERED RESPIRATORY (INHALATION) 2 TIMES DAILY
Qty: 1 EACH | Refills: 3 | Status: SHIPPED | OUTPATIENT
Start: 2023-12-19

## 2023-12-19 NOTE — TELEPHONE ENCOUNTER
Patient calling in regards to prescription for inhaler, he has been out and would like to restart. [facility] is  waiting on a script from us to fill prescription for patient. [facility] is advising patient that Physician office will need to send new prescription to [facility].

## 2023-12-22 DIAGNOSIS — I10 PRIMARY HYPERTENSION: ICD-10-CM

## 2023-12-22 RX ORDER — LOSARTAN POTASSIUM 100 MG/1
100 TABLET ORAL DAILY
Qty: 30 TABLET | Refills: 5 | Status: SHIPPED | OUTPATIENT
Start: 2023-12-22

## 2024-01-02 DIAGNOSIS — E78.5 HYPERLIPIDEMIA, UNSPECIFIED HYPERLIPIDEMIA TYPE: ICD-10-CM

## 2024-01-02 RX ORDER — PRAVASTATIN SODIUM 40 MG
40 TABLET ORAL DAILY
Qty: 90 TABLET | Refills: 0 | Status: SHIPPED | OUTPATIENT
Start: 2024-01-02

## 2024-01-04 ENCOUNTER — OFFICE VISIT (OUTPATIENT)
Dept: PODIATRY | Facility: CLINIC | Age: 74
End: 2024-01-04
Payer: MEDICARE

## 2024-01-04 VITALS — SYSTOLIC BLOOD PRESSURE: 153 MMHG | HEART RATE: 93 BPM | DIASTOLIC BLOOD PRESSURE: 86 MMHG

## 2024-01-04 DIAGNOSIS — R60.0 LOCALIZED EDEMA: ICD-10-CM

## 2024-01-04 DIAGNOSIS — L84 CALLUS: ICD-10-CM

## 2024-01-04 DIAGNOSIS — I73.9 PERIPHERAL ARTERIAL DISEASE (HCC): ICD-10-CM

## 2024-01-04 DIAGNOSIS — B35.1 ONYCHOMYCOSIS: Primary | ICD-10-CM

## 2024-01-04 PROCEDURE — 99213 OFFICE O/P EST LOW 20 MIN: CPT | Performed by: PODIATRIST

## 2024-01-04 PROCEDURE — 11056 PARNG/CUTG B9 HYPRKR LES 2-4: CPT | Performed by: PODIATRIST

## 2024-01-04 PROCEDURE — 11721 DEBRIDE NAIL 6 OR MORE: CPT | Performed by: PODIATRIST

## 2024-01-04 NOTE — PROGRESS NOTES
Umang GONZALEZ Alexandra  1950  AT RISK FOOT CARE    1. Onychomycosis        2. Callus        3. Peripheral arterial disease (HCC)        4. Localized edema            Patient presents for at-risk foot care.  Patient has no acute concerns today.  Patient has significant lower extremity risk due to diminished pulses in the feet and trophic skin changes to the lower extremity including thick toenail, atrophic skin, and decreased hair growth.      On exam patient has thickened, hypertrophic, discolored, brittle toenails with subungual debris and tenderness x10   Callus: 4  Patient has diminished pedal pulses and decreased perfusion to the lower extremities  Patient has significant trophic changes to the skin including thick toenails, decreased pedal hair and atrophic skin.     Today's treatment includes:  Debridement of toenails. Using nail nipper, jerica, and curette, nails were sharply debrided, reduced in thickness and length. Devitalized nail tissue and fungal debris excised and removed. Patient tolerated well.      Discussed proper shoe gear, daily inspections of feet, and general foot health with patient. Patient has Q8  findings and is recommended for at risk foot care every 9-10 weeks.      Procedure: All mycotic toenails were reduced and debrided in length, width, and girth using a nail nipper and dremel.  All hyperkeratotic skin lesion(s) were sharply pared with a scalpel with no bleeding or evidence of ulceration.  Patient tolerated procedure(s) well without complications.

## 2024-01-11 DIAGNOSIS — I10 PRIMARY HYPERTENSION: ICD-10-CM

## 2024-01-11 RX ORDER — METOPROLOL SUCCINATE 25 MG/1
25 TABLET, EXTENDED RELEASE ORAL DAILY
Qty: 90 TABLET | Refills: 1 | Status: SHIPPED | OUTPATIENT
Start: 2024-01-11

## 2024-01-17 ENCOUNTER — APPOINTMENT (OUTPATIENT)
Dept: LAB | Facility: CLINIC | Age: 74
End: 2024-01-17
Payer: MEDICARE

## 2024-01-17 DIAGNOSIS — R73.01 ELEVATED FASTING GLUCOSE: ICD-10-CM

## 2024-01-17 DIAGNOSIS — Z12.5 SCREENING PSA (PROSTATE SPECIFIC ANTIGEN): ICD-10-CM

## 2024-01-17 DIAGNOSIS — E78.2 MIXED HYPERLIPIDEMIA: ICD-10-CM

## 2024-01-17 DIAGNOSIS — Z13.0 SCREENING FOR DEFICIENCY ANEMIA: ICD-10-CM

## 2024-01-17 DIAGNOSIS — E55.9 VITAMIN D DEFICIENCY: ICD-10-CM

## 2024-01-17 DIAGNOSIS — I10 PRIMARY HYPERTENSION: ICD-10-CM

## 2024-01-17 DIAGNOSIS — Z13.29 SCREENING FOR THYROID DISORDER: ICD-10-CM

## 2024-01-17 LAB
25(OH)D3 SERPL-MCNC: 11.2 NG/ML (ref 30–100)
ALBUMIN SERPL BCP-MCNC: 4.1 G/DL (ref 3.5–5)
ALP SERPL-CCNC: 50 U/L (ref 34–104)
ALT SERPL W P-5'-P-CCNC: 38 U/L (ref 7–52)
ANION GAP SERPL CALCULATED.3IONS-SCNC: 8 MMOL/L
AST SERPL W P-5'-P-CCNC: 28 U/L (ref 13–39)
BASOPHILS # BLD AUTO: 0.06 THOUSANDS/ÂΜL (ref 0–0.1)
BASOPHILS NFR BLD AUTO: 1 % (ref 0–1)
BILIRUB SERPL-MCNC: 1.25 MG/DL (ref 0.2–1)
BUN SERPL-MCNC: 19 MG/DL (ref 5–25)
CALCIUM SERPL-MCNC: 8.7 MG/DL (ref 8.4–10.2)
CHLORIDE SERPL-SCNC: 103 MMOL/L (ref 96–108)
CHOLEST SERPL-MCNC: 126 MG/DL
CO2 SERPL-SCNC: 27 MMOL/L (ref 21–32)
CREAT SERPL-MCNC: 0.91 MG/DL (ref 0.6–1.3)
EOSINOPHIL # BLD AUTO: 0.25 THOUSAND/ÂΜL (ref 0–0.61)
EOSINOPHIL NFR BLD AUTO: 3 % (ref 0–6)
ERYTHROCYTE [DISTWIDTH] IN BLOOD BY AUTOMATED COUNT: 13.5 % (ref 11.6–15.1)
EST. AVERAGE GLUCOSE BLD GHB EST-MCNC: 131 MG/DL
GFR SERPL CREATININE-BSD FRML MDRD: 82 ML/MIN/1.73SQ M
GLUCOSE P FAST SERPL-MCNC: 114 MG/DL (ref 65–99)
HBA1C MFR BLD: 6.2 %
HCT VFR BLD AUTO: 47 % (ref 36.5–49.3)
HDLC SERPL-MCNC: 41 MG/DL
HGB BLD-MCNC: 15.1 G/DL (ref 12–17)
IMM GRANULOCYTES # BLD AUTO: 0.02 THOUSAND/UL (ref 0–0.2)
IMM GRANULOCYTES NFR BLD AUTO: 0 % (ref 0–2)
LDLC SERPL CALC-MCNC: 61 MG/DL (ref 0–100)
LYMPHOCYTES # BLD AUTO: 2.92 THOUSANDS/ÂΜL (ref 0.6–4.47)
LYMPHOCYTES NFR BLD AUTO: 31 % (ref 14–44)
MCH RBC QN AUTO: 30.9 PG (ref 26.8–34.3)
MCHC RBC AUTO-ENTMCNC: 32.1 G/DL (ref 31.4–37.4)
MCV RBC AUTO: 96 FL (ref 82–98)
MONOCYTES # BLD AUTO: 0.8 THOUSAND/ÂΜL (ref 0.17–1.22)
MONOCYTES NFR BLD AUTO: 9 % (ref 4–12)
NEUTROPHILS # BLD AUTO: 5.26 THOUSANDS/ÂΜL (ref 1.85–7.62)
NEUTS SEG NFR BLD AUTO: 56 % (ref 43–75)
NONHDLC SERPL-MCNC: 85 MG/DL
NRBC BLD AUTO-RTO: 0 /100 WBCS
PLATELET # BLD AUTO: 185 THOUSANDS/UL (ref 149–390)
PMV BLD AUTO: 12 FL (ref 8.9–12.7)
POTASSIUM SERPL-SCNC: 4.1 MMOL/L (ref 3.5–5.3)
PREALB SERPL-MCNC: 25.4 MG/DL (ref 17–34)
PROT SERPL-MCNC: 6.1 G/DL (ref 6.4–8.4)
PSA SERPL-MCNC: 0.99 NG/ML (ref 0–4)
RBC # BLD AUTO: 4.88 MILLION/UL (ref 3.88–5.62)
SODIUM SERPL-SCNC: 138 MMOL/L (ref 135–147)
TRIGL SERPL-MCNC: 119 MG/DL
TSH SERPL DL<=0.05 MIU/L-ACNC: 1.43 UIU/ML (ref 0.45–4.5)
WBC # BLD AUTO: 9.31 THOUSAND/UL (ref 4.31–10.16)

## 2024-01-17 PROCEDURE — 82306 VITAMIN D 25 HYDROXY: CPT

## 2024-01-17 PROCEDURE — 84443 ASSAY THYROID STIM HORMONE: CPT

## 2024-01-17 PROCEDURE — 80053 COMPREHEN METABOLIC PANEL: CPT

## 2024-01-17 PROCEDURE — G0103 PSA SCREENING: HCPCS

## 2024-01-17 PROCEDURE — 85025 COMPLETE CBC W/AUTO DIFF WBC: CPT

## 2024-01-17 PROCEDURE — 36415 COLL VENOUS BLD VENIPUNCTURE: CPT

## 2024-01-17 PROCEDURE — 80061 LIPID PANEL: CPT

## 2024-01-22 ENCOUNTER — OFFICE VISIT (OUTPATIENT)
Dept: INTERNAL MEDICINE CLINIC | Facility: CLINIC | Age: 74
End: 2024-01-22
Payer: MEDICARE

## 2024-01-22 VITALS
OXYGEN SATURATION: 99 % | DIASTOLIC BLOOD PRESSURE: 78 MMHG | TEMPERATURE: 97.6 F | HEIGHT: 71 IN | SYSTOLIC BLOOD PRESSURE: 134 MMHG | WEIGHT: 275.6 LBS | HEART RATE: 97 BPM | BODY MASS INDEX: 38.58 KG/M2

## 2024-01-22 DIAGNOSIS — E66.01 MORBID (SEVERE) OBESITY DUE TO EXCESS CALORIES (HCC): ICD-10-CM

## 2024-01-22 DIAGNOSIS — I10 PRIMARY HYPERTENSION: Primary | ICD-10-CM

## 2024-01-22 DIAGNOSIS — J45.30 MILD PERSISTENT REACTIVE AIRWAY DISEASE WITHOUT COMPLICATION: ICD-10-CM

## 2024-01-22 DIAGNOSIS — N18.30 STAGE 3 CHRONIC KIDNEY DISEASE, UNSPECIFIED WHETHER STAGE 3A OR 3B CKD (HCC): ICD-10-CM

## 2024-01-22 DIAGNOSIS — G47.09 OTHER INSOMNIA: ICD-10-CM

## 2024-01-22 DIAGNOSIS — E55.9 VITAMIN D DEFICIENCY: ICD-10-CM

## 2024-01-22 PROBLEM — J98.01 BRONCHOSPASM: Status: RESOLVED | Noted: 2023-06-25 | Resolved: 2024-01-22

## 2024-01-22 PROBLEM — J96.90 RESPIRATORY FAILURE, UNSPECIFIED CHRONICITY, UNSPECIFIED WHETHER WITH HYPOXIA OR HYPERCAPNIA (HCC): Status: ACTIVE | Noted: 2023-01-29

## 2024-01-22 PROBLEM — I87.311 CHRONIC VENOUS HYPERTENSION (IDIOPATHIC) WITH ULCER OF RIGHT LOWER EXTREMITY (CODE) (HCC): Status: ACTIVE | Noted: 2024-01-22

## 2024-01-22 PROBLEM — J40 BRONCHITIS: Status: RESOLVED | Noted: 2023-07-24 | Resolved: 2024-01-22

## 2024-01-22 PROBLEM — J96.90 RESPIRATORY FAILURE, UNSPECIFIED CHRONICITY, UNSPECIFIED WHETHER WITH HYPOXIA OR HYPERCAPNIA (HCC): Status: RESOLVED | Noted: 2023-01-29 | Resolved: 2024-01-22

## 2024-01-22 PROBLEM — E11.49 OTHER DIABETIC NEUROLOGICAL COMPLICATION ASSOCIATED WITH TYPE 2 DIABETES MELLITUS (HCC): Status: ACTIVE | Noted: 2024-01-22

## 2024-01-22 PROBLEM — L97.812: Status: ACTIVE | Noted: 2024-01-22

## 2024-01-22 PROBLEM — J31.0 CHRONIC RHINITIS: Status: RESOLVED | Noted: 2023-07-24 | Resolved: 2024-01-22

## 2024-01-22 PROBLEM — I87.2: Status: ACTIVE | Noted: 2024-01-22

## 2024-01-22 PROBLEM — E11.49 OTHER DIABETIC NEUROLOGICAL COMPLICATION ASSOCIATED WITH TYPE 2 DIABETES MELLITUS (HCC): Status: RESOLVED | Noted: 2024-01-22 | Resolved: 2024-01-22

## 2024-01-22 PROCEDURE — 99213 OFFICE O/P EST LOW 20 MIN: CPT | Performed by: PHYSICIAN ASSISTANT

## 2024-01-22 RX ORDER — ZOLPIDEM TARTRATE 10 MG/1
10 TABLET ORAL
Qty: 30 TABLET | Refills: 3 | Status: SHIPPED | OUTPATIENT
Start: 2024-01-22

## 2024-01-22 RX ORDER — ERGOCALCIFEROL 1.25 MG/1
50000 CAPSULE ORAL WEEKLY
Qty: 4 CAPSULE | Refills: 3 | Status: SHIPPED | OUTPATIENT
Start: 2024-01-22

## 2024-01-22 NOTE — PROGRESS NOTES
Name: Umang Morris      : 1950      MRN: [id number]  Encounter Provider: Eva Smart PA-C  Encounter Date: 2024   Encounter department: [facility]    Assessment & Plan     1. Vitamin D deficiency  -     ergocalciferol (VITAMIN D2) 50,000 units; Take 1 capsule (50,000 Units total) by mouth once a week    2. Other insomnia  -     zolpidem (AMBIEN) 10 mg tablet; Take 1 tablet (10 mg total) by mouth daily at bedtime as needed for sleep    3. Venous stasis ulcer of other part of right lower leg with fat layer exposed without varicose veins (HCC)    4. Chronic venous hypertension (idiopathic) with ulcer of right lower extremity (CODE) (HCC)    5. Other diabetic neurological complication associated with type 2 diabetes mellitus (HCC)    6. Morbid (severe) obesity due to excess calories (HCC)    7. Stage 3 chronic kidney disease, unspecified whether stage 3a or 3b CKD (HCC)    8. Respiratory failure, unspecified chronicity, unspecified whether with hypoxia or hypercapnia (HCC)        Depression Screening and Follow-up Plan: Patient was screened for depression during today's encounter. They screened negative with a PHQ-2 score of 0.        Subjective      Pt presents for routine visit. He is doing well overall. He had his labs done and these were reviewed with patient. All were stable except vitamin D was low. Will restart RX weekly replacement. BP is normal. He is up to date with AWV, CRC screening. His breathing has been stable with use of Airduo. Leg swelling remains controlled as well.       Review of Systems   Constitutional:  Negative for chills and fever.   HENT:  Negative for congestion, ear pain, hearing loss, postnasal drip, rhinorrhea, sinus pressure, sinus pain, sore throat and trouble swallowing.    Eyes:  Negative for pain and visual disturbance.   Respiratory:  Negative for cough, chest tightness, shortness of breath and wheezing.    Cardiovascular: Negative.   Negative for chest pain, palpitations and leg swelling.   Gastrointestinal:  Negative for abdominal pain, blood in stool, constipation, diarrhea, nausea and vomiting.   Endocrine: Negative for cold intolerance, heat intolerance, polydipsia, polyphagia and polyuria.   Genitourinary:  Negative for difficulty urinating, dysuria, flank pain and urgency.   Musculoskeletal:  Negative for arthralgias, back pain, gait problem and myalgias.   Skin:  Negative for rash.   Allergic/Immunologic: Negative.    Neurological:  Negative for dizziness, weakness, light-headedness and headaches.   Hematological: Negative.    Psychiatric/Behavioral:  Negative for behavioral problems, dysphoric mood and sleep disturbance. The patient is not nervous/anxious.        Current Outpatient Medications on File Prior to Visit   Medication Sig    albuterol (Ventolin HFA) 90 mcg/act inhaler Inhale 2 puffs every 4 (four) hours as needed for wheezing or shortness of breath    ALPRAZolam (XANAX) 0.5 mg tablet Take 1 tablet (0.5 mg total) by mouth 2 (two) times a day as needed for anxiety    aspirin 81 MG tablet Take 81 mg by mouth daily    azelastine (ASTELIN) 0.1 % nasal spray 1 spray into each nostril 2 (two) times a day    fluticasone (FLONASE) 50 mcg/act nasal spray 1 spray into each nostril daily    fluticasone-salmeterol (AirDuo RespiClick 232/14) 232-14 mcg/act dry powder inhaler Inhale 1 puff 2 (two) times a day Rinse mouth after use.    furosemide (LASIX) 40 mg tablet Take 1 tablet (40 mg total) by mouth daily as needed (swelling)    losartan (COZAAR) 100 MG tablet Take 1 tablet (100 mg total) by mouth daily    metoprolol succinate (Toprol XL) 25 mg 24 hr tablet Take 1 tablet (25 mg total) by mouth daily    montelukast (SINGULAIR) 10 mg tablet Take 1 tablet (10 mg total) by mouth daily at bedtime    pravastatin (PRAVACHOL) 40 mg tablet Take 1 tablet (40 mg total) by mouth daily    [DISCONTINUED] zolpidem (AMBIEN) 10 mg tablet Take 1 tablet (10  "mg total) by mouth daily at bedtime as needed for sleep    rOPINIRole (REQUIP) 2 mg tablet Take 1 tablet (2 mg total) by mouth daily at bedtime (Patient not taking: Reported on 11/7/2023)       Objective     /78   Pulse 97   Temp 97.6 °F (36.4 °C)   Ht 5' 11\" (1.803 m)   Wt 125 kg (275 lb 9.6 oz)   SpO2 99%   BMI 38.44 kg/m²     Physical Exam  Vitals and nursing note reviewed.   Constitutional:       General: He is not in acute distress.     Appearance: Normal appearance. He is well-developed. He is not diaphoretic.   HENT:      Head: Normocephalic and atraumatic.      Right Ear: External ear normal.      Left Ear: External ear normal.      Nose: Nose normal.      Mouth/Throat:      Pharynx: No oropharyngeal exudate.   Eyes:      General: No scleral icterus.        Right eye: No discharge.         Left eye: No discharge.      Conjunctiva/sclera: Conjunctivae normal.      Pupils: Pupils are equal, round, and reactive to light.   Neck:      Thyroid: No thyromegaly.   Cardiovascular:      Rate and Rhythm: Normal rate and regular rhythm.      Heart sounds: Normal heart sounds. No murmur heard.     No friction rub. No gallop.   Pulmonary:      Effort: Pulmonary effort is normal. No respiratory distress.      Breath sounds: Normal breath sounds. No wheezing or rales.   Abdominal:      General: Bowel sounds are normal. There is no distension.      Palpations: Abdomen is soft.      Tenderness: There is no abdominal tenderness.   Musculoskeletal:         General: No tenderness or deformity. Normal range of motion.      Cervical back: Normal range of motion and neck supple.   Skin:     General: Skin is warm and dry.   Neurological:      Mental Status: He is alert and oriented to person, place, and time.      Cranial Nerves: No cranial nerve deficit.   Psychiatric:         Behavior: Behavior normal.         Thought Content: Thought content normal.         Judgment: Judgment normal.       Eva Smart PA-C    "

## 2024-03-07 ENCOUNTER — OFFICE VISIT (OUTPATIENT)
Dept: PODIATRY | Facility: CLINIC | Age: 74
End: 2024-03-07
Payer: MEDICARE

## 2024-03-07 VITALS
WEIGHT: 287 LBS | SYSTOLIC BLOOD PRESSURE: 135 MMHG | HEART RATE: 83 BPM | DIASTOLIC BLOOD PRESSURE: 81 MMHG | BODY MASS INDEX: 40.03 KG/M2

## 2024-03-07 DIAGNOSIS — I73.9 PERIPHERAL ARTERIAL DISEASE (HCC): ICD-10-CM

## 2024-03-07 DIAGNOSIS — B35.1 ONYCHOMYCOSIS: Primary | ICD-10-CM

## 2024-03-07 DIAGNOSIS — L84 CALLUS: ICD-10-CM

## 2024-03-07 PROCEDURE — 11721 DEBRIDE NAIL 6 OR MORE: CPT | Performed by: PODIATRIST

## 2024-03-07 PROCEDURE — 11056 PARNG/CUTG B9 HYPRKR LES 2-4: CPT | Performed by: PODIATRIST

## 2024-03-07 NOTE — PROGRESS NOTES
Umang Morris  1950  AT RISK FOOT CARE    1. Onychomycosis        2. Callus        3. Peripheral arterial disease (HCC)            Patient presents for at-risk foot care.  Patient has no acute concerns today.  Patient has significant lower extremity risk due to diminished pulses in the feet and trophic skin changes to the lower extremity including thick toenail, atrophic skin, and decreased hair growth.      On exam patient has thickened, hypertrophic, discolored, brittle toenails with subungual debris and tenderness x10   Callus: 4  Patient has diminished pedal pulses and decreased perfusion to the lower extremities  Patient has significant trophic changes to the skin including thick toenails, decreased pedal hair and atrophic skin.     Today's treatment includes:  Debridement of toenails. Using nail nipper, jerica, and curette, nails were sharply debrided, reduced in thickness and length. Devitalized nail tissue and fungal debris excised and removed. Patient tolerated well.      Discussed proper shoe gear, daily inspections of feet, and general foot health with patient. Patient has Q8  findings and is recommended for at risk foot care every 9-10 weeks.      Procedure: All mycotic toenails were reduced and debrided in length, width, and girth using a nail nipper and dremel.  All hyperkeratotic skin lesion(s) were sharply pared with a scalpel with no bleeding or evidence of ulceration.  Patient tolerated procedure(s) well without complications.

## 2024-03-19 DIAGNOSIS — G47.09 OTHER INSOMNIA: ICD-10-CM

## 2024-03-19 DIAGNOSIS — R45.89 ANXIETY ABOUT HEALTH: ICD-10-CM

## 2024-03-19 RX ORDER — ALPRAZOLAM 0.5 MG/1
0.5 TABLET ORAL 2 TIMES DAILY PRN
Qty: 60 TABLET | Refills: 0 | Status: SHIPPED | OUTPATIENT
Start: 2024-03-19

## 2024-04-03 DIAGNOSIS — E78.5 HYPERLIPIDEMIA, UNSPECIFIED HYPERLIPIDEMIA TYPE: ICD-10-CM

## 2024-04-03 RX ORDER — PRAVASTATIN SODIUM 40 MG
40 TABLET ORAL DAILY
Qty: 90 TABLET | Refills: 0 | Status: SHIPPED | OUTPATIENT
Start: 2024-04-03

## 2024-04-22 ENCOUNTER — OFFICE VISIT (OUTPATIENT)
Dept: INTERNAL MEDICINE CLINIC | Facility: CLINIC | Age: 74
End: 2024-04-22
Payer: MEDICARE

## 2024-04-22 VITALS
OXYGEN SATURATION: 93 % | SYSTOLIC BLOOD PRESSURE: 118 MMHG | HEART RATE: 75 BPM | TEMPERATURE: 97.4 F | HEIGHT: 71 IN | WEIGHT: 274.4 LBS | BODY MASS INDEX: 38.42 KG/M2 | DIASTOLIC BLOOD PRESSURE: 78 MMHG

## 2024-04-22 DIAGNOSIS — I87.311 CHRONIC VENOUS HYPERTENSION (IDIOPATHIC) WITH ULCER OF RIGHT LOWER EXTREMITY (CODE) (HCC): ICD-10-CM

## 2024-04-22 DIAGNOSIS — G47.09 OTHER INSOMNIA: Primary | ICD-10-CM

## 2024-04-22 DIAGNOSIS — R73.01 ELEVATED FASTING GLUCOSE: ICD-10-CM

## 2024-04-22 DIAGNOSIS — I87.2 VENOUS STASIS ULCER OF OTHER PART OF RIGHT LOWER LEG WITH FAT LAYER EXPOSED WITHOUT VARICOSE VEINS (HCC): ICD-10-CM

## 2024-04-22 DIAGNOSIS — L97.812 VENOUS STASIS ULCER OF OTHER PART OF RIGHT LOWER LEG WITH FAT LAYER EXPOSED WITHOUT VARICOSE VEINS (HCC): ICD-10-CM

## 2024-04-22 LAB — SL AMB POCT HEMOGLOBIN AIC: 6 (ref ?–6.5)

## 2024-04-22 PROCEDURE — 99213 OFFICE O/P EST LOW 20 MIN: CPT | Performed by: PHYSICIAN ASSISTANT

## 2024-04-22 PROCEDURE — 83036 HEMOGLOBIN GLYCOSYLATED A1C: CPT | Performed by: PHYSICIAN ASSISTANT

## 2024-04-23 ENCOUNTER — TELEPHONE (OUTPATIENT)
Age: 74
End: 2024-04-23

## 2024-04-23 RX ORDER — ZALEPLON 10 MG/1
10 CAPSULE ORAL
Qty: 30 CAPSULE | Refills: 2 | Status: SHIPPED | OUTPATIENT
Start: 2024-04-23 | End: 2024-04-30

## 2024-04-23 NOTE — ASSESSMENT & PLAN NOTE
Will try Sonata. Directions for use and possible side effects discussed and patient verbalized understanding of these.

## 2024-04-23 NOTE — TELEPHONE ENCOUNTER
Patient called and stated that, thought a new prescription for Zoloft was to be sent into [facility] after appointment yesterday.  Patient checked with the pharmacy and nothing was sent in as of yet.  I am also not seeing it listed as an active mediation.  Please contact patient to advise when the medication is called into the pharmacy.

## 2024-04-23 NOTE — PROGRESS NOTES
Name: Umang Morris      : 1950      MRN: [id number]  Encounter Provider: Eva Smart PA-C  Encounter Date: 2024   Encounter department: [facility]    Assessment & Plan     1. Elevated fasting glucose  -     POCT hemoglobin A1c    2. Other insomnia  -     zaleplon (SONATA) 10 MG capsule; Take 1 capsule (10 mg total) by mouth daily at bedtime as needed for sleep    3. Venous stasis ulcer of other part of right lower leg with fat layer exposed without varicose veins (HCC)    4. Chronic venous hypertension (idiopathic) with ulcer of right lower extremity (CODE) (HCC)        Depression Screening and Follow-up Plan: Patient was screened for depression during today's encounter. They screened negative with a PHQ-2 score of 0.        Subjective      Pt presents for routine visit. He is up to date with labs, CRC screening and AWV. He is doing well overall but insomnia remains problematic. He tried trazodone, lunesta and ambien. He falls asleep but can't stay asleep.       Review of Systems   Constitutional:  Negative for chills and fever.   HENT:  Negative for congestion, ear pain, hearing loss, postnasal drip, rhinorrhea, sinus pressure, sinus pain, sore throat and trouble swallowing.    Eyes:  Negative for pain and visual disturbance.   Respiratory:  Negative for cough, chest tightness, shortness of breath and wheezing.    Cardiovascular: Negative.  Negative for chest pain, palpitations and leg swelling.   Gastrointestinal:  Negative for abdominal pain, blood in stool, constipation, diarrhea, nausea and vomiting.   Endocrine: Negative for cold intolerance, heat intolerance, polydipsia, polyphagia and polyuria.   Genitourinary:  Negative for difficulty urinating, dysuria, flank pain and urgency.   Musculoskeletal:  Negative for arthralgias, back pain, gait problem and myalgias.   Skin:  Negative for rash.   Allergic/Immunologic: Negative.    Neurological:  Negative for dizziness,  "weakness, light-headedness and headaches.   Hematological: Negative.    Psychiatric/Behavioral:  Positive for sleep disturbance. Negative for behavioral problems and dysphoric mood. The patient is not nervous/anxious.        Current Outpatient Medications on File Prior to Visit   Medication Sig    albuterol (Ventolin HFA) 90 mcg/act inhaler Inhale 2 puffs every 4 (four) hours as needed for wheezing or shortness of breath    ALPRAZolam (XANAX) 0.5 mg tablet Take 1 tablet by mouth twice daily as needed for anxiety    aspirin 81 MG tablet Take 81 mg by mouth daily    azelastine (ASTELIN) 0.1 % nasal spray 1 spray into each nostril 2 (two) times a day    ergocalciferol (VITAMIN D2) 50,000 units Take 1 capsule (50,000 Units total) by mouth once a week    fluticasone (FLONASE) 50 mcg/act nasal spray 1 spray into each nostril daily    fluticasone-salmeterol (AirDuo RespiClick 232/14) 232-14 mcg/act dry powder inhaler Inhale 1 puff 2 (two) times a day Rinse mouth after use.    furosemide (LASIX) 40 mg tablet Take 1 tablet (40 mg total) by mouth daily as needed (swelling)    losartan (COZAAR) 100 MG tablet Take 1 tablet (100 mg total) by mouth daily    metoprolol succinate (Toprol XL) 25 mg 24 hr tablet Take 1 tablet (25 mg total) by mouth daily    montelukast (SINGULAIR) 10 mg tablet Take 1 tablet (10 mg total) by mouth daily at bedtime    pravastatin (PRAVACHOL) 40 mg tablet Take 1 tablet (40 mg total) by mouth daily    rOPINIRole (REQUIP) 2 mg tablet Take 1 tablet (2 mg total) by mouth daily at bedtime (Patient not taking: Reported on 11/7/2023)       Objective     /78   Pulse 75   Temp (!) 97.4 °F (36.3 °C)   Ht 5' 11\" (1.803 m)   Wt 124 kg (274 lb 6.4 oz)   SpO2 93%   BMI 38.27 kg/m²     Physical Exam  Vitals and nursing note reviewed.   Constitutional:       General: He is not in acute distress.     Appearance: Normal appearance. He is well-developed. He is not diaphoretic.   HENT:      Head: Normocephalic " and atraumatic.      Right Ear: External ear normal.      Left Ear: External ear normal.      Nose: Nose normal.      Mouth/Throat:      Pharynx: No oropharyngeal exudate.   Eyes:      General: No scleral icterus.        Right eye: No discharge.         Left eye: No discharge.      Conjunctiva/sclera: Conjunctivae normal.      Pupils: Pupils are equal, round, and reactive to light.   Neck:      Thyroid: No thyromegaly.   Cardiovascular:      Rate and Rhythm: Normal rate and regular rhythm.      Heart sounds: Normal heart sounds. No murmur heard.     No friction rub. No gallop.   Pulmonary:      Effort: Pulmonary effort is normal. No respiratory distress.      Breath sounds: Normal breath sounds. No wheezing or rales.   Abdominal:      General: Bowel sounds are normal. There is no distension.      Palpations: Abdomen is soft.      Tenderness: There is no abdominal tenderness.   Musculoskeletal:         General: No tenderness or deformity. Normal range of motion.      Cervical back: Normal range of motion and neck supple.   Skin:     General: Skin is warm and dry.   Neurological:      Mental Status: He is alert and oriented to person, place, and time.      Cranial Nerves: No cranial nerve deficit.   Psychiatric:         Behavior: Behavior normal.         Thought Content: Thought content normal.         Judgment: Judgment normal.       Eva Smart PA-C

## 2024-04-23 NOTE — TELEPHONE ENCOUNTER
Pharmacy calling to verify if patient is supposed to be stopping zolpidem and starting zaleplon or not.    Confirmed per doctor's last message and after visit summary that he is in fact supposed to stop taking the zolpidem and start taking the zaleplon.    Contact pharmacy for further questions.  [phone]

## 2024-04-24 ENCOUNTER — TELEPHONE (OUTPATIENT)
Dept: INTERNAL MEDICINE CLINIC | Facility: CLINIC | Age: 74
End: 2024-04-24

## 2024-04-24 NOTE — TELEPHONE ENCOUNTER
PA for zaleplon (SONATA) 10 MG capsule     Submitted via    [x]CMM-KEY [id number]  []Surescripts-Case ID #   []Faxed to plan   []Other website   []Phone call Case ID #     Office notes sent, clinical questions answered. Awaiting determination    Turnaround time for your insurance to make a decision on your Prior Authorization can take 7-21 business days.

## 2024-04-24 NOTE — TELEPHONE ENCOUNTER
Prior auth from Cover my meds from [facility] needed for Zaleplon 10 mg capsules, scanned to chart with all information.   KEY: [id number]  Last Name: Alexandra  :  1950

## 2024-04-25 NOTE — TELEPHONE ENCOUNTER
PA for zaleplon (SONATA) 10 MG capsule  Denied    Reason:              Message sent to provider pool Yes    Denial letter scanned into Media Yes    Appeal started No    (Provider will need to decide if appeal is warranted and send clinical documentation to PA team for initiation.)

## 2024-04-30 DIAGNOSIS — G47.09 OTHER INSOMNIA: Primary | ICD-10-CM

## 2024-04-30 DIAGNOSIS — I10 PRIMARY HYPERTENSION: ICD-10-CM

## 2024-04-30 RX ORDER — FUROSEMIDE 40 MG/1
40 TABLET ORAL DAILY PRN
Qty: 90 TABLET | Refills: 1 | Status: SHIPPED | OUTPATIENT
Start: 2024-04-30

## 2024-04-30 RX ORDER — ZOLPIDEM TARTRATE 10 MG/1
10 TABLET ORAL
Qty: 30 TABLET | Refills: 0 | Status: SHIPPED | OUTPATIENT
Start: 2024-04-30

## 2024-04-30 NOTE — TELEPHONE ENCOUNTER
Patient called the RX Refill Line. Message is being forwarded to the office.     Patient called to request a refill on a script, during the call he was as questioning about the alternative script that would be sent in, in place of the ambien. Advised patient a prior authorization was sent to the insurance for the prescription and the auth was denied. Patient states he would like to stay on the ambien, does not need a refill at this time but will call when a refill is needed.       Please contact patient at [phone]

## 2024-04-30 NOTE — TELEPHONE ENCOUNTER
Reason for call:   [x] Refill   [] Prior Auth  [] Other:     Office:   [x] PCP/Provider -   [] Specialty/Provider -     Medication: furosemide (LASIX) 40 mg tablet     Dose/Frequency:   40 mg, Oral, Daily PRN    Quantity: 90    Pharmacy: [facility] [address] [place] [address] [id number]     Does the patient have enough for 3 days?   [] Yes   [x] No - Send as HP to POD

## 2024-05-21 DIAGNOSIS — G47.09 OTHER INSOMNIA: ICD-10-CM

## 2024-05-22 ENCOUNTER — OFFICE VISIT (OUTPATIENT)
Dept: PODIATRY | Facility: CLINIC | Age: 74
End: 2024-05-22
Payer: MEDICARE

## 2024-05-22 VITALS
BODY MASS INDEX: 38.22 KG/M2 | DIASTOLIC BLOOD PRESSURE: 71 MMHG | HEART RATE: 92 BPM | WEIGHT: 274 LBS | SYSTOLIC BLOOD PRESSURE: 125 MMHG

## 2024-05-22 DIAGNOSIS — B35.1 ONYCHOMYCOSIS: ICD-10-CM

## 2024-05-22 DIAGNOSIS — L84 CALLUS: Primary | ICD-10-CM

## 2024-05-22 DIAGNOSIS — I73.9 PERIPHERAL ARTERIAL DISEASE (HCC): ICD-10-CM

## 2024-05-22 DIAGNOSIS — R60.0 LOCALIZED EDEMA: ICD-10-CM

## 2024-05-22 PROCEDURE — 11056 PARNG/CUTG B9 HYPRKR LES 2-4: CPT | Performed by: PODIATRIST

## 2024-05-22 PROCEDURE — 11721 DEBRIDE NAIL 6 OR MORE: CPT | Performed by: PODIATRIST

## 2024-05-22 PROCEDURE — 99213 OFFICE O/P EST LOW 20 MIN: CPT | Performed by: PODIATRIST

## 2024-05-22 RX ORDER — ZOLPIDEM TARTRATE 10 MG/1
10 TABLET ORAL
Qty: 30 TABLET | Refills: 0 | Status: SHIPPED | OUTPATIENT
Start: 2024-05-22

## 2024-05-22 NOTE — TELEPHONE ENCOUNTER
Patient called the RX Refill Line. Message is being forwarded to the office.     Patient was checking on status of refill of Ambien. Would like someone to call him when it is sent to the pharmacy or if there are any issues.     Please contact patient at [phone]

## 2024-05-22 NOTE — PROGRESS NOTES
Ambulatory Visit  Name: Umang Morris      : 1950      MRN: [id number]  Encounter Provider: Jas Osborn DPM  Encounter Date: 2024   Encounter department: [facility] [place]    Assessment & Plan   1. Callus  2. Onychomycosis  3. Peripheral arterial disease (HCC)  4. Localized edema    -I did discuss eventual lymphedema pumps, he has significant difficulty utilizing the compression stockings and based off of the swelling he is at high risk for reulceration.  - At risk foot pick provided as below  - We discussed at length lifestyle modifications as below    We discussed at length lifestyle modifications for venous insufficiency and lymphedema. We recommend to not sleep in a recliner. To use lymphedema pumps on a compliant basis if available. To avoid periods of sitting with legs in a dependent position. To elevate legs and lay flat for periods in the day and at night. To take diuretics as directed. Also recommend weight loss, increased ambulation, increased activity, and monitor diet especially sodium intake. To use compression stockings.         Procedure: All mycotic toenails were reduced and debrided in length, width, and girth using a nail nipper and dremel.  All hyperkeratotic skin lesion(s) were sharply pared with a scalpel with no bleeding or evidence of ulceration.  Patient tolerated procedure(s) well without complications.  64006, 78704, q9          History of Present Illness     Umang Morris is a 73 y.o. male who presents for evaluation management bilateral feet, he states that his feet and legs are not more swollen than normal, but they appear to be a little bit more swollen and red than his baseline.  History of venous stasis ulceration but has difficulty wearing compression stockings.  He cannot pull them up by himself, they are too tight for his hands    Review of Systems   Constitutional:  Negative for chills and fever.   HENT:  Negative for ear pain  and sore throat.    Eyes:  Negative for pain and visual disturbance.   Respiratory:  Negative for cough and shortness of breath.    Cardiovascular:  Negative for chest pain and palpitations.   Gastrointestinal:  Negative for abdominal pain and vomiting.   Genitourinary:  Negative for dysuria and hematuria.   Musculoskeletal:  Negative for arthralgias and back pain.   Skin:  Negative for color change and rash.   Neurological:  Negative for seizures and syncope.   All other systems reviewed and are negative.      Objective     /71 (BP Location: Right arm, Patient Position: Sitting, Cuff Size: Large)   Pulse 92   Wt 124 kg (274 lb) Comment: Patient reported  BMI 38.22 kg/m²     Physical Exam  Vitals reviewed.   Constitutional:       Appearance: Normal appearance. He is obese.   HENT:      Head: Normocephalic and atraumatic.      Nose: Nose normal.      Mouth/Throat:      Mouth: Mucous membranes are moist.   Pulmonary:      Effort: Pulmonary effort is normal.   Abdominal:      General: Abdomen is flat.   Skin:     Capillary Refill: Capillary refill takes 2 to 3 seconds.      Comments: There is plus 3 out of 4 pitting edema bilateral lower extremities feet and distal one third of the legs, erythema but not cellulitic in nature, seems to be more irritated, blanchable slightly warm chronic deformities remain  - Nails 1 through 5 bilaterally thickened elongated, callosities subfirst metatarsal head bilaterally.   Neurological:      General: No focal deficit present.      Mental Status: He is alert and oriented to person, place, and time. Mental status is at baseline.       Administrative Statements

## 2024-06-20 DIAGNOSIS — G47.09 OTHER INSOMNIA: ICD-10-CM

## 2024-06-20 RX ORDER — ZOLPIDEM TARTRATE 10 MG/1
10 TABLET ORAL
Qty: 30 TABLET | Refills: 2 | Status: SHIPPED | OUTPATIENT
Start: 2024-06-20

## 2024-06-20 NOTE — TELEPHONE ENCOUNTER
Reason for call:   [x] Refill   [] Prior Auth  [] Other:     Office:   [x] PCP/Provider - Eva Smart PA-C   [] Specialty/Provider -     Medication: zolpidem (AMBIEN) 10 mg tablet     Dose/Frequency: Take 1 tablet (10 mg total) by mouth daily at bedtime as needed for sleep     Quantity: 30    Pharmacy: [facility] [address] [place] [address] [address] [phone]     Does the patient have enough for 3 days?   [] Yes   [x] No - Send as HP to POD

## 2024-06-24 ENCOUNTER — OFFICE VISIT (OUTPATIENT)
Dept: INTERNAL MEDICINE CLINIC | Facility: CLINIC | Age: 74
End: 2024-06-24
Payer: MEDICARE

## 2024-06-24 VITALS
BODY MASS INDEX: 37.8 KG/M2 | TEMPERATURE: 98.7 F | OXYGEN SATURATION: 98 % | DIASTOLIC BLOOD PRESSURE: 80 MMHG | SYSTOLIC BLOOD PRESSURE: 120 MMHG | WEIGHT: 270 LBS | HEIGHT: 71 IN | HEART RATE: 74 BPM

## 2024-06-24 DIAGNOSIS — R73.01 ELEVATED FASTING GLUCOSE: ICD-10-CM

## 2024-06-24 DIAGNOSIS — E55.9 VITAMIN D DEFICIENCY: ICD-10-CM

## 2024-06-24 DIAGNOSIS — Z00.00 MEDICARE ANNUAL WELLNESS VISIT, SUBSEQUENT: Primary | ICD-10-CM

## 2024-06-24 LAB — SL AMB POCT HEMOGLOBIN AIC: 6 (ref ?–6.5)

## 2024-06-24 PROCEDURE — 83036 HEMOGLOBIN GLYCOSYLATED A1C: CPT | Performed by: PHYSICIAN ASSISTANT

## 2024-06-24 PROCEDURE — G0439 PPPS, SUBSEQ VISIT: HCPCS | Performed by: PHYSICIAN ASSISTANT

## 2024-06-24 RX ORDER — ERGOCALCIFEROL 1.25 MG/1
50000 CAPSULE ORAL WEEKLY
Qty: 4 CAPSULE | Refills: 3 | Status: SHIPPED | OUTPATIENT
Start: 2024-06-24

## 2024-06-24 NOTE — PROGRESS NOTES
Ambulatory Visit  Name: Umang Morris      : 1950      MRN: [id number]  Encounter Provider: Eva Smart PA-C  Encounter Date: 2024   Encounter department: [facility]    Assessment & Plan   1. Medicare annual wellness visit, subsequent  2. Elevated fasting glucose  -     POCT hemoglobin A1c  3. Vitamin D deficiency  -     ergocalciferol (VITAMIN D2) 50,000 units; Take 1 capsule (50,000 Units total) by mouth once a week       Preventive health issues were discussed with patient, and age appropriate screening tests were ordered as noted in patient's After Visit Summary. Personalized health advice and appropriate referrals for health education or preventive services given if needed, as noted in patient's After Visit Summary.    History of Present Illness     HPI   Patient Care Team:  Eva Smart PA-C as PCP - General (Internal Medicine)  Kael Hughes MD (Pulmonary Disease)  MARIA ANTONIA Keller as Nurse Practitioner (Pulmonology)    Review of Systems   Constitutional:  Negative for chills and fever.   HENT:  Negative for congestion, ear pain, hearing loss, postnasal drip, rhinorrhea, sinus pressure, sinus pain, sore throat and trouble swallowing.    Eyes:  Negative for pain and visual disturbance.   Respiratory:  Negative for cough, chest tightness, shortness of breath and wheezing.    Cardiovascular: Negative.  Negative for chest pain, palpitations and leg swelling.   Gastrointestinal:  Negative for abdominal pain, blood in stool, constipation, diarrhea, nausea and vomiting.   Endocrine: Negative for cold intolerance, heat intolerance, polydipsia, polyphagia and polyuria.   Genitourinary:  Negative for difficulty urinating, dysuria, flank pain and urgency.   Musculoskeletal:  Negative for arthralgias, back pain, gait problem and myalgias.   Skin:  Negative for rash.   Allergic/Immunologic: Negative.    Neurological:  Negative for dizziness, weakness,  light-headedness and headaches.   Hematological: Negative.    Psychiatric/Behavioral:  Negative for behavioral problems, dysphoric mood and sleep disturbance. The patient is not nervous/anxious.      Medical History Reviewed by provider this encounter:       Annual Wellness Visit Questionnaire   Umang is here for his Subsequent Wellness visit. Last Medicare Wellness visit information reviewed, patient interviewed and updates made to the record today.      Health Risk Assessment:   Patient rates overall health as good. Patient feels that their physical health rating is same. Patient is satisfied with their life. Eyesight was rated as same. Hearing was rated as same. Patient feels that their emotional and mental health rating is same. Patients states they are never, rarely angry. Patient states they are never, rarely unusually tired/fatigued. Pain experienced in the last 7 days has been none. Patient states that he has experienced no weight loss or gain in last 6 months.     Fall Risk Screening:   In the past year, patient has experienced: no history of falling in past year      Home Safety:  Patient does not have trouble with stairs inside or outside of their home. Home safety hazards include: none.     Nutrition:   Current diet is Diabetic.     Medications:   Patient is currently taking over-the-counter supplements. OTC medications include: see medication list. Patient is able to manage medications.     Activities of Daily Living (ADLs)/Instrumental Activities of Daily Living (IADLs):   Walk and transfer into and out of bed and chair?: Yes  Dress and groom yourself?: Yes    Bathe or shower yourself?: Yes    Feed yourself? Yes  Do your laundry/housekeeping?: Yes  Manage your money, pay your bills and track your expenses?: Yes  Make your own meals?: Yes    Do your own shopping?: Yes    Previous Hospitalizations:   Any hospitalizations or ED visits within the last 12 months?: No      Advance Care Planning:   Living  will: Yes    Durable POA for healthcare: Yes    Advanced directive: Yes    Advanced directive counseling given: Yes    Five wishes given: No    Patient declined ACP directive: No    End of Life Decisions reviewed with patient: Yes    Provider agrees with end of life decisions: Yes      Cognitive Screening:   Provider or family/friend/caregiver concerned regarding cognition?: No    PREVENTIVE SCREENINGS      Cardiovascular Screening:    General: Screening Not Indicated and History Lipid Disorder      Diabetes Screening:     General: Screening Not Indicated and History Diabetes      Colorectal Cancer Screening:     General: Screening Current      Prostate Cancer Screening:    General: Screening Not Indicated      Osteoporosis Screening:    General: Screening Not Indicated      Abdominal Aortic Aneurysm (AAA) Screening:    Risk factors include: age between 65-74 yo and tobacco use        Lung Cancer Screening:     General: Screening Not Indicated      Hepatitis C Screening:    General: Screening Current    Screening, Brief Intervention, and Referral to Treatment (SBIRT)    Screening  Typical number of drinks in a day: 0  Typical number of drinks in a week: 0  Interpretation: Low risk drinking behavior.    Single Item Drug Screening:  How often have you used an illegal drug (including marijuana) or a prescription medication for non-medical reasons in the past year? never    Single Item Drug Screen Score: 0  Interpretation: Negative screen for possible drug use disorder    Social Determinants of Health     Financial Resource Strain: Low Risk  (6/16/2023)    Overall Financial Resource Strain (CARDIA)     Difficulty of Paying Living Expenses: Not very hard   Food Insecurity: No Food Insecurity (6/24/2024)    Hunger Vital Sign     Worried About Running Out of Food in the Last Year: Never true     Ran Out of Food in the Last Year: Never true   Transportation Needs: No Transportation Needs (6/24/2024)    PRAPARE -  "Transportation     Lack of Transportation (Medical): No     Lack of Transportation (Non-Medical): No   Housing Stability: Low Risk  (6/24/2024)    Housing Stability Vital Sign     Unable to Pay for Housing in the Last Year: No     Number of Times Moved in the Last Year: 0     Homeless in the Last Year: No   Utilities: Not At Risk (6/24/2024)    [facility] Utilities     Threatened with loss of utilities: No     No results found.    Objective     /80   Pulse 74   Temp 98.7 °F (37.1 °C)   Ht 5' 11\" (1.803 m)   Wt 122 kg (270 lb)   SpO2 98%   BMI 37.66 kg/m²     Physical Exam  Vitals and nursing note reviewed.   Constitutional:       General: He is not in acute distress.     Appearance: Normal appearance. He is well-developed. He is not diaphoretic.   HENT:      Head: Normocephalic and atraumatic.      Right Ear: External ear normal.      Left Ear: External ear normal.      Nose: Nose normal.      Mouth/Throat:      Pharynx: No oropharyngeal exudate.   Eyes:      General: No scleral icterus.        Right eye: No discharge.         Left eye: No discharge.      Conjunctiva/sclera: Conjunctivae normal.      Pupils: Pupils are equal, round, and reactive to light.   Neck:      Thyroid: No thyromegaly.   Cardiovascular:      Rate and Rhythm: Normal rate and regular rhythm.      Heart sounds: Normal heart sounds. No murmur heard.     No friction rub. No gallop.   Pulmonary:      Effort: Pulmonary effort is normal. No respiratory distress.      Breath sounds: Normal breath sounds. No wheezing or rales.   Abdominal:      General: Bowel sounds are normal. There is no distension.      Palpations: Abdomen is soft.      Tenderness: There is no abdominal tenderness.   Musculoskeletal:         General: No tenderness or deformity. Normal range of motion.      Cervical back: Normal range of motion and neck supple.   Skin:     General: Skin is warm and dry.   Neurological:      Mental Status: He is alert and oriented to person, " place, and time.      Cranial Nerves: No cranial nerve deficit.   Psychiatric:         Behavior: Behavior normal.         Thought Content: Thought content normal.         Judgment: Judgment normal.       Administrative Statements

## 2024-06-30 DIAGNOSIS — E78.5 HYPERLIPIDEMIA, UNSPECIFIED HYPERLIPIDEMIA TYPE: ICD-10-CM

## 2024-06-30 DIAGNOSIS — I10 PRIMARY HYPERTENSION: ICD-10-CM

## 2024-06-30 RX ORDER — PRAVASTATIN SODIUM 40 MG
40 TABLET ORAL DAILY
Qty: 7 TABLET | Refills: 0 | Status: SHIPPED | OUTPATIENT
Start: 2024-06-30 | End: 2024-06-30 | Stop reason: SDUPTHER

## 2024-06-30 RX ORDER — PRAVASTATIN SODIUM 40 MG
40 TABLET ORAL DAILY
Qty: 90 TABLET | Refills: 1 | Status: SHIPPED | OUTPATIENT
Start: 2024-06-30 | End: 2024-07-07

## 2024-06-30 RX ORDER — LOSARTAN POTASSIUM 100 MG/1
100 TABLET ORAL DAILY
Qty: 7 TABLET | Refills: 0 | Status: SHIPPED | OUTPATIENT
Start: 2024-06-30 | End: 2024-06-30 | Stop reason: SDUPTHER

## 2024-06-30 RX ORDER — LOSARTAN POTASSIUM 100 MG/1
100 TABLET ORAL DAILY
Qty: 90 TABLET | Refills: 1 | Status: SHIPPED | OUTPATIENT
Start: 2024-06-30 | End: 2024-07-07

## 2024-07-18 DIAGNOSIS — I10 PRIMARY HYPERTENSION: ICD-10-CM

## 2024-07-19 RX ORDER — METOPROLOL SUCCINATE 25 MG/1
25 TABLET, EXTENDED RELEASE ORAL DAILY
Qty: 100 TABLET | Refills: 1 | Status: SHIPPED | OUTPATIENT
Start: 2024-07-19

## 2024-08-13 ENCOUNTER — TELEPHONE (OUTPATIENT)
Dept: PODIATRY | Facility: CLINIC | Age: 74
End: 2024-08-13

## 2024-08-22 DIAGNOSIS — J34.3 HYPERTROPHY OF BOTH INFERIOR NASAL TURBINATES: ICD-10-CM

## 2024-08-22 RX ORDER — AZELASTINE HYDROCHLORIDE 137 UG/1
SPRAY, METERED NASAL
Qty: 30 ML | Refills: 5 | Status: SHIPPED | OUTPATIENT
Start: 2024-08-22

## 2024-08-22 NOTE — TELEPHONE ENCOUNTER
Pt calling in for refill, states he has no refills left per pharmacy  
I have reviewed and confirmed nurses' notes for patient's medications, allergies, medical history, and surgical history.

## 2024-09-03 ENCOUNTER — OFFICE VISIT (OUTPATIENT)
Dept: PODIATRY | Facility: CLINIC | Age: 74
End: 2024-09-03
Payer: MEDICARE

## 2024-09-03 VITALS
BODY MASS INDEX: 38.36 KG/M2 | WEIGHT: 274 LBS | HEIGHT: 71 IN | DIASTOLIC BLOOD PRESSURE: 63 MMHG | HEART RATE: 97 BPM | SYSTOLIC BLOOD PRESSURE: 142 MMHG

## 2024-09-03 DIAGNOSIS — L85.3 XEROSIS OF SKIN: Primary | ICD-10-CM

## 2024-09-03 DIAGNOSIS — B35.1 ONYCHOMYCOSIS: ICD-10-CM

## 2024-09-03 DIAGNOSIS — I73.9 PERIPHERAL ARTERIAL DISEASE (HCC): ICD-10-CM

## 2024-09-03 PROCEDURE — 99212 OFFICE O/P EST SF 10 MIN: CPT | Performed by: PODIATRIST

## 2024-09-03 NOTE — PROGRESS NOTES
Ambulatory Visit  Name: Umang Morris      : 1950      MRN: [id number]  Encounter Provider: Garry Terrell DPM  Encounter Date: 9/3/2024   Encounter department: [facility] PODIATRY [place]    Assessment & Plan   1. Xerosis of skin  2. Onychomycosis  3. Peripheral arterial disease (HCC)    Debride mycotic nails and thin the nail plates with the use of a nail nipper manually and the nails were smoothed and thinned out using an electric Dremel bur.  Patient tolerated the procedure well    The dry skin that was located on both feet was sharply debrided debrided with a 312 blade followed by electric Dremel bur to smooth the rest of the skin down take any sharp edges and flaky skin off.  It was strongly encouraged for the patient to use some type of thick style of lotion such as CeraVe, Cetaphil, utter cream, or similar product.  He was told to use it daily on both feet.  For the left he was to apply it in the evening wrap it with Saran wrap put a sock on it and go to bed then in the morning remove the sock and Saran wrap and continue that process for several weeks    Return in about 11 weeks (around 2024).   History of Present Illness     Umang Morris is a 73 y.o. male who presents with chief complaint of painful thick nails on both feet and painful dry skin on his left heel.  Patient admits to using lotion in the past on his heels.    Review of Systems  Medical History Reviewed by provider this encounter:       Current Outpatient Medications on File Prior to Visit   Medication Sig Dispense Refill    albuterol (Ventolin HFA) 90 mcg/act inhaler Inhale 2 puffs every 4 (four) hours as needed for wheezing or shortness of breath 18 g 2    ALPRAZolam (XANAX) 0.5 mg tablet Take 1 tablet by mouth twice daily as needed for anxiety 60 tablet 0    aspirin 81 MG tablet Take 81 mg by mouth daily      Azelastine HCl 137 MCG/SPRAY SOLN Use 1 spray(s) in each nostril twice daily 30 mL 5     "ergocalciferol (VITAMIN D2) 50,000 units Take 1 capsule (50,000 Units total) by mouth once a week 4 capsule 3    fluticasone (FLONASE) 50 mcg/act nasal spray 1 spray into each nostril daily      fluticasone-salmeterol (AirDuo RespiClick 232/14) 232-14 mcg/act dry powder inhaler Inhale 1 puff 2 (two) times a day Rinse mouth after use. 1 each 3    furosemide (LASIX) 40 mg tablet Take 1 tablet (40 mg total) by mouth daily as needed (swelling) 90 tablet 1    metoprolol succinate (TOPROL-XL) 25 mg 24 hr tablet Take 1 tablet by mouth once daily 100 tablet 1    zolpidem (AMBIEN) 10 mg tablet Take 1 tablet (10 mg total) by mouth daily at bedtime as needed for sleep 30 tablet 2    losartan (COZAAR) 100 MG tablet Take 1 tablet (100 mg total) by mouth daily for 7 days 90 tablet 1    pravastatin (PRAVACHOL) 40 mg tablet Take 1 tablet (40 mg total) by mouth daily for 7 days 90 tablet 1     No current facility-administered medications on file prior to visit.      Objective     /63   Pulse 97   Ht 5' 11\" (1.803 m)   Wt 124 kg (274 lb)   BMI 38.22 kg/m²     Physical Exam  Feet:      Right foot:      Protective Sensation: 4 sites tested.  4 sites sensed.      Left foot:      Protective Sensation: 4 sites tested.  4 sites sensed.     Vascular status was a faint 1/4 DP PT negative digital hair, normal distal, cooling, slightly delayed capillary refill of around 3 seconds bilaterally    Derm nails are brittle elongated hypertrophic yellow-white discoloration with subungual debris.  There is an increased thickness in the nails of approximately 4 mm.  Hypertrophic tissue is present on the plantar medial aspect of the first MPJ on the right foot with no fissures noted.  There is a large amount of hypertrophic tissue and scaly tissue present on the plantar medial aspect of the left foot with fissures noted within the hypertrophic tissue but not into the regular skin.  No signs of any infection and no dried blood present within " the tissue.    Ortho hammertoe deformities are present on the fifth digits bilaterally.  There is a mild hallux valgus deformity with enlargement of the first metatarsal head on the right foot.    Neuro light touch is intact bilaterally.  0.5 monofilament test was performed on the plantar aspect of the first digit, plantar aspect of the third and fourth toes, submet 3, and the plantar aspect of the arch bilaterally patient was able to feel all the sites    Administrative Statements   I have spent a total time of 15 minutes in caring for this patient on the day of the visit/encounter including Instructions for management, Patient and family education, Counseling / Coordination of care, Documenting in the medical record, Reviewing / ordering tests, medicine, procedures  , and Obtaining or reviewing history  .

## 2024-09-25 DIAGNOSIS — G47.09 OTHER INSOMNIA: ICD-10-CM

## 2024-09-25 RX ORDER — ZOLPIDEM TARTRATE 10 MG/1
10 TABLET ORAL
Qty: 30 TABLET | Refills: 0 | Status: SHIPPED | OUTPATIENT
Start: 2024-09-25

## 2024-09-25 NOTE — TELEPHONE ENCOUNTER
Reason for call:   [x] Refill   [] Prior Auth  [] Other:     Office:   [x] PCP/Provider - Eva Smart PA-C   [] Specialty/Provider -     Medication:     zolpidem (AMBIEN) 10 mg tablet       Dose/Frequency:      Take 1 tablet (10 mg total) by mouth daily at bedtime as needed for sleep                      Quantity: 30    Pharmacy: [facility] [address] [place] [address] [id number] [phone]     Does the patient have enough for 3 days?   [] Yes   [x] No - Send as HP to POD

## 2024-10-18 ENCOUNTER — RA CDI HCC (OUTPATIENT)
Dept: OTHER | Facility: HOSPITAL | Age: 74
End: 2024-10-18

## 2024-10-24 ENCOUNTER — OFFICE VISIT (OUTPATIENT)
Dept: INTERNAL MEDICINE CLINIC | Facility: CLINIC | Age: 74
End: 2024-10-24
Payer: MEDICARE

## 2024-10-24 VITALS
HEIGHT: 71 IN | WEIGHT: 273 LBS | HEART RATE: 94 BPM | TEMPERATURE: 97.4 F | DIASTOLIC BLOOD PRESSURE: 76 MMHG | OXYGEN SATURATION: 94 % | BODY MASS INDEX: 38.22 KG/M2 | SYSTOLIC BLOOD PRESSURE: 144 MMHG

## 2024-10-24 DIAGNOSIS — R73.03 PRE-DIABETES: ICD-10-CM

## 2024-10-24 DIAGNOSIS — I10 PRIMARY HYPERTENSION: ICD-10-CM

## 2024-10-24 DIAGNOSIS — J30.2 SEASONAL ALLERGIC RHINITIS, UNSPECIFIED TRIGGER: ICD-10-CM

## 2024-10-24 DIAGNOSIS — Z23 ENCOUNTER FOR IMMUNIZATION: ICD-10-CM

## 2024-10-24 DIAGNOSIS — G47.09 OTHER INSOMNIA: Primary | ICD-10-CM

## 2024-10-24 PROBLEM — J30.9 ALLERGIC RHINITIS: Status: ACTIVE | Noted: 2024-10-24

## 2024-10-24 LAB — SL AMB POCT HEMOGLOBIN AIC: 6.1 (ref ?–6.5)

## 2024-10-24 PROCEDURE — 83036 HEMOGLOBIN GLYCOSYLATED A1C: CPT | Performed by: PHYSICIAN ASSISTANT

## 2024-10-24 PROCEDURE — 99214 OFFICE O/P EST MOD 30 MIN: CPT | Performed by: PHYSICIAN ASSISTANT

## 2024-10-24 PROCEDURE — 90662 IIV NO PRSV INCREASED AG IM: CPT

## 2024-10-24 PROCEDURE — G0008 ADMIN INFLUENZA VIRUS VAC: HCPCS

## 2024-10-24 RX ORDER — TRAZODONE HYDROCHLORIDE 50 MG/1
50-100 TABLET, FILM COATED ORAL
Qty: 60 TABLET | Refills: 0 | Status: SHIPPED | OUTPATIENT
Start: 2024-10-24

## 2024-10-24 RX ORDER — ZOLPIDEM TARTRATE 10 MG/1
10 TABLET ORAL
Qty: 30 TABLET | Refills: 0 | Status: SHIPPED | OUTPATIENT
Start: 2024-10-24

## 2024-10-24 NOTE — ASSESSMENT & PLAN NOTE
Patient expressed rhinitis. Mentioned the use of Claritin previously with benefits. Dicussed adding it back and patient agreeable.

## 2024-10-24 NOTE — ASSESSMENT & PLAN NOTE
Patient currently taking Ambien with minimal benefit. Discussed adding Trazodone to aid with sleeping. Patient agreeable at this time. Will add Trazodone to treatment.      Orders:    traZODone (DESYREL) 50 mg tablet; Take 1-2 tablets ( mg total) by mouth daily at bedtime    zolpidem (AMBIEN) 10 mg tablet; Take 1 tablet (10 mg total) by mouth daily at bedtime as needed for sleep

## 2024-10-24 NOTE — PROGRESS NOTES
Ambulatory Visit  Name: Umang Morris      : 1950      MRN: [id number]  Encounter Provider: Eva Smart PA-C  Encounter Date: 10/24/2024   Encounter department: [facility]    Assessment & Plan  Encounter for immunization    Orders:    influenza vaccine, high-dose, PF 0.5 mL (Fluzone High Dose)    Pre-diabetes    Orders:    POCT hemoglobin A1c    Other insomnia  Patient currently taking Ambien with minimal benefit. Discussed adding Trazodone to aid with sleeping. Patient agreeable at this time. Will add Trazodone to treatment.      Orders:    traZODone (DESYREL) 50 mg tablet; Take 1-2 tablets ( mg total) by mouth daily at bedtime    zolpidem (AMBIEN) 10 mg tablet; Take 1 tablet (10 mg total) by mouth daily at bedtime as needed for sleep    Primary hypertension  Reviewed blood pressure with patient.          Seasonal allergic rhinitis, unspecified trigger  Patient expressed rhinitis. Mentioned the use of Claritin previously with benefits. Dicussed adding it back and patient agreeable.            Depression Screening and Follow-up Plan: Patient was screened for depression during today's encounter. They screened negative with a PHQ-2 score of 0.      History of Present Illness     Patient presents today for routine checkup. Patient expressed concern for his insomnia. Expressed that he stays awake until roughly 2am and then takes his Ambien. Expressed that he only gets roughly 3 to 4 hours of sleep after taking the Ambien. Expressed that his insomnia is starting to effect him. Patient expressed that he has had a constant runny nose and cough. Denies trying OTC medications but had used Claritin in the past, which helped.         History obtained from : patient  Review of Systems   Constitutional:  Positive for fatigue. Negative for chills and fever.   HENT:  Positive for postnasal drip. Negative for congestion, ear pain, hearing loss, rhinorrhea, sinus pressure, sinus pain,  sore throat and trouble swallowing.    Eyes: Negative.  Negative for pain and visual disturbance.   Respiratory:  Positive for cough. Negative for chest tightness, shortness of breath and wheezing.    Cardiovascular: Negative.  Negative for chest pain, palpitations and leg swelling.   Gastrointestinal: Negative.  Negative for abdominal pain, blood in stool, constipation, diarrhea, nausea and vomiting.   Endocrine: Negative.  Negative for cold intolerance, heat intolerance, polydipsia, polyphagia and polyuria.   Genitourinary: Negative.  Negative for difficulty urinating, dysuria, flank pain and urgency.   Musculoskeletal: Negative.  Negative for arthralgias, back pain, gait problem and myalgias.   Skin: Negative.  Negative for rash.   Allergic/Immunologic: Negative.    Neurological:  Negative for dizziness, weakness, light-headedness and headaches.   Hematological: Negative.    Psychiatric/Behavioral:  Positive for sleep disturbance. Negative for behavioral problems and dysphoric mood. The patient is not nervous/anxious.      Current Outpatient Medications on File Prior to Visit   Medication Sig Dispense Refill    albuterol (Ventolin HFA) 90 mcg/act inhaler Inhale 2 puffs every 4 (four) hours as needed for wheezing or shortness of breath 18 g 2    ALPRAZolam (XANAX) 0.5 mg tablet Take 1 tablet by mouth twice daily as needed for anxiety 60 tablet 0    aspirin 81 MG tablet Take 81 mg by mouth daily      Azelastine HCl 137 MCG/SPRAY SOLN Use 1 spray(s) in each nostril twice daily 30 mL 5    ergocalciferol (VITAMIN D2) 50,000 units Take 1 capsule (50,000 Units total) by mouth once a week 4 capsule 3    fluticasone (FLONASE) 50 mcg/act nasal spray 1 spray into each nostril daily      fluticasone-salmeterol (AirDuo RespiClick 232/14) 232-14 mcg/act dry powder inhaler Inhale 1 puff 2 (two) times a day Rinse mouth after use. 1 each 3    furosemide (LASIX) 40 mg tablet Take 1 tablet (40 mg total) by mouth daily as needed  "(swelling) 90 tablet 1    losartan (COZAAR) 100 MG tablet Take 1 tablet (100 mg total) by mouth daily for 7 days 90 tablet 1    metoprolol succinate (TOPROL-XL) 25 mg 24 hr tablet Take 1 tablet by mouth once daily 100 tablet 1    pravastatin (PRAVACHOL) 40 mg tablet Take 1 tablet (40 mg total) by mouth daily for 7 days 90 tablet 1    [DISCONTINUED] zolpidem (AMBIEN) 10 mg tablet Take 1 tablet (10 mg total) by mouth daily at bedtime as needed for sleep 30 tablet 0     No current facility-administered medications on file prior to visit.      Social History     Tobacco Use    Smoking status: Former     Types: Cigarettes     Passive exposure: Never    Smokeless tobacco: Never   Vaping Use    Vaping status: Never Used   Substance and Sexual Activity    Alcohol use: Yes     Comment: SOCIAL    Drug use: No    Sexual activity: Not on file         Objective     /76   Pulse 94   Temp (!) 97.4 °F (36.3 °C)   Ht 5' 11\" (1.803 m)   Wt 124 kg (273 lb)   SpO2 94%   BMI 38.08 kg/m²     Physical Exam  Vitals and nursing note reviewed.   Constitutional:       General: He is not in acute distress.     Appearance: Normal appearance. He is well-developed.   HENT:      Head: Normocephalic and atraumatic.      Right Ear: Decreased hearing noted.      Left Ear: Decreased hearing noted.      Nose: Rhinorrhea present. Rhinorrhea is clear.      Right Turbinates: Swollen.      Left Turbinates: Swollen.   Eyes:      Conjunctiva/sclera: Conjunctivae normal.   Cardiovascular:      Rate and Rhythm: Normal rate and regular rhythm.      Heart sounds: No murmur heard.  Pulmonary:      Effort: Pulmonary effort is normal. No respiratory distress.      Breath sounds: Normal breath sounds.   Abdominal:      Palpations: Abdomen is soft.      Tenderness: There is no abdominal tenderness.   Musculoskeletal:         General: No swelling.      Cervical back: Neck supple.   Skin:     General: Skin is warm and dry.   Neurological:      General: No " focal deficit present.      Mental Status: He is alert and oriented to person, place, and time. Mental status is at baseline.   Psychiatric:         Mood and Affect: Mood normal.         Behavior: Behavior normal.         Thought Content: Thought content normal.         Judgment: Judgment normal.       Administrative Statements   I have spent a total time of 15 minutes in caring for this patient on the day of the visit/encounter including Diagnostic results, Risks and benefits of tx options, Instructions for management, Patient and family education, Importance of tx compliance, Documenting in the medical record, Reviewing / ordering tests, medicine, procedures  , and Obtaining or reviewing history  .

## 2024-11-06 DIAGNOSIS — I10 PRIMARY HYPERTENSION: ICD-10-CM

## 2024-11-06 RX ORDER — FUROSEMIDE 40 MG/1
40 TABLET ORAL DAILY PRN
Qty: 90 TABLET | Refills: 1 | Status: SHIPPED | OUTPATIENT
Start: 2024-11-06

## 2024-11-06 NOTE — TELEPHONE ENCOUNTER
Reason for call:   [x] Refill   [] Prior Auth  [x] Other: Patient requesting 90 day supply with a years worth of refills.    Office:   [x] [facility]   [] Specialty/Provider -     Medication: furosemide (LASIX) 40 mg tablet     Dose/Frequency: Take 1 tablet (40 mg total) by mouth daily as needed (swelling)     Quantity: 90 tablet     [facility] [id number] [phone]    Does the patient have enough for 3 days?   [] Yes   [x] No - Send as HP to POD

## 2024-11-11 ENCOUNTER — TELEPHONE (OUTPATIENT)
Age: 74
End: 2024-11-11

## 2024-11-11 ENCOUNTER — OFFICE VISIT (OUTPATIENT)
Dept: INTERNAL MEDICINE CLINIC | Facility: CLINIC | Age: 74
End: 2024-11-11
Payer: MEDICARE

## 2024-11-11 VITALS
OXYGEN SATURATION: 99 % | BODY MASS INDEX: 38.78 KG/M2 | WEIGHT: 277 LBS | HEIGHT: 71 IN | HEART RATE: 96 BPM | TEMPERATURE: 98.2 F | SYSTOLIC BLOOD PRESSURE: 122 MMHG | DIASTOLIC BLOOD PRESSURE: 68 MMHG

## 2024-11-11 DIAGNOSIS — G47.09 OTHER INSOMNIA: ICD-10-CM

## 2024-11-11 DIAGNOSIS — J01.10 ACUTE NON-RECURRENT FRONTAL SINUSITIS: Primary | ICD-10-CM

## 2024-11-11 LAB
SARS-COV-2 AG UPPER RESP QL IA: NEGATIVE
VALID CONTROL: NORMAL

## 2024-11-11 PROCEDURE — G2211 COMPLEX E/M VISIT ADD ON: HCPCS | Performed by: PHYSICIAN ASSISTANT

## 2024-11-11 PROCEDURE — 87811 SARS-COV-2 COVID19 W/OPTIC: CPT | Performed by: PHYSICIAN ASSISTANT

## 2024-11-11 PROCEDURE — 99213 OFFICE O/P EST LOW 20 MIN: CPT | Performed by: PHYSICIAN ASSISTANT

## 2024-11-11 RX ORDER — AZITHROMYCIN 250 MG/1
TABLET, FILM COATED ORAL
Qty: 6 TABLET | Refills: 0 | Status: SHIPPED | OUTPATIENT
Start: 2024-11-11 | End: 2024-11-16

## 2024-11-11 RX ORDER — FLUTICASONE PROPIONATE 50 MCG
1 SPRAY, SUSPENSION (ML) NASAL DAILY
Qty: 16 G | Refills: 1 | Status: SHIPPED | OUTPATIENT
Start: 2024-11-11

## 2024-11-11 NOTE — TELEPHONE ENCOUNTER
Pt c/o nasal congestion, cough and dull headache x 4 days. Pt states he is using the nasal spray prescribed but it is not helping.  Pt was requesting an antibiotic but an appointment was offered and accepted. Pt will see Eva today at 3:40 pm

## 2024-11-11 NOTE — PROGRESS NOTES
Ambulatory Visit  Name: Umang Morris      : 1950      MRN: [id number]  Encounter Provider: Eva Smart PA-C  Encounter Date: 2024   Encounter department: [facility]    Assessment & Plan  Acute non-recurrent frontal sinusitis  Patient reports to office today with complaints of sinus congestion and runny nose. Tried over the counter medications without relief. Negative covid test in office. Prescribe azithromycin and refilled Flonase.     Orders:    fluticasone (FLONASE) 50 mcg/act nasal spray; 1 spray into each nostril daily    POCT Rapid Covid Ag    azithromycin (Zithromax) 250 mg tablet; Take 2 tablets (500 mg total) by mouth daily for 1 day, THEN 1 tablet (250 mg total) daily for 4 days.    Other insomnia  Patient reported still not sleeping well. Instructed to take three trazodone for the week and message via [employer] by the end of the week to see if there was improvement.            Depression Screening and Follow-up Plan: Patient was screened for depression during today's encounter. They screened negative with a PHQ-2 score of 0.      History of Present Illness     Patient present to the office today with complaints of nasal congestion since Thursday. Reports that he has a sore throat with post nasal drip. Has tried over the counter medications and Astelin without relief. Patient mentioned he no longer has Flonase. Patient denies cough and chest pain.         History obtained from : patient  Review of Systems   Constitutional:  Negative for chills and fever.   HENT:  Positive for congestion, postnasal drip, sinus pain and sore throat. Negative for ear pain.    Eyes:  Negative for pain and visual disturbance.   Respiratory:  Negative for cough and shortness of breath.    Cardiovascular:  Negative for chest pain and palpitations.   Gastrointestinal:  Negative for abdominal pain and vomiting.   Genitourinary:  Negative for dysuria and hematuria.   Musculoskeletal:   Negative for arthralgias and back pain.   Skin:  Negative for color change and rash.   Neurological:  Negative for seizures and syncope.   All other systems reviewed and are negative.    Current Outpatient Medications on File Prior to Visit   Medication Sig Dispense Refill    albuterol (Ventolin HFA) 90 mcg/act inhaler Inhale 2 puffs every 4 (four) hours as needed for wheezing or shortness of breath 18 g 2    ALPRAZolam (XANAX) 0.5 mg tablet Take 1 tablet by mouth twice daily as needed for anxiety 60 tablet 0    aspirin 81 MG tablet Take 81 mg by mouth daily      Azelastine HCl 137 MCG/SPRAY SOLN Use 1 spray(s) in each nostril twice daily 30 mL 5    fluticasone-salmeterol (AirDuo RespiClick 232/14) 232-14 mcg/act dry powder inhaler Inhale 1 puff 2 (two) times a day Rinse mouth after use. 1 each 3    furosemide (LASIX) 40 mg tablet Take 1 tablet (40 mg total) by mouth daily as needed (swelling) 90 tablet 1    losartan (COZAAR) 100 MG tablet Take 1 tablet (100 mg total) by mouth daily for 7 days 90 tablet 1    metoprolol succinate (TOPROL-XL) 25 mg 24 hr tablet Take 1 tablet by mouth once daily 100 tablet 1    pravastatin (PRAVACHOL) 40 mg tablet Take 1 tablet (40 mg total) by mouth daily for 7 days 90 tablet 1    traZODone (DESYREL) 50 mg tablet Take 1-2 tablets ( mg total) by mouth daily at bedtime 60 tablet 0    zolpidem (AMBIEN) 10 mg tablet Take 1 tablet (10 mg total) by mouth daily at bedtime as needed for sleep 30 tablet 0    [DISCONTINUED] fluticasone (FLONASE) 50 mcg/act nasal spray 1 spray into each nostril daily      ergocalciferol (VITAMIN D2) 50,000 units Take 1 capsule (50,000 Units total) by mouth once a week (Patient not taking: Reported on 11/11/2024) 4 capsule 3     No current facility-administered medications on file prior to visit.      Social History     Tobacco Use    Smoking status: Former     Types: Cigarettes     Passive exposure: Never    Smokeless tobacco: Never   Vaping Use    Vaping  "status: Never Used   Substance and Sexual Activity    Alcohol use: Yes     Comment: SOCIAL    Drug use: No    Sexual activity: Not on file         Objective     /68   Pulse 96   Temp 98.2 °F (36.8 °C) (Tympanic)   Ht 5' 11\" (1.803 m)   Wt 126 kg (277 lb)   SpO2 99%   BMI 38.63 kg/m²     Physical Exam  Vitals and nursing note reviewed.   Constitutional:       General: He is not in acute distress.     Appearance: He is well-developed.   HENT:      Head: Normocephalic and atraumatic.      Right Ear: Tympanic membrane is bulging.      Left Ear: Tympanic membrane is bulging.      Nose: Mucosal edema, congestion and rhinorrhea present.      Right Sinus: Frontal sinus tenderness present.      Left Sinus: Frontal sinus tenderness present.   Eyes:      Conjunctiva/sclera: Conjunctivae normal.   Cardiovascular:      Rate and Rhythm: Normal rate and regular rhythm.      Heart sounds: No murmur heard.  Pulmonary:      Effort: Pulmonary effort is normal. No respiratory distress.      Breath sounds: Normal breath sounds.   Abdominal:      Palpations: Abdomen is soft.   Musculoskeletal:      Cervical back: Neck supple.   Skin:     General: Skin is warm and dry.   Neurological:      Mental Status: He is alert.   Psychiatric:         Mood and Affect: Mood normal.         Behavior: Behavior normal.       Administrative Statements   I have spent a total time of 15 minutes in caring for this patient on the day of the visit/encounter including Risks and benefits of tx options, Instructions for management, Patient and family education, Importance of tx compliance, Risk factor reductions, Impressions, Counseling / Coordination of care, Documenting in the medical record, Reviewing / ordering tests, medicine, procedures  , and Obtaining or reviewing history  .  "

## 2024-11-11 NOTE — ASSESSMENT & PLAN NOTE
Patient reported still not sleeping well. Instructed to take three trazodone for the week and message via [employer] by the end of the week to see if there was improvement.

## 2024-11-11 NOTE — ASSESSMENT & PLAN NOTE
Patient reports to office today with complaints of sinus congestion and runny nose. Tried over the counter medications without relief. Negative covid test in office. Prescribe azithromycin and refilled Flonase.     Orders:    fluticasone (FLONASE) 50 mcg/act nasal spray; 1 spray into each nostril daily    POCT Rapid Covid Ag    azithromycin (Zithromax) 250 mg tablet; Take 2 tablets (500 mg total) by mouth daily for 1 day, THEN 1 tablet (250 mg total) daily for 4 days.

## 2024-11-19 ENCOUNTER — TELEPHONE (OUTPATIENT)
Age: 74
End: 2024-11-19

## 2024-11-19 NOTE — TELEPHONE ENCOUNTER
Pt called in to let PCP know that the combination of the Trazadone and the Ambien is helping him sleep. He is taking 2 pills of Trazadone with one Ambien. He also would like Eva ASTORGA to know that he is still having nasal congestion with drainage. He is looking for any recommendations she has for this or if he needs a extension on the antibiotic that he is on. If medication is needed patient would like to use the [facility] pharmacy.

## 2024-11-19 NOTE — TELEPHONE ENCOUNTER
Wonderful news about his sleep. Recommend adding some mucinex. May also use Afrin which will help him breathe immediately, but can only use for a few days.

## 2024-11-26 DIAGNOSIS — G47.09 OTHER INSOMNIA: ICD-10-CM

## 2024-11-26 RX ORDER — ZOLPIDEM TARTRATE 10 MG/1
10 TABLET ORAL
Qty: 30 TABLET | Refills: 0 | Status: SHIPPED | OUTPATIENT
Start: 2024-11-26

## 2024-11-26 RX ORDER — TRAZODONE HYDROCHLORIDE 50 MG/1
50-100 TABLET, FILM COATED ORAL
Qty: 60 TABLET | Refills: 0 | Status: SHIPPED | OUTPATIENT
Start: 2024-11-26

## 2024-11-26 NOTE — TELEPHONE ENCOUNTER
Patient advised is taking 3 tablets at bedtime of the Trazodone 50 mg. Patient also asking if refills could be placed on the prescriptions.      Reason for call:   [x] Refill   [] Prior Auth  [] Other:     Office:   [x] PCP/Provider - Eva Smart PA-C   [] Specialty/Provider -     Medication:   traZODone (DESYREL) 50 mg tablet   zolpidem (AMBIEN) 10 mg tablet     Dose/Frequency:      mg, Oral, Daily at bedtime   10 mg, Oral, Daily at bedtime PRN   Quantity:   60  30  Pharmacy: [facility] [address] [place] [address] [id number]     Does the patient have enough for 3 days?   [] Yes   [x] No - Send as HP to POD

## 2024-12-03 ENCOUNTER — OFFICE VISIT (OUTPATIENT)
Dept: PODIATRY | Facility: CLINIC | Age: 74
End: 2024-12-03
Payer: MEDICARE

## 2024-12-03 VITALS
BODY MASS INDEX: 38.78 KG/M2 | SYSTOLIC BLOOD PRESSURE: 135 MMHG | WEIGHT: 277 LBS | HEART RATE: 121 BPM | DIASTOLIC BLOOD PRESSURE: 88 MMHG | HEIGHT: 71 IN

## 2024-12-03 DIAGNOSIS — L85.3 XEROSIS OF SKIN: Primary | ICD-10-CM

## 2024-12-03 DIAGNOSIS — M79.675 PAIN IN TOES OF BOTH FEET: ICD-10-CM

## 2024-12-03 DIAGNOSIS — I73.9 PERIPHERAL ARTERIAL DISEASE (HCC): ICD-10-CM

## 2024-12-03 DIAGNOSIS — M79.674 PAIN IN TOES OF BOTH FEET: ICD-10-CM

## 2024-12-03 DIAGNOSIS — R73.03 PRE-DIABETES: ICD-10-CM

## 2024-12-03 DIAGNOSIS — B35.1 ONYCHOMYCOSIS: ICD-10-CM

## 2024-12-03 PROCEDURE — 11721 DEBRIDE NAIL 6 OR MORE: CPT | Performed by: PODIATRIST

## 2024-12-03 PROCEDURE — RECHECK: Performed by: PODIATRIST

## 2024-12-03 NOTE — PROGRESS NOTES
Name: Umang Morris      : 1950      MRN: [id number]  Encounter Provider: Garry Terrell DPM  Encounter Date: 12/3/2024   Encounter department: [facility] [place]  :  Assessment & Plan  Xerosis of skin       Pt was instructed to use lotion once a day on both feet such as cerave, Cetaphil or similar thick style of lotion.   Onychomycosis       Debride mycotic nails and thin the nail plates x 10 with the use of a nail nipper manually and an electric Dremel bur was used to reduce the thickness of the nail beds and smoothed the distal aspect of the nails.   Peripheral arterial disease (HCC)         Pain in toes of both feet         Pre-diabetes         Discussed proper shoe gear, daily inspections of feet, and general foot health with patient. Patient has Q8  findings and is recommended for at risk foot care every 9-10 weeks.    Patients most recent complete clinical foot exam was on: 10/24/2024      Return in about 10 weeks (around 2025).     History of Present Illness     HPI  Umang Morris is a 74 y.o. male who presents There is loss of turgor present bilaterally.Chief complaint of painful thick nails on both feet.  Dry skin is buildup on the inside part of his left heel.Patient presents for at-risk foot care.  Patient has no acute concerns today.  Patient has significant lower extremity risk due to neuropathy, parasthesia, edema, and trophic skin changes to the lower extremity.   History obtained from: patient    Review of Systems  Medical History Reviewed by provider this encounter:     .  Current Outpatient Medications on File Prior to Visit   Medication Sig Dispense Refill    albuterol (Ventolin HFA) 90 mcg/act inhaler Inhale 2 puffs every 4 (four) hours as needed for wheezing or shortness of breath 18 g 2    ALPRAZolam (XANAX) 0.5 mg tablet Take 1 tablet by mouth twice daily as needed for anxiety 60 tablet 0    aspirin 81 MG tablet Take 81 mg by mouth daily       "Azelastine HCl 137 MCG/SPRAY SOLN Use 1 spray(s) in each nostril twice daily 30 mL 5    fluticasone (FLONASE) 50 mcg/act nasal spray 1 spray into each nostril daily 16 g 1    fluticasone-salmeterol (AirDuo RespiClick 232/14) 232-14 mcg/act dry powder inhaler Inhale 1 puff 2 (two) times a day Rinse mouth after use. 1 each 3    furosemide (LASIX) 40 mg tablet Take 1 tablet (40 mg total) by mouth daily as needed (swelling) 90 tablet 1    metoprolol succinate (TOPROL-XL) 25 mg 24 hr tablet Take 1 tablet by mouth once daily 100 tablet 1    traZODone (DESYREL) 50 mg tablet Take 1-2 tablets ( mg total) by mouth daily at bedtime 60 tablet 0    zolpidem (AMBIEN) 10 mg tablet Take 1 tablet (10 mg total) by mouth daily at bedtime as needed for sleep 30 tablet 0    ergocalciferol (VITAMIN D2) 50,000 units Take 1 capsule (50,000 Units total) by mouth once a week (Patient not taking: Reported on 12/3/2024) 4 capsule 3    losartan (COZAAR) 100 MG tablet Take 1 tablet (100 mg total) by mouth daily for 7 days 90 tablet 1    pravastatin (PRAVACHOL) 40 mg tablet Take 1 tablet (40 mg total) by mouth daily for 7 days 90 tablet 1     No current facility-administered medications on file prior to visit.      Social History     Tobacco Use    Smoking status: Former     Types: Cigarettes     Passive exposure: Never    Smokeless tobacco: Never   Vaping Use    Vaping status: Never Used   Substance and Sexual Activity    Alcohol use: Yes     Comment: SOCIAL    Drug use: No    Sexual activity: Not on file        Objective   /88 (BP Location: Left arm, Patient Position: Sitting, Cuff Size: Large)   Pulse (!) 121   Ht 5' 11\" (1.803 m)   Wt 126 kg (277 lb)   BMI 38.63 kg/m²      Physical Exam  Vascular status is a faint 1/4 DP PT negative digital hair delayed capillary refill bilaterally.  There is slight edema present bilaterally and there is capillary refill of approximately 3 seconds.    Derm nails are brittle elongated " hypertrophic white-yellow discoloration with subungual debris x 10.  There is an increased thickness and the nails are approximately 2 to 3 mm.  There is white-yellow flaky tissue present on the plantar and dorsal aspect of both feet. There is a buildup of hypertrophic tissue on the plantar medial aspect of the first IPJ on the right foot and there is also buildup on the plantar medial aspect of the left heel.   There are fissures present within the hypertrophic tissue.  No dried blood is present.    Ortho mild exostosis is present on the plantar medial aspect of the first IPJ on the right foot.    Neuro is unchanged from his previous visit on 9/3/2024.    Administrative Statements   I have spent a total time of 15 minutes in caring for this patient on the day of the visit/encounter including Risks and benefits of tx options, Instructions for management, Patient and family education, Importance of tx compliance, Risk factor reductions, Counseling / Coordination of care, Documenting in the medical record, and Reviewing / ordering tests, medicine, procedures  .

## 2024-12-11 PROBLEM — J01.10 ACUTE NON-RECURRENT FRONTAL SINUSITIS: Status: RESOLVED | Noted: 2024-11-11 | Resolved: 2024-12-11

## 2024-12-28 ENCOUNTER — NURSE TRIAGE (OUTPATIENT)
Dept: OTHER | Facility: OTHER | Age: 74
End: 2024-12-28

## 2024-12-28 DIAGNOSIS — G47.09 OTHER INSOMNIA: ICD-10-CM

## 2024-12-28 RX ORDER — ZOLPIDEM TARTRATE 10 MG/1
10 TABLET ORAL
Qty: 30 TABLET | Refills: 0 | Status: SHIPPED | OUTPATIENT
Start: 2024-12-28

## 2024-12-28 NOTE — TELEPHONE ENCOUNTER
"Answer Assessment - Initial Assessment Questions  1. DRUG NAME: \"What medicine do you need to have refilled?\"        Ambien     2. REFILLS REMAINING: \"How many refills are remaining?\" (Note: The label on the medicine or pill bottle will show how many refills are remaining. If there are no refills remaining, then a renewal may be needed.)        0    3. EXPIRATION DATE: \"What is the expiration date?\" (Note: The label states when the prescription will , and thus can no longer be refilled.)        N/a    4. PRESCRIBING HCP: \"Who prescribed it?\" Reason: If prescribed by specialist, call should be referred to that group.        PCP    Protocols used: Medication Refill and Renewal Call-Adult-    "

## 2024-12-28 NOTE — TELEPHONE ENCOUNTER
"Regarding: medication refill  ----- Message from Tk CHASE sent at 12/28/2024 12:13 PM EST -----  \" I really rely on the ambien that was not called in, can jin please call it in?\"    "

## 2024-12-28 NOTE — TELEPHONE ENCOUNTER
On call provider contacted, stated a new prescription has been sent to the patient's pharmacy. Patient made aware and verbalized understanding. No further assistance needed at this time.      Reason for Disposition   Caller requesting a CONTROLLED substance prescription refill (e.g., narcotics, ADHD medicines)    Protocols used: Medication Refill and Renewal Call-Adult-

## 2024-12-31 NOTE — TELEPHONE ENCOUNTER
Syliva Endo is the most potent, stick with the plain, not the allegra D as that can cause his BP to rise 97.3

## 2025-01-06 DIAGNOSIS — E78.5 HYPERLIPIDEMIA, UNSPECIFIED HYPERLIPIDEMIA TYPE: ICD-10-CM

## 2025-01-06 DIAGNOSIS — I10 PRIMARY HYPERTENSION: ICD-10-CM

## 2025-01-06 RX ORDER — PRAVASTATIN SODIUM 40 MG
40 TABLET ORAL DAILY
Qty: 90 TABLET | Refills: 1 | Status: SHIPPED | OUTPATIENT
Start: 2025-01-06 | End: 2025-04-06

## 2025-01-06 RX ORDER — LOSARTAN POTASSIUM 100 MG/1
100 TABLET ORAL DAILY
Qty: 90 TABLET | Refills: 1 | Status: SHIPPED | OUTPATIENT
Start: 2025-01-06 | End: 2025-04-06

## 2025-01-06 NOTE — TELEPHONE ENCOUNTER
Reason for call:   [x] Refill   [] Prior Auth  [] Other:     Office:   [x] PCP/Provider - Eva Smart PA-C   [] Specialty/Provider -     Medication: losartan (COZAAR) 100 MG tablet / Take 1 tablet (100 mg total) by mouth daily for 7 days     pravastatin (PRAVACHOL) 40 mg tablet / Take 1 tablet (40 mg total) by mouth daily for 7 days     Pharmacy: [address]     Does the patient have enough for 3 days?   [] Yes   [x] No - Send as HP to POD

## 2025-01-20 DIAGNOSIS — I10 PRIMARY HYPERTENSION: ICD-10-CM

## 2025-01-20 RX ORDER — METOPROLOL SUCCINATE 25 MG/1
25 TABLET, EXTENDED RELEASE ORAL DAILY
Qty: 90 TABLET | Refills: 1 | Status: SHIPPED | OUTPATIENT
Start: 2025-01-20

## 2025-01-20 NOTE — TELEPHONE ENCOUNTER
Reason for call:   [x] Refill   [] Prior Auth  [] Other:     Office:   [x] PCP/Provider -   [] Specialty/Provider -     Medication: metoprolol succinate (TOPROL-XL) 25 mg 24 hr tablet     Dose/Frequency:  Take 1 tablet by mouth once daily     Quantity: 90    Pharmacy: [facility] [address] [place] [address] - [facility] [phone]    Does the patient have enough for 3 days?   [] Yes   [x] No - Send as HP to POD

## 2025-01-28 DIAGNOSIS — G47.09 OTHER INSOMNIA: ICD-10-CM

## 2025-01-28 RX ORDER — ZOLPIDEM TARTRATE 10 MG/1
10 TABLET ORAL
Qty: 30 TABLET | Refills: 0 | Status: SHIPPED | OUTPATIENT
Start: 2025-01-28

## 2025-01-28 NOTE — TELEPHONE ENCOUNTER
Reason for call:   [x] Refill   [] Prior Auth  [] Other:     Office:   [x] PCP/Provider -   [] Specialty/Provider -     Medication: zolpidem (AMBIEN) 10 mg tablet     Dose/Frequency: Take 1 tablet (10 mg total) by mouth daily at bedtime as needed     Quantity: 30 tablet    Pharmacy: [facility] [address] [place] [address] [place]     Does the patient have enough for 3 days?   [] Yes   [x] No - Send as HP to POD

## 2025-02-03 ENCOUNTER — TELEPHONE (OUTPATIENT)
Age: 75
End: 2025-02-03

## 2025-02-03 DIAGNOSIS — R23.8 REDNESS AND SWELLING OF LOWER LEG: ICD-10-CM

## 2025-02-03 DIAGNOSIS — M79.89 RIGHT LEG SWELLING: Primary | ICD-10-CM

## 2025-02-03 DIAGNOSIS — M79.89 REDNESS AND SWELLING OF LOWER LEG: ICD-10-CM

## 2025-02-03 DIAGNOSIS — M79.89 LEG SWELLING: Primary | ICD-10-CM

## 2025-02-03 DIAGNOSIS — R60.0 LOCALIZED EDEMA: ICD-10-CM

## 2025-02-03 NOTE — TELEPHONE ENCOUNTER
Patient says that he his right leg swells up and he can't get it down. He often doubles up on his water pill but it doesn't really help.  He is requesting that Eva put an order in for the Vascular US of the leg to make sure nothing else is going on.  Please contact patient and advise.  Thank you.

## 2025-02-05 ENCOUNTER — HOSPITAL ENCOUNTER (OUTPATIENT)
Dept: NON INVASIVE DIAGNOSTICS | Facility: HOSPITAL | Age: 75
Discharge: HOME | End: 2025-02-05
Payer: MEDICARE

## 2025-02-05 ENCOUNTER — TELEPHONE (OUTPATIENT)
Dept: INTERNAL MEDICINE CLINIC | Facility: CLINIC | Age: 75
End: 2025-02-05

## 2025-02-05 ENCOUNTER — RESULTS FOLLOW-UP (OUTPATIENT)
Dept: INTERNAL MEDICINE CLINIC | Facility: CLINIC | Age: 75
End: 2025-02-05

## 2025-02-05 DIAGNOSIS — R45.89 ANXIETY ABOUT HEALTH: ICD-10-CM

## 2025-02-05 DIAGNOSIS — R23.8 REDNESS AND SWELLING OF LOWER LEG: ICD-10-CM

## 2025-02-05 DIAGNOSIS — G47.09 OTHER INSOMNIA: ICD-10-CM

## 2025-02-05 DIAGNOSIS — R60.0 LOCALIZED EDEMA: ICD-10-CM

## 2025-02-05 DIAGNOSIS — M79.89 REDNESS AND SWELLING OF LOWER LEG: ICD-10-CM

## 2025-02-05 PROCEDURE — 93971 EXTREMITY STUDY: CPT

## 2025-02-05 PROCEDURE — 93971 EXTREMITY STUDY: CPT | Performed by: SURGERY

## 2025-02-05 RX ORDER — ALPRAZOLAM 0.5 MG
0.5 TABLET ORAL 3 TIMES DAILY PRN
Qty: 90 TABLET | Refills: 0 | Status: SHIPPED | OUTPATIENT
Start: 2025-02-05

## 2025-02-05 NOTE — TELEPHONE ENCOUNTER
Patient called the RX Refill Line. Message is being forwarded to the office.     Patient called and wanted to see if he can get an increase on the Xanax. Currently on 0.25 mg. If not then needs a refill on the current dose and would like that sent to [facility] [address]    Please contact patient at [phone] if any issues.

## 2025-02-07 ENCOUNTER — TELEPHONE (OUTPATIENT)
Age: 75
End: 2025-02-07

## 2025-02-07 NOTE — TELEPHONE ENCOUNTER
Shakeel from [facility] calling to confirm that patient is taking both alprazalam 0.5 mg and zolpidem 10 mg.  Please confirm.  Thank you.

## 2025-02-24 ENCOUNTER — OFFICE VISIT (OUTPATIENT)
Dept: INTERNAL MEDICINE CLINIC | Facility: CLINIC | Age: 75
End: 2025-02-24
Payer: MEDICARE

## 2025-02-24 VITALS
WEIGHT: 273 LBS | HEART RATE: 100 BPM | DIASTOLIC BLOOD PRESSURE: 70 MMHG | OXYGEN SATURATION: 97 % | BODY MASS INDEX: 38.22 KG/M2 | SYSTOLIC BLOOD PRESSURE: 122 MMHG | TEMPERATURE: 95.5 F | HEIGHT: 71 IN

## 2025-02-24 DIAGNOSIS — N18.30 STAGE 3 CHRONIC KIDNEY DISEASE, UNSPECIFIED WHETHER STAGE 3A OR 3B CKD (HCC): ICD-10-CM

## 2025-02-24 DIAGNOSIS — Z12.5 SCREENING PSA (PROSTATE SPECIFIC ANTIGEN): ICD-10-CM

## 2025-02-24 DIAGNOSIS — E78.2 MIXED HYPERLIPIDEMIA: ICD-10-CM

## 2025-02-24 DIAGNOSIS — L97.812 VENOUS STASIS ULCER OF OTHER PART OF RIGHT LOWER LEG WITH FAT LAYER EXPOSED WITHOUT VARICOSE VEINS (HCC): ICD-10-CM

## 2025-02-24 DIAGNOSIS — E55.9 VITAMIN D DEFICIENCY: ICD-10-CM

## 2025-02-24 DIAGNOSIS — R73.01 ELEVATED FASTING GLUCOSE: ICD-10-CM

## 2025-02-24 DIAGNOSIS — I10 PRIMARY HYPERTENSION: ICD-10-CM

## 2025-02-24 DIAGNOSIS — Z13.0 SCREENING FOR DEFICIENCY ANEMIA: ICD-10-CM

## 2025-02-24 DIAGNOSIS — J01.10 ACUTE NON-RECURRENT FRONTAL SINUSITIS: ICD-10-CM

## 2025-02-24 DIAGNOSIS — I87.2 VENOUS STASIS ULCER OF OTHER PART OF RIGHT LOWER LEG WITH FAT LAYER EXPOSED WITHOUT VARICOSE VEINS (HCC): ICD-10-CM

## 2025-02-24 DIAGNOSIS — Z13.29 SCREENING FOR THYROID DISORDER: ICD-10-CM

## 2025-02-24 DIAGNOSIS — J30.1 NON-SEASONAL ALLERGIC RHINITIS DUE TO POLLEN: Primary | ICD-10-CM

## 2025-02-24 DIAGNOSIS — E66.01 MORBID (SEVERE) OBESITY DUE TO EXCESS CALORIES (HCC): ICD-10-CM

## 2025-02-24 PROCEDURE — 99213 OFFICE O/P EST LOW 20 MIN: CPT | Performed by: PHYSICIAN ASSISTANT

## 2025-02-24 PROCEDURE — G2211 COMPLEX E/M VISIT ADD ON: HCPCS | Performed by: PHYSICIAN ASSISTANT

## 2025-02-24 RX ORDER — LEVOCETIRIZINE DIHYDROCHLORIDE 5 MG/1
5 TABLET, FILM COATED ORAL EVERY EVENING
Qty: 30 TABLET | Refills: 2 | Status: SHIPPED | OUTPATIENT
Start: 2025-02-24

## 2025-02-24 RX ORDER — FLUTICASONE PROPIONATE 50 MCG
1 SPRAY, SUSPENSION (ML) NASAL DAILY
Qty: 16 G | Refills: 1 | Status: SHIPPED | OUTPATIENT
Start: 2025-02-24

## 2025-02-24 NOTE — ASSESSMENT & PLAN NOTE
Lab Results   Component Value Date    EGFR 82 01/17/2024    EGFR 59 06/25/2023    EGFR 79 01/30/2023    CREATININE 0.91 01/17/2024    CREATININE 1.20 06/25/2023    CREATININE 0.94 01/30/2023

## 2025-02-24 NOTE — ASSESSMENT & PLAN NOTE
Start xyzal. Directions for use and possible side effects discussed and patient verbalized understanding of these.    Orders:  •  levocetirizine (XYZAL) 5 MG tablet; Take 1 tablet (5 mg total) by mouth every evening

## 2025-02-24 NOTE — PROGRESS NOTES
Name: Umang Morris      : 1950      MRN: [id number]  Encounter Provider: Eva Smart PA-C  Encounter Date: 2025   Encounter department: [facility]  :  Assessment & Plan  Primary hypertension         Mixed hyperlipidemia    Orders:  •  Lipid panel; Future    Vitamin D deficiency    Orders:  •  Vitamin D 25 hydroxy; Future    Elevated fasting glucose    Orders:  •  Comprehensive metabolic panel; Future  •  Hemoglobin A1C; Future    Screening for thyroid disorder    Orders:  •  TSH, 3rd generation; Future    Screening PSA (prostate specific antigen)    Orders:  •  PSA, Total Screen; Future    Screening for deficiency anemia    Orders:  •  CBC and differential; Future    Venous stasis ulcer of other part of right lower leg with fat layer exposed without varicose veins (HCC)         Morbid (severe) obesity due to excess calories (HCC)           Stage 3 chronic kidney disease, unspecified whether stage 3a or 3b CKD (HCC)  Lab Results   Component Value Date    EGFR 82 2024    EGFR 59 2023    EGFR 79 2023    CREATININE 0.91 2024    CREATININE 1.20 2023    CREATININE 0.94 2023          Acute non-recurrent frontal sinusitis    Orders:  •  fluticasone (FLONASE) 50 mcg/act nasal spray; 1 spray into each nostril daily    Non-seasonal allergic rhinitis due to pollen  Start xyzal. Directions for use and possible side effects discussed and patient verbalized understanding of these.    Orders:  •  levocetirizine (XYZAL) 5 MG tablet; Take 1 tablet (5 mg total) by mouth every evening           History of Present Illness   Pt presents for routine visit. He is doing well overall but dealing with worsening allergy symptoms. He is due for annual labs. BP is controlled. Swelling in his legs has improved nicely.       Review of Systems   Constitutional:  Negative for chills and fever.   HENT:  Negative for congestion, ear pain, hearing loss, postnasal drip,  "rhinorrhea, sinus pressure, sinus pain, sore throat and trouble swallowing.    Eyes:  Negative for pain and visual disturbance.   Respiratory:  Negative for cough, chest tightness, shortness of breath and wheezing.    Cardiovascular: Negative.  Negative for chest pain, palpitations and leg swelling.   Gastrointestinal:  Negative for abdominal pain, blood in stool, constipation, diarrhea, nausea and vomiting.   Endocrine: Negative for cold intolerance, heat intolerance, polydipsia, polyphagia and polyuria.   Genitourinary:  Negative for difficulty urinating, dysuria, flank pain and urgency.   Musculoskeletal:  Negative for arthralgias, back pain, gait problem and myalgias.   Skin:  Negative for rash.   Allergic/Immunologic: Negative.    Neurological:  Negative for dizziness, weakness, light-headedness and headaches.   Hematological: Negative.    Psychiatric/Behavioral:  Negative for behavioral problems, dysphoric mood and sleep disturbance. The patient is not nervous/anxious.        Objective   /70   Pulse 100   Temp (!) 95.5 °F (35.3 °C) (Tympanic)   Ht 5' 11\" (1.803 m)   Wt 124 kg (273 lb)   SpO2 97%   BMI 38.08 kg/m²      Physical Exam  Vitals and nursing note reviewed.   Constitutional:       General: He is not in acute distress.     Appearance: Normal appearance. He is well-developed. He is not diaphoretic.   HENT:      Head: Normocephalic and atraumatic.      Right Ear: External ear normal.      Left Ear: External ear normal.      Nose: Nose normal.      Mouth/Throat:      Pharynx: No oropharyngeal exudate.   Eyes:      General: No scleral icterus.        Right eye: No discharge.         Left eye: No discharge.      Conjunctiva/sclera: Conjunctivae normal.      Pupils: Pupils are equal, round, and reactive to light.   Neck:      Thyroid: No thyromegaly.   Cardiovascular:      Rate and Rhythm: Normal rate and regular rhythm.      Heart sounds: Normal heart sounds. No murmur heard.     No friction " rub. No gallop.   Pulmonary:      Effort: Pulmonary effort is normal. No respiratory distress.      Breath sounds: Normal breath sounds. No wheezing or rales.   Abdominal:      General: Bowel sounds are normal. There is no distension.      Palpations: Abdomen is soft.      Tenderness: There is no abdominal tenderness.   Musculoskeletal:         General: No tenderness or deformity. Normal range of motion.      Cervical back: Normal range of motion and neck supple.   Skin:     General: Skin is warm and dry.   Neurological:      Mental Status: He is alert and oriented to person, place, and time.      Cranial Nerves: No cranial nerve deficit.   Psychiatric:         Behavior: Behavior normal.         Thought Content: Thought content normal.         Judgment: Judgment normal.

## 2025-02-28 DIAGNOSIS — G47.09 OTHER INSOMNIA: ICD-10-CM

## 2025-02-28 RX ORDER — ZOLPIDEM TARTRATE 10 MG/1
10 TABLET ORAL
Qty: 30 TABLET | Refills: 0 | Status: SHIPPED | OUTPATIENT
Start: 2025-02-28

## 2025-02-28 NOTE — TELEPHONE ENCOUNTER
Reason for call:   [x] Refill   [] Prior Auth  [] Other:     Office: [facility]  [x] PCP/Provider - Eva Smart   [] Specialty/Provider -     Medication: zolpidem     Dose/Frequency: 10 mg/ daily PRN     Quantity: 30 day supply     Pharmacy: [facility] in [place]     Does the patient have enough for 3 days?   [] Yes   [x] No - Send as HP to POD

## 2025-03-04 ENCOUNTER — OFFICE VISIT (OUTPATIENT)
Dept: PODIATRY | Facility: CLINIC | Age: 75
End: 2025-03-04
Payer: MEDICARE

## 2025-03-04 VITALS — WEIGHT: 273 LBS | HEIGHT: 71 IN | BODY MASS INDEX: 38.22 KG/M2

## 2025-03-04 DIAGNOSIS — M79.675 PAIN IN TOES OF BOTH FEET: ICD-10-CM

## 2025-03-04 DIAGNOSIS — I73.9 PERIPHERAL ARTERIAL DISEASE (HCC): ICD-10-CM

## 2025-03-04 DIAGNOSIS — M79.674 PAIN IN TOES OF BOTH FEET: ICD-10-CM

## 2025-03-04 DIAGNOSIS — B35.1 ONYCHOMYCOSIS: Primary | ICD-10-CM

## 2025-03-04 DIAGNOSIS — Z79.01 LONG TERM CURRENT USE OF ANTICOAGULANT: ICD-10-CM

## 2025-03-04 PROCEDURE — 11721 DEBRIDE NAIL 6 OR MORE: CPT | Performed by: PODIATRIST

## 2025-03-04 PROCEDURE — RECHECK: Performed by: PODIATRIST

## 2025-03-04 NOTE — PROGRESS NOTES
Name: Umang Morris      : 1950      MRN: [id number]  Encounter Provider: Garry Terrell DPM  Encounter Date: 3/4/2025   Encounter department: [facility] PODIATRY [place]  :  Assessment & Plan  Onychomycosis       Debride mycotic nails and thin the nail plates x 6 with the use of a nail nipper manually and an electric Dremel bur was used to reduce the thickness of the nail beds and smoothed the distal aspect of the nails.   Peripheral arterial disease (HCC)         Pain in toes of both feet         Long term current use of anticoagulant         Pt was instructed to use lotion once a day on both feet such as cerave, Cetaphil or similar thick style of lotion.   Recommend that the patient use either a manual pumice stone or purchase an battery-operated style pumice stone to keep the calluses on the smoother side between visits.  Discouraged the patient from using a Dremel bur to smooth it down    Return in about 10 weeks (around 2025).     History of Present Illness   HPI  Umang Morris is a 74 y.o. male who presents with chief complaint of painful thick nails on both feet and painful sharp dry skin present on both feet.  He has a history of peripheral arterial disease and is currently on baby aspirin as his blood thinner.Patient presents for at-risk foot care.  Patient has no acute concerns today.  Patient has significant lower extremity risk due to neuropathy, parasthesia, edema, and trophic skin changes to the lower extremity.   History obtained from: patient    Review of Systems  Medical History Reviewed by provider this encounter:     .  Current Outpatient Medications on File Prior to Visit   Medication Sig Dispense Refill    albuterol (Ventolin HFA) 90 mcg/act inhaler Inhale 2 puffs every 4 (four) hours as needed for wheezing or shortness of breath 18 g 2    ALPRAZolam (XANAX) 0.5 mg tablet Take 1 tablet (0.5 mg total) by mouth 3 (three) times a day as needed for anxiety 90 tablet  "0    aspirin 81 MG tablet Take 81 mg by mouth daily      fluticasone (FLONASE) 50 mcg/act nasal spray 1 spray into each nostril daily 16 g 1    fluticasone-salmeterol (AirDuo RespiClick 232/14) 232-14 mcg/act dry powder inhaler Inhale 1 puff 2 (two) times a day Rinse mouth after use. 1 each 3    furosemide (LASIX) 40 mg tablet Take 1 tablet (40 mg total) by mouth daily as needed (swelling) 90 tablet 1    levocetirizine (XYZAL) 5 MG tablet Take 1 tablet (5 mg total) by mouth every evening 30 tablet 2    losartan (COZAAR) 100 MG tablet Take 1 tablet (100 mg total) by mouth daily 90 tablet 1    metoprolol succinate (TOPROL-XL) 25 mg 24 hr tablet Take 1 tablet (25 mg total) by mouth daily 90 tablet 1    pravastatin (PRAVACHOL) 40 mg tablet Take 1 tablet (40 mg total) by mouth daily 90 tablet 1    traZODone (DESYREL) 50 mg tablet Take 1-2 tablets ( mg total) by mouth daily at bedtime 60 tablet 5    zolpidem (AMBIEN) 10 mg tablet Take 1 tablet (10 mg total) by mouth daily at bedtime as needed for sleep 30 tablet 0    ergocalciferol (VITAMIN D2) 50,000 units Take 1 capsule (50,000 Units total) by mouth once a week (Patient not taking: Reported on 11/11/2024) 4 capsule 3     No current facility-administered medications on file prior to visit.      Social History     Tobacco Use    Smoking status: Former     Types: Cigarettes     Passive exposure: Never    Smokeless tobacco: Never   Vaping Use    Vaping status: Never Used   Substance and Sexual Activity    Alcohol use: Yes     Comment: SOCIAL    Drug use: No    Sexual activity: Not on file        Objective   Ht 5' 11\" (1.803 m)   Wt 124 kg (273 lb)   BMI 38.08 kg/m²      Physical Exam  Vascular status is 0/4 DP PT negative digital hair, delayed capillary refill, normal distal cooling bilaterally.  There is edema present bilaterally also.  Capillary refill is approximately 3 to 4 seconds and edema is +1 to +2 pitting.    Derm nails are brittle elongated hypertrophic " yellow-white discoloration with subungual debris x 6.  There is no increased thickness and the nails are approximately 2 mm.  There is white flaky tissue present on the dorsal and plantar aspects of both feet.  There is no signs of any fissures within this tissue or dried blood.  There is a buildup of hypertrophic tissue present on the plantar medial aspect of the first MPJ on the right foot and on the plantar medial aspect of the heel on the left foot.  There are numerous small fissures present within this hypertrophic lesion but no signs of any dried blood.  The fissures and leaking into the hypertrophic tissue.  There is loss of turgor noted bilaterally and dependent rubor is also present bilaterally.    Administrative Statements   I have spent a total time of 15 minutes in caring for this patient on the day of the visit/encounter including Risks and benefits of tx options, Instructions for management, Patient and family education, Importance of tx compliance, Risk factor reductions, Counseling / Coordination of care, Documenting in the medical record, and Obtaining or reviewing history  .

## 2025-04-01 DIAGNOSIS — G47.09 OTHER INSOMNIA: ICD-10-CM

## 2025-04-01 RX ORDER — ZOLPIDEM TARTRATE 10 MG/1
10 TABLET ORAL
Qty: 30 TABLET | Refills: 0 | Status: SHIPPED | OUTPATIENT
Start: 2025-04-01

## 2025-04-01 RX ORDER — TRAZODONE HYDROCHLORIDE 50 MG/1
50-100 TABLET ORAL
Qty: 60 TABLET | Refills: 0 | OUTPATIENT
Start: 2025-04-01

## 2025-04-01 NOTE — TELEPHONE ENCOUNTER
Reason for call:   [x] Refill   [] Prior Auth  [] Other:     Office:   [x] PCP/Provider -   [] Specialty/Provider -     traZODone (DESYREL) 50 mg tablet  mg, Oral, Daily at bedtime          Edit  Cancel Reorder    zolpidem (AMBIEN) 10 mg tablet 10 mg, Oral, Daily at bedtime PRN             Quantity: 90    Pharmacy: [facility]   Does the patient have enough for 3 days?   [] Yes   [x] No - Send as HP to POD    Mail Away Pharmacy   Does the patient have enough for 10 days?   [] Yes   [] No - Send as HP to POD

## 2025-05-05 DIAGNOSIS — G47.09 OTHER INSOMNIA: ICD-10-CM

## 2025-05-05 RX ORDER — ZOLPIDEM TARTRATE 10 MG/1
10 TABLET ORAL
Qty: 30 TABLET | Refills: 0 | Status: SHIPPED | OUTPATIENT
Start: 2025-05-05

## 2025-05-05 NOTE — TELEPHONE ENCOUNTER
Reason for call:   [x] Refill   [] Prior Auth  [] Other:     Office:   [x] PCP/Provider - Eva Smart PA-C / [facility]   [] Specialty/Provider -     Medication: zolpidem (AMBIEN) 10 mg tablet     Dose/Frequency: Take 1-2 tablets ( mg total) by mouth daily at bedtime     Quantity: 60    Pharmacy: [address]     Local Pharmacy   Does the patient have enough for 3 days?   [] Yes   [x] No - Send as HP to POD    Mail Away Pharmacy   Does the patient have enough for 10 days?   [] Yes   [] No - Send as HP to POD

## 2025-05-13 ENCOUNTER — PROCEDURE VISIT (OUTPATIENT)
Dept: PODIATRY | Facility: CLINIC | Age: 75
End: 2025-05-13
Payer: MEDICARE

## 2025-05-13 VITALS — WEIGHT: 273 LBS | HEIGHT: 71 IN | BODY MASS INDEX: 38.22 KG/M2

## 2025-05-13 DIAGNOSIS — M79.675 PAIN IN TOES OF BOTH FEET: ICD-10-CM

## 2025-05-13 DIAGNOSIS — Z79.01 LONG TERM CURRENT USE OF ANTICOAGULANT: ICD-10-CM

## 2025-05-13 DIAGNOSIS — B35.1 ONYCHOMYCOSIS: Primary | ICD-10-CM

## 2025-05-13 DIAGNOSIS — R73.03 PRE-DIABETES: ICD-10-CM

## 2025-05-13 DIAGNOSIS — L85.3 XEROSIS OF SKIN: ICD-10-CM

## 2025-05-13 DIAGNOSIS — M79.674 PAIN IN TOES OF BOTH FEET: ICD-10-CM

## 2025-05-13 DIAGNOSIS — L84 CALLUS: ICD-10-CM

## 2025-05-13 DIAGNOSIS — I73.9 PERIPHERAL ARTERIAL DISEASE (HCC): ICD-10-CM

## 2025-05-13 DIAGNOSIS — M79.672 PAIN OF LEFT HEEL: ICD-10-CM

## 2025-05-13 PROCEDURE — 11055 PARING/CUTG B9 HYPRKER LES 1: CPT | Performed by: PODIATRIST

## 2025-05-13 PROCEDURE — 11721 DEBRIDE NAIL 6 OR MORE: CPT | Performed by: PODIATRIST

## 2025-05-13 NOTE — ASSESSMENT & PLAN NOTE
Debride Tyloma to dermal layer x 1 with the use of a 312 blade and sharp dissection.   Pt was instructed to use lotion once a day on both feet such as cerave, Cetaphil or similar thick style of lotion.   Patient is to use a pumice stone on the callused areas daily after he showers.

## 2025-05-13 NOTE — PROGRESS NOTES
Name: Umang Morris      : 1950      MRN: [id number]  Encounter Provider: Garry Terrell DPM  Encounter Date: 2025   Encounter department: [facility] [place]  :  Assessment & Plan  Onychomycosis       Debride mycotic nails and thin the nail plates x 6 with the use of a nail nipper manually and an electric Dremel bur was used to reduce the thickness of the nail beds and smoothed the distal aspect of the nails.   Callus       Debride Tyloma to dermal layer x 1 with the use of a 312 blade and sharp dissection.   Pt was instructed to use lotion once a day on both feet such as cerave, Cetaphil or similar thick style of lotion.   Patient is to use a pumice stone on the callused areas daily after he showers.  Xerosis of skin       Recommended to the patient that he increase his water intake and lower his Crystal light intake.  Pre-diabetes         Long term current use of anticoagulant         Peripheral arterial disease (HCC)         Pain in toes of both feet         Pain of left heel         Discussed proper shoe gear, daily inspections of feet, and general foot health with patient. Patient has Q8  findings and is recommended for at risk foot care every 9-10 weeks.    Patients most recent complete clinical foot exam was on: 2025    Return in about 10 weeks (around 2025).      History of Present Illness   HPI  Umang Morris is a 74 y.o. male who presents with a chief complaint of pain.  Nails and a very painful callus on the inside part of his left heel.  Patient states that he has been using lotion but not daily.  He is a prediabetic whose last A1c was 6.1 performed on 10/24/2024.  History obtained from: patient    Review of Systems  Medical History Reviewed by provider this encounter:     .  Current Outpatient Medications on File Prior to Visit   Medication Sig Dispense Refill    albuterol (Ventolin HFA) 90 mcg/act inhaler Inhale 2 puffs every 4 (four) hours as needed  "for wheezing or shortness of breath 18 g 2    ALPRAZolam (XANAX) 0.5 mg tablet Take 1 tablet (0.5 mg total) by mouth 3 (three) times a day as needed for anxiety 90 tablet 0    aspirin 81 MG tablet Take 81 mg by mouth daily      fluticasone (FLONASE) 50 mcg/act nasal spray 1 spray into each nostril daily 16 g 1    fluticasone-salmeterol (AirDuo RespiClick 232/14) 232-14 mcg/act dry powder inhaler Inhale 1 puff 2 (two) times a day Rinse mouth after use. 1 each 3    furosemide (LASIX) 40 mg tablet Take 1 tablet (40 mg total) by mouth daily as needed (swelling) 90 tablet 1    levocetirizine (XYZAL) 5 MG tablet Take 1 tablet (5 mg total) by mouth every evening 30 tablet 2    metoprolol succinate (TOPROL-XL) 25 mg 24 hr tablet Take 1 tablet (25 mg total) by mouth daily 90 tablet 1    traZODone (DESYREL) 50 mg tablet Take 1-2 tablets ( mg total) by mouth daily at bedtime 60 tablet 5    zolpidem (AMBIEN) 10 mg tablet Take 1 tablet (10 mg total) by mouth daily at bedtime as needed for sleep 30 tablet 0    ergocalciferol (VITAMIN D2) 50,000 units Take 1 capsule (50,000 Units total) by mouth once a week (Patient not taking: Reported on 11/11/2024) 4 capsule 3    losartan (COZAAR) 100 MG tablet Take 1 tablet (100 mg total) by mouth daily 90 tablet 1    pravastatin (PRAVACHOL) 40 mg tablet Take 1 tablet (40 mg total) by mouth daily 90 tablet 1     No current facility-administered medications on file prior to visit.      Social History     Tobacco Use    Smoking status: Former     Types: Cigarettes     Passive exposure: Never    Smokeless tobacco: Never   Vaping Use    Vaping status: Never Used   Substance and Sexual Activity    Alcohol use: Yes     Comment: SOCIAL    Drug use: No    Sexual activity: Not on file        Objective   Ht 5' 11\" (1.803 m)   Wt 124 kg (273 lb)   BMI 38.08 kg/m²      Physical Exam  Vascular status is 0/4 DP PT negative digital hair, delayed capillary refill, normal distal cooling bilaterally.  " There is edema present bilaterally also.  Capillary refill is approximately 3 to 4 seconds and edema is +2 pitting.     Derm nails are brittle elongated hypertrophic yellow-white discoloration with subungual debris x 6.  There is an increased thickness in the nails of approximately 2 mm.  There is white flaky tissue present on the dorsal and plantar aspects of both feet.  There is white flaky tissue present on the anterior aspect of both legs there is no signs of any fissures within this tissue or dried blood.  There is a buildup of hypertrophic tissue present on the plantar medial aspect of the plantar medial aspect of the heel on the left foot.  There are numerous small fissures present within this hypertrophic lesion but no signs of any dried blood.    There is loss of turgor noted bilaterally and dependent rubor is also present bilaterally.  The anterior aspect of both feet and are shiny.  .

## 2025-05-15 DIAGNOSIS — I10 PRIMARY HYPERTENSION: ICD-10-CM

## 2025-05-15 RX ORDER — FUROSEMIDE 40 MG/1
40 TABLET ORAL DAILY PRN
Qty: 30 TABLET | Refills: 0 | Status: SHIPPED | OUTPATIENT
Start: 2025-05-15

## 2025-05-15 NOTE — TELEPHONE ENCOUNTER
Reason for call:   [x] Refill   [] Prior Auth  [] Other:     Office:   [x] PCP/Provider - [facility]   [] Specialty/Provider -     Medication:   ~ furosemide (LASIX) 40 mg tablet - Take 1 tablet (40 mg total) by mouth daily as needed (swelling)    Pharmacy:   [facility] [address] [place], [place] - [address]     Local Pharmacy   Does the patient have enough for 3 days?   [] Yes   [x] No - Send as HP to POD

## 2025-06-04 DIAGNOSIS — R06.2 WHEEZING: ICD-10-CM

## 2025-06-04 DIAGNOSIS — J40 BRONCHITIS: ICD-10-CM

## 2025-06-04 RX ORDER — ALBUTEROL SULFATE 90 UG/1
2 INHALANT RESPIRATORY (INHALATION) EVERY 4 HOURS PRN
Qty: 18 G | Refills: 1 | Status: SHIPPED | OUTPATIENT
Start: 2025-06-04

## 2025-06-04 NOTE — TELEPHONE ENCOUNTER
Reason for call:   [x] Refill   [] Prior Auth  [] Other:     Office:   [x] PCP/Provider -   [] Specialty/Provider -     Medication:   - Albuterol (Ventolin HFA) 90 mcg inhaler- Inhale 2 puffs every 4 hours as needed      Pharmacy: [facility] [place]    Local Pharmacy   Does the patient have enough for 3 days?   [] Yes   [x] No - Send as HP to POD    Mail Away Pharmacy   Does the patient have enough for 10 days?   [] Yes   [] No - Send as HP to POD

## 2025-06-06 ENCOUNTER — TELEPHONE (OUTPATIENT)
Dept: INTERNAL MEDICINE CLINIC | Facility: CLINIC | Age: 75
End: 2025-06-06

## 2025-06-06 DIAGNOSIS — G47.09 OTHER INSOMNIA: ICD-10-CM

## 2025-06-06 RX ORDER — ZOLPIDEM TARTRATE 10 MG/1
10 TABLET ORAL
Qty: 30 TABLET | Refills: 1 | Status: SHIPPED | OUTPATIENT
Start: 2025-06-06

## 2025-06-06 NOTE — TELEPHONE ENCOUNTER
Patient called the RX Refill Line. Message is being forwarded to the office.     Patient called and wanted to see if he still needs to be taking Xyzal 5 mg. States that if he does, there are no refills and needs that sent to [facility].     Please contact patient at [phone] to let him know

## 2025-06-06 NOTE — TELEPHONE ENCOUNTER
Reason for call:   [x] Refill   [] Prior Auth  [] Other:     Office:   [x] [facility]   [] Specialty/Provider -     Medication: Ambien    Dose/Frequency: 10 mg    Quantity: #30    Pharmacy: [facility]    Local Pharmacy   Does the patient have enough for 3 days?   [] Yes   [x] No - Send as HP to POD    Mail Away Pharmacy   Does the patient have enough for 10 days?   [] Yes   [] No - Send as HP to POD

## 2025-06-06 NOTE — TELEPHONE ENCOUNTER
If he is experiencing ongoing allergy symptoms he should continue, if not he can stop it. This is OTC, no need to send rx

## 2025-06-19 DIAGNOSIS — I10 PRIMARY HYPERTENSION: ICD-10-CM

## 2025-06-19 RX ORDER — FUROSEMIDE 40 MG/1
40 TABLET ORAL DAILY PRN
Qty: 30 TABLET | Refills: 1 | Status: SHIPPED | OUTPATIENT
Start: 2025-06-19

## 2025-06-19 NOTE — TELEPHONE ENCOUNTER
Patient states is aware of labs and will have done closer to appointment.     Reason for call:   [x] Refill   [] Prior Auth  [] Other:     Office:   [x] PCP/Provider -   [] Specialty/Provider -     Medication: furosemide (LASIX) 40 mg     Dose/Frequency: Take 1 tablet (40 mg total) by mouth daily as needed     Quantity: 30    Pharmacy: [facility] [address] [place] [address] [address]     Local Pharmacy   Does the patient have enough for 3 days?   [] Yes   [x] No - Send as HP to POD    Mail Away Pharmacy   Does the patient have enough for 10 days?   [] Yes   [] No - Send as HP to POD

## 2025-07-14 DIAGNOSIS — G47.09 OTHER INSOMNIA: ICD-10-CM

## 2025-07-14 DIAGNOSIS — I10 PRIMARY HYPERTENSION: ICD-10-CM

## 2025-07-14 DIAGNOSIS — E78.5 HYPERLIPIDEMIA, UNSPECIFIED HYPERLIPIDEMIA TYPE: ICD-10-CM

## 2025-07-14 RX ORDER — TRAZODONE HYDROCHLORIDE 50 MG/1
50-100 TABLET ORAL
Qty: 60 TABLET | Refills: 0 | Status: SHIPPED | OUTPATIENT
Start: 2025-07-14

## 2025-07-14 RX ORDER — LOSARTAN POTASSIUM 100 MG/1
100 TABLET ORAL DAILY
Qty: 30 TABLET | Refills: 0 | Status: SHIPPED | OUTPATIENT
Start: 2025-07-14 | End: 2025-10-12

## 2025-07-14 RX ORDER — PRAVASTATIN SODIUM 40 MG
40 TABLET ORAL DAILY
Qty: 30 TABLET | Refills: 0 | Status: SHIPPED | OUTPATIENT
Start: 2025-07-14 | End: 2025-10-12

## 2025-07-14 NOTE — TELEPHONE ENCOUNTER
Reason for call:   [x] Refill   [] Prior Auth  [] Other:     Office:   [x] PCP/Provider - Eva Smart   [] Specialty/Provider -     Medication: losartan 100 mg take daily     Pravastatin 40 mg take daily    Trazodone 50 mg take 1-2 tablet daily at bedtime         Pharmacy: [facility]   Does the patient have enough for 3 days?   [] Yes   [x] No - Send as HP to POD- stated he's out of all of them    Mail Away Pharmacy   Does the patient have enough for 10 days?   [] Yes   [] No - Send as HP to POD

## 2025-07-29 ENCOUNTER — PROCEDURE VISIT (OUTPATIENT)
Dept: PODIATRY | Facility: CLINIC | Age: 75
End: 2025-07-29
Payer: MEDICARE

## 2025-07-29 VITALS — BODY MASS INDEX: 38.22 KG/M2 | HEIGHT: 71 IN | WEIGHT: 273 LBS

## 2025-07-29 DIAGNOSIS — R73.03 PRE-DIABETES: ICD-10-CM

## 2025-07-29 DIAGNOSIS — L84 CALLUS: ICD-10-CM

## 2025-07-29 DIAGNOSIS — M79.675 PAIN IN TOES OF BOTH FEET: ICD-10-CM

## 2025-07-29 DIAGNOSIS — B35.1 ONYCHOMYCOSIS: Primary | ICD-10-CM

## 2025-07-29 DIAGNOSIS — M79.674 PAIN IN TOES OF BOTH FEET: ICD-10-CM

## 2025-07-29 DIAGNOSIS — Z79.01 LONG TERM CURRENT USE OF ANTICOAGULANT: ICD-10-CM

## 2025-07-29 PROCEDURE — 11055 PARING/CUTG B9 HYPRKER LES 1: CPT | Performed by: PODIATRIST

## 2025-07-29 PROCEDURE — 11721 DEBRIDE NAIL 6 OR MORE: CPT | Performed by: PODIATRIST

## 2025-08-02 ENCOUNTER — NURSE TRIAGE (OUTPATIENT)
Dept: OTHER | Facility: OTHER | Age: 75
End: 2025-08-02

## 2025-08-02 DIAGNOSIS — I10 PRIMARY HYPERTENSION: ICD-10-CM

## 2025-08-02 RX ORDER — METOPROLOL SUCCINATE 25 MG/1
25 TABLET, EXTENDED RELEASE ORAL DAILY
Qty: 90 TABLET | Refills: 2 | Status: SHIPPED | OUTPATIENT
Start: 2025-08-02

## 2025-08-05 DIAGNOSIS — G47.09 OTHER INSOMNIA: ICD-10-CM

## 2025-08-05 RX ORDER — TRAZODONE HYDROCHLORIDE 50 MG/1
50-100 TABLET ORAL
Qty: 60 TABLET | Refills: 5 | Status: SHIPPED | OUTPATIENT
Start: 2025-08-05

## 2025-08-05 RX ORDER — ZOLPIDEM TARTRATE 10 MG/1
10 TABLET ORAL
Qty: 30 TABLET | Refills: 1 | Status: SHIPPED | OUTPATIENT
Start: 2025-08-05

## 2025-08-18 DIAGNOSIS — E78.5 HYPERLIPIDEMIA, UNSPECIFIED HYPERLIPIDEMIA TYPE: ICD-10-CM

## 2025-08-18 DIAGNOSIS — I10 PRIMARY HYPERTENSION: ICD-10-CM

## 2025-08-18 RX ORDER — LOSARTAN POTASSIUM 100 MG/1
100 TABLET ORAL DAILY
Qty: 30 TABLET | Refills: 0 | Status: SHIPPED | OUTPATIENT
Start: 2025-08-18 | End: 2025-08-26 | Stop reason: SDUPTHER

## 2025-08-18 RX ORDER — PRAVASTATIN SODIUM 40 MG
40 TABLET ORAL DAILY
Qty: 30 TABLET | Refills: 0 | Status: SHIPPED | OUTPATIENT
Start: 2025-08-18 | End: 2025-08-26 | Stop reason: SDUPTHER

## 2025-08-21 ENCOUNTER — RESULTS FOLLOW-UP (OUTPATIENT)
Dept: INTERNAL MEDICINE CLINIC | Facility: CLINIC | Age: 75
End: 2025-08-21